# Patient Record
Sex: FEMALE | Race: BLACK OR AFRICAN AMERICAN | NOT HISPANIC OR LATINO | Employment: OTHER | URBAN - METROPOLITAN AREA
[De-identification: names, ages, dates, MRNs, and addresses within clinical notes are randomized per-mention and may not be internally consistent; named-entity substitution may affect disease eponyms.]

---

## 2017-01-04 ENCOUNTER — OFFICE VISIT (OUTPATIENT)
Dept: NEUROLOGY | Facility: CLINIC | Age: 77
End: 2017-01-04
Payer: MEDICARE

## 2017-01-04 VITALS
WEIGHT: 187 LBS | SYSTOLIC BLOOD PRESSURE: 150 MMHG | DIASTOLIC BLOOD PRESSURE: 69 MMHG | BODY MASS INDEX: 31.16 KG/M2 | RESPIRATION RATE: 20 BRPM | HEART RATE: 65 BPM | HEIGHT: 65 IN

## 2017-01-04 DIAGNOSIS — R41.3 MEMORY LOSS: Primary | ICD-10-CM

## 2017-01-04 PROCEDURE — 3078F DIAST BP <80 MM HG: CPT | Mod: S$GLB,,,

## 2017-01-04 PROCEDURE — 99499 UNLISTED E&M SERVICE: CPT | Mod: S$GLB,,, | Performed by: NURSE PRACTITIONER

## 2017-01-04 PROCEDURE — 1125F AMNT PAIN NOTED PAIN PRSNT: CPT | Mod: S$GLB,,,

## 2017-01-04 PROCEDURE — 3077F SYST BP >= 140 MM HG: CPT | Mod: S$GLB,,,

## 2017-01-04 PROCEDURE — 1159F MED LIST DOCD IN RCRD: CPT | Mod: S$GLB,,,

## 2017-01-04 PROCEDURE — 1160F RVW MEDS BY RX/DR IN RCRD: CPT | Mod: S$GLB,,,

## 2017-01-04 PROCEDURE — 99215 OFFICE O/P EST HI 40 MIN: CPT | Mod: S$GLB,,,

## 2017-01-04 PROCEDURE — 99999 PR PBB SHADOW E&M-EST. PATIENT-LVL IV: CPT | Mod: PBBFAC,,,

## 2017-01-04 PROCEDURE — 1157F ADVNC CARE PLAN IN RCRD: CPT | Mod: S$GLB,,,

## 2017-01-04 NOTE — Clinical Note
Any idea when this lady's NP testing will be done? Just want to have an idea for scheduling follow up with me.  Thanks!

## 2017-01-04 NOTE — PROGRESS NOTES
"Name: Jerica Rios  MRN: [id number]   CSN: [id number]      Date: 01/04/2017    Referring physician:  No referring provider defined for this encounter.    Chief Complaint / Interval History: Memory Loss      History of Present Illness (HPI):    She feels things are "fair. Her son, who accompanies her today still notices some issues with short term memory. He describes an event after [place] where she could not find her pill box. He found it in the pressure cooker (was not on-had been cleaned and was ready to be put away).  They talked about having a family gathering with the family coming over for the holdiays and she would ask why they were coming over. He would remind her. He is havng to repeat info several times. They have watched Lisa Santos cooking shows for years. The other day, she heard her name and asked who she is. She is independent with ADLs.       Date: 11/03/2016     Referring physician:  EVE Fermin  [address] [place] [address]     Chief Complaint / Interval History: Consult        History of Present Illness (HPI):     75 yo RH female who presents for evaluation of memory and is accompanied by her son, Rosalio. He says he initiated this visit. He has lived with her the past 7 years. He moved in when her  / his father became ill. She offers that she is aware of memory issues. He offers that she has always had a great deal of anxeity and takes clonazepam on occasion. He says sometimes, her emoitons get so unraveled. Over the past 5-6 years, he has noted some short term memory issues. She will forget things. He says it used to be she would forget something that happened two weeks ago but could recall with prompting..      She is able to feed, bathe, and dress self. In the past couple of weeks, she has had an aide while her son is at work.      Within past few weeks, he feels there has been an accelerator to her memory issues. Now she cannot recall a morning event that same " "evening. A few weeks ago, she described an earache and felt bad enough to go to ED. An infection was discovered and she was given IV antibiotics. At that point, she was also discovered to have hyponatremia and was admitted overnight for observation. The next day, she was sent home with home health. She followed up with ENT and infection was gone. Hearing test indicated slt hearing loss in B ears.     He offers that she is has always been paranoid to talk on the phone, even to family members. She always felt by doing this, some harm may come her way. Recently, medical Professionals have been calling to schedule times to come to home for therapies or assistance. She will agree. When they arrive, she felix s not know why they are there and is scared to open the door.      She is forgetting names of famiily members. She does get lost inside her home. She has not driven in years. She cooks sometimes and says she has no trouble. When asked if she ever burns food, son says a couple of days ago, she got sidetracked and burned vegetables in oven. She realized this when she smelled burning. Son has managed finances since his father .      He says home nurse has done labs a couple of times over the past couple of weeks. Everything has been fine except "stable anemia."    Diagnoses:          Memory Loss- MOCA 13+1=  CT head from - - ? Enlarged ventricles  Describes some bladder leakage. Had falls earlier this year, not recent. Gait does not look consistent with NPH.     Medical Decision Making:     Complete reversible memory labs.  Repeat CT head.   Refer to NP evaluation. This may be cancelled if CT head looks c/w NPH.   Follow up 2-3 weeks with me or Archana Calhoun NP after CT head- prefer [employer] if opening available.   I have asked her to not cook until we have more answers. She and son agreeable.        ROS:  Urinary issues ()?Yes- some bladder leakage if waits too long- noted past 3-4 mos- not " worsening  Orthostasis (Cardiovascular)?No  Falls (Musculoskeletal)?No  Cognitive impairment (Neurologic)?Yes  Psychoses (Psychiatric)?No      Past Medical History: The patient  has a past medical history of Allergy; Arthritis; Carotid artery disease; Coronary artery disease; Diabetes mellitus; Diabetes mellitus, type 2; Fall at home; GERD (gastroesophageal reflux disease); Headache(784.0); Hyperlipidemia; Hypertension; Mental disorder; Obesity; Right rib fracture; Snoring; and Toe fracture, right.    Social History: The patient  reports that she has never smoked. She has never used smokeless tobacco. She reports that she does not drink alcohol or use illicit drugs.    Family History: Their family history includes Cancer in her mother; Diabetes in her mother; Heart disease in her brother, father, mother, and sister; Stroke in her brother. There is no history of Dementia.    Allergies: Amoxicillin-pot clavulanate; Meperidine; Propoxyphene n-acetaminophen; and Sulfa (sulfonamide antibiotics)     Meds:   Current Outpatient Prescriptions on File Prior to Visit   Medication Sig Dispense Refill    baclofen (LIORESAL) 10 MG tablet START WITH 1/2 TABLET EVERY EVENING X 1 WEEK THEN 1 INCREASE TO 1 TABLET EVERY EVENING 90 tablet 0    blood sugar diagnostic (TRUETEST TEST STRIPS) Strp 1 strip by Misc.(Non-Drug; Combo Route) route once daily. 100 strip 3    clonazePAM (KLONOPIN) 0.5 MG tablet TAKE 1 TABLET BY MOUTH 1 TO 2 TIMES A DAY AS NEEDED 30 tablet 0    clopidogrel (PLAVIX) 75 mg tablet TAKE 1 TABLET BY MOUTH EVERY DAY -MUST FOLLOW UP WITH CARDIOLOGIST 30 tablet 11    esomeprazole (NEXIUM) 20 MG capsule Take 20 mg by mouth once daily. Patient takes OTC nexium      guaifenesin (MUCINEX) 600 mg 12 hr tablet Take 1 tablet (600 mg total) by mouth 2 (two) times daily. As needed for congestion. May start with 1/2 tablet also. Drink plenty of liquids with this medication      hydrocodone-acetaminophen 5-325mg (NORCO) 5-325  mg per tablet Take 1 tablet by mouth every 8 (eight) hours as needed for Pain.      ipratropium (ATROVENT) 0.03 % nasal spray       irbesartan (AVAPRO) 300 MG tablet TAKE 1 TABLET BY MOUTH EVERY EVENING. 90 tablet 2    KLOR-CON SPRINKLE 10 mEq CpSR TAKE ONE CAPSULE BY MOUTH EVERY DAY 30 capsule 4    lancets & blood glucose strips Cmpk 1 each by Misc.(Non-Drug; Combo Route) route once daily. 30 each 0    lancets 33 gauge Misc 1 lancet by Misc.(Non-Drug; Combo Route) route once daily. 100 each 3    levothyroxine (SYNTHROID) 50 MCG tablet TAKE 1 TABLET BY MOUTH EVERY MORNING ON AN EMPTY STOMACH 90 tablet 3    magnesium oxide (MAG-OXIDE) 400 mg tablet Take 1 tablet (400 mg total) by mouth once daily. Every day 90 tablet 3    metformin (GLUCOPHAGE) 500 MG tablet TAKE 1 TABLET (500 MG TOTAL) BY MOUTH 2 (TWO) TIMES DAILY WITH MEALS. 180 tablet 3    metoprolol tartrate (LOPRESSOR) 25 MG tablet Take 1 tablet (25 mg total) by mouth 2 (two) times daily. 180 tablet 3    mometasone (NASONEX) 50 mcg/actuation nasal spray 2 sprays by Nasal route once daily. 17 g 3    MULTIVITAMIN ORAL Take 1 Package by mouth once daily. Pt takes Diabetic Vitamin Pack      nitroGLYCERIN (NITROSTAT) 0.4 MG SL tablet Place 1 tablet (0.4 mg total) under the tongue every 5 (five) minutes as needed for Chest pain. As directed  tablet 3    potassium chloride (MICRO-K) 10 MEQ CpSR TAKE ONE CAPSULE BY MOUTH EVERY DAY 30 capsule 4    sertraline (ZOLOFT) 50 MG tablet TAKE 1 TABLET BY MOUTH EVERY DAY 30 tablet 0    vitamin D 1000 units Tab Take 185 mg by mouth once daily.      amlodipine (NORVASC) 10 MG tablet Take 1 tablet (10 mg total) by mouth once daily. 90 tablet 3    atorvastatin (LIPITOR) 40 MG tablet Take 1 tablet (40 mg total) by mouth once daily. 90 tablet 3    [DISCONTINUED] baclofen (LIORESAL) 10 MG tablet START WITH 1/2 TABLET EVERY EVENING X 1 WEEK THEN INCREASE TO 1 TABLET EVERY EVENING 30 tablet 0    [DISCONTINUED]  "ciprofloxacin-dexamethasone 0.3-0.1% (CIPRODEX) 0.3-0.1 % DrpS 4 drops 2 (two) times daily. Left ear      [DISCONTINUED] ranitidine (ZANTAC) 150 MG tablet Take 1 tablet (150 mg total) by mouth 2 (two) times daily. 60 tablet 11     No current facility-administered medications on file prior to visit.        Exam:  Visit Vitals    BP (!) 150/69    Pulse 65    Resp 20    Ht 5' 5" (1.651 m)    Wt 84.8 kg (187 lb)  Comment: reports    BMI 31.12 kg/m2       Constitutional  Well-developed, well-nourished, appears stated age   Cardiovascular  Radial pulses 2+ and symmetric, no LE edema bilaterally   Neurological    * Mental status  MOCA = deferred today     - Orientation  Oriented to person, place, time (BDay was yesterday) and situation     - Memory   Not tested today     - Attention/concentration  Attentive, vigilant during exam     - Language  Fluent- appropriate in conversation   * Cranial nerves       - CN II  PERRL, visual fields full to confrontation     - CN III, IV, VI  Extraocular movements full, normal pursuits and saccades     - CN V  Sensation V1 - V3 intact     - CN VII  Face strong and symmetric bilaterally     - CN VIII  Hearing intact bilaterally     - CN IX, X  Palate raises midline and symmetric     - CN XI  SCM and trapezius 5/5 bilaterally     - CN XII  Tongue midline   * Gait  Pushes self to stand. Ambulates with assist of rollator.      Laboratory/Radiological:  - Results:  Lab Visit on 11/03/2016   Component Date Value Ref Range Status    Sodium 11/03/2016 135* 136 - 145 mmol/L Final    Potassium 11/03/2016 4.4  3.5 - 5.1 mmol/L Final    Chloride 11/03/2016 99  95 - 110 mmol/L Final    CO2 11/03/2016 24  23 - 29 mmol/L Final    Glucose 11/03/2016 92  70 - 110 mg/dL Final    BUN, Bld 11/03/2016 10  8 - 23 mg/dL Final    Creatinine 11/03/2016 1.0  0.5 - 1.4 mg/dL Final    Calcium 11/03/2016 10.1  8.7 - 10.5 mg/dL Final    Total Protein 11/03/2016 7.4  6.0 - 8.4 g/dL Final    Albumin " 11/03/2016 4.4  3.5 - 5.2 g/dL Final    Total Bilirubin 11/03/2016 0.6  0.1 - 1.0 mg/dL Final    Alkaline Phosphatase 11/03/2016 120  55 - 135 U/L Final    AST 11/03/2016 15  10 - 40 U/L Final    ALT 11/03/2016 15  10 - 44 U/L Final    Anion Gap 11/03/2016 12  8 - 16 mmol/L Final    eGFR if African American 11/03/2016 >60.0  >60 mL/min/1.73 m^2 Final    eGFR if non African American 11/03/2016 54.9* >60 mL/min/1.73 m^2 Final    RPR 11/03/2016 Non-reactive  Non-reactive Final       - Independent review of images: CT head               Impression    Mild brain atrophy with deep white matter ischemic changes otherwise negative head CT.      Electronically signed by: Sandeep Freeman MD  Date: 11/09/16           Diagnoses:            Memory Loss- suspect dementia    Medical Decision Making:    Reviewed CT head and images with them. Mild enlargement of ventricles. Doubtful NPH. Does have some bladder leakage if waits too long. No recent falls. Gait not c/w NPH. Discussed this differential with them, as well as the testing (high volume LP) and treatment of shunting. At this point, they are not interested in pursuing testing as no interested in treatment if it ended up positive.   Will proceed with NP testing.   Follow up 2-3 weeks after for results (will try to get her scheduled for follow up on [place]. They understand the NP eval is on [facility].   Son has many questions about dementia and AD as well as medication options, goals of treatment, and supplements (i.e. Gingko).       I spent 45 minutes face-to-face with the patient and son with >90% of the time spent with counseling and education regarding:  - results of data, diagnosis, and recommendations stated above  - the prognosis of memory loss, dementia  - rationale of plan  - importance of diet and exercise    Lesly Humphreys, JONO, NP-C  Division of Movement and Memory Disorders  [facility]  [phone]

## 2017-01-16 ENCOUNTER — TELEPHONE (OUTPATIENT)
Dept: NEUROLOGY | Facility: CLINIC | Age: 77
End: 2017-01-16

## 2017-01-16 NOTE — TELEPHONE ENCOUNTER
----- Message from Chrissy Felix MA sent at 1/12/2017  8:40 AM CST -----  Contact: Matthew denney son [phone]   2wk follow up after NP eval with Dr. Torres on the [facility] with magdaleno if possible  ----- Message -----     From: Mariama Kirkpatrick MA     Sent: 1/5/2017   1:23 PM       To: Chrissy Felix MA    Spoke to patient's son Matthew, scheduled on 4/12th testing. They need a Wednesday and on the [facility] if possible for the 2 weeks follow up appt with Dr. Humphreys.

## 2017-01-16 NOTE — TELEPHONE ENCOUNTER
Returned call to pt's son and informed scheduled for May is not open, but will booked pt for appointment in May and he would like it around 10am. Will send message in "[employer] [employer] per request.

## 2017-01-16 NOTE — TELEPHONE ENCOUNTER
----- Message from Areli Masters sent at 1/16/2017 10:08 AM CST -----  Contact: Rosalio (son) [phone]  Rosalio is calling to speak with Chrissy to schedule appt to see jc malik call

## 2017-01-20 ENCOUNTER — TELEPHONE (OUTPATIENT)
Dept: NEUROLOGY | Facility: CLINIC | Age: 77
End: 2017-01-20

## 2017-01-20 NOTE — TELEPHONE ENCOUNTER
----- Message from Mariama Kirkpatrick MA sent at 1/5/2017  1:23 PM CST -----  Contact: Matthew pts son [phone]  Spoke to patient's son Matthew, scheduled on 4/12th testing. They need a Wednesday and on the [facility] if possible for the 2 weeks follow up appt with Dr. Humphreys.

## 2017-01-20 NOTE — TELEPHONE ENCOUNTER
Called patient and informed him about [facility] is still closed and that I will stay in contact with him regarding his appointment

## 2017-02-17 RX ORDER — BACLOFEN 10 MG/1
TABLET ORAL
Qty: 90 TABLET | Refills: 0 | Status: SHIPPED | OUTPATIENT
Start: 2017-02-17 | End: 2017-05-20 | Stop reason: SDUPTHER

## 2017-02-28 RX ORDER — CLOPIDOGREL BISULFATE 75 MG/1
TABLET ORAL
Qty: 30 TABLET | Refills: 11 | Status: SHIPPED | OUTPATIENT
Start: 2017-02-28 | End: 2018-01-27 | Stop reason: SDUPTHER

## 2017-03-08 ENCOUNTER — OFFICE VISIT (OUTPATIENT)
Dept: PAIN MEDICINE | Facility: CLINIC | Age: 77
End: 2017-03-08
Payer: MEDICARE

## 2017-03-08 VITALS
SYSTOLIC BLOOD PRESSURE: 120 MMHG | HEIGHT: 65 IN | DIASTOLIC BLOOD PRESSURE: 75 MMHG | RESPIRATION RATE: 18 BRPM | HEART RATE: 65 BPM | BODY MASS INDEX: 31.7 KG/M2 | WEIGHT: 190.25 LBS

## 2017-03-08 DIAGNOSIS — M51.36 DDD (DEGENERATIVE DISC DISEASE), LUMBAR: Primary | ICD-10-CM

## 2017-03-08 DIAGNOSIS — G89.29 CHRONIC RIGHT SHOULDER PAIN: ICD-10-CM

## 2017-03-08 DIAGNOSIS — M25.511 CHRONIC RIGHT SHOULDER PAIN: ICD-10-CM

## 2017-03-08 DIAGNOSIS — M47.817 LUMBOSACRAL SPONDYLOSIS WITHOUT MYELOPATHY: ICD-10-CM

## 2017-03-08 PROCEDURE — 99999 PR PBB SHADOW E&M-EST. PATIENT-LVL III: CPT | Mod: PBBFAC,,, | Performed by: ANESTHESIOLOGY

## 2017-03-08 PROCEDURE — 3074F SYST BP LT 130 MM HG: CPT | Mod: S$GLB,,, | Performed by: ANESTHESIOLOGY

## 2017-03-08 PROCEDURE — 1159F MED LIST DOCD IN RCRD: CPT | Mod: S$GLB,,, | Performed by: ANESTHESIOLOGY

## 2017-03-08 PROCEDURE — 20610 DRAIN/INJ JOINT/BURSA W/O US: CPT | Mod: S$GLB,,, | Performed by: ANESTHESIOLOGY

## 2017-03-08 PROCEDURE — 1125F AMNT PAIN NOTED PAIN PRSNT: CPT | Mod: S$GLB,,, | Performed by: ANESTHESIOLOGY

## 2017-03-08 PROCEDURE — 99214 OFFICE O/P EST MOD 30 MIN: CPT | Mod: 25,S$GLB,, | Performed by: ANESTHESIOLOGY

## 2017-03-08 PROCEDURE — 1157F ADVNC CARE PLAN IN RCRD: CPT | Mod: S$GLB,,, | Performed by: ANESTHESIOLOGY

## 2017-03-08 PROCEDURE — 3078F DIAST BP <80 MM HG: CPT | Mod: S$GLB,,, | Performed by: ANESTHESIOLOGY

## 2017-03-08 PROCEDURE — 1160F RVW MEDS BY RX/DR IN RCRD: CPT | Mod: S$GLB,,, | Performed by: ANESTHESIOLOGY

## 2017-03-08 RX ORDER — METHYLPREDNISOLONE ACETATE 40 MG/ML
40 INJECTION, SUSPENSION INTRA-ARTICULAR; INTRALESIONAL; INTRAMUSCULAR; SOFT TISSUE
Status: DISCONTINUED | OUTPATIENT
Start: 2017-03-08 | End: 2017-03-08 | Stop reason: HOSPADM

## 2017-03-08 RX ORDER — HYDROCODONE BITARTRATE AND ACETAMINOPHEN 5; 325 MG/1; MG/1
1 TABLET ORAL EVERY 8 HOURS PRN
Qty: 90 TABLET | Refills: 0 | Status: SHIPPED | OUTPATIENT
Start: 2017-03-08 | End: 2017-07-12 | Stop reason: SDUPTHER

## 2017-03-08 RX ADMIN — METHYLPREDNISOLONE ACETATE 40 MG: 40 INJECTION, SUSPENSION INTRA-ARTICULAR; INTRALESIONAL; INTRAMUSCULAR; SOFT TISSUE at 10:03

## 2017-03-08 NOTE — PROGRESS NOTES
Referring Physician: No ref. provider found    PCP: Krissy Ramírez MD      CC:right shoulder pain    Interval history: Patient is known patient to our clinic.  She is known patient with lumbar DDD as well as cervical DDD.  She presents today with worsening right shoulder pain.  She had a fall about 8 months ago and found to have a right shoulder rotator cuff tear.  She has tried physical therapy with minimal benefit.  She does have constant aching pain in her right shoulder proximal right arm.  She denies any weakness.  No bowel bladder changes.   She takes norco 1/2 to 1 tab about once a day with moderate benefit.  She also take baclofen daily as needed with mild benefits.  No side effects reported.  She rates her pain 8/10 today.   Prior HPI:    Patient is 75-year-old female referred to us by her PCP for lower back pain and neck pain.  Lower back pain is more bothersome.  Pain has been present for over 10 years.  No traumatic incident.  She has constant aching, throbbing and sharp pain located in her posterior lower back.  Pain radiates to her buttocks and down her right leg at times.  Pain worsens with standing, bending, walking, lifting and getting out of chair.  She states having lumbar epidural steroid injections in the past with moderate benefit but none recently.  She denies any weakness.  No bowel bladder changes.  Neck pain is a constant aching posterior neck pain and pain radiates to her shoulders.  She denies any radiating arm pain.  No incoordination.  Neck pain is currently tolerable.  She rates her pain 5/10 today, 7/10 usually, 10/10 at worst, 4/10    Pain interventions history: s/p bilateral L4-5T TFESI on 12/14/15 and reports about 40% relief of her lower back pain    ROS:  CONSTITUTIONAL: No fevers, chills, night sweats, wt. loss, appetite changes  SKIN: no rashes or itching  ENT: No headaches, head trauma, vision changes, or eye pain  LYMPH NODES: None noticed   CV: No chest pain, palpitations.  "  RESP: No shortness of breath, dyspnea on exertion, cough, wheezing, or hemoptysis  GI: No nausea, emesis, diarrhea, constipation, melena, hematochezia, pain.    : No dysuria, hematuria, urgency, or frequency   HEME: No easy bruising, bleeding problems  PSYCHIATRIC: No psychosis, hallucinations.  + Anxiety and depression  NEURO: No seizures, memory loss, dizziness or difficulty sleeping  MSK: + History of present illness      Past Medical History:   Diagnosis Date    Allergy     Arthritis     Carotid artery disease     Coronary artery disease     Diabetes mellitus     Diabetes mellitus, type 2     Fall at home     GERD (gastroesophageal reflux disease)     Headache(784.0)     Hyperlipidemia     Hypertension     Mental disorder     Obesity     Right rib fracture     Snoring     Toe fracture, right      Past Surgical History:   Procedure Laterality Date    CHOLECYSTECTOMY      CORONARY STENT PLACEMENT      x 5    CYST REMOVAL      sebaceous cyst from back    EYE SURGERY      OVARIAN CYST REMOVAL      SINUS SURGERY      TONSILLECTOMY       Family History   Problem Relation Age of Onset    Heart disease Mother     Diabetes Mother     Cancer Mother      breast cancer    Heart disease Father     Heart disease Sister     Stroke Brother     Heart disease Brother     Dementia Neg Hx      Social History     Social History    Marital status:      Spouse name: N/A    Number of children: N/A    Years of education: N/A     Social History Main Topics    Smoking status: Never Smoker    Smokeless tobacco: Never Used    Alcohol use No    Drug use: No    Sexual activity: Not Asked     Other Topics Concern    None     Social History Narrative         Medications/Allergies: See med card    Vitals:    03/08/17 0953   BP: 120/75   Pulse: 65   Resp: 18   Weight: 86.3 kg (190 lb 4.1 oz)   Height: 5' 5" (1.651 m)   PainSc:   8   PainLoc: Back         Physical exam:    GENERAL: A and O x3, the " patient appears well groomed and is in no acute distress.  Skin: No rashes or obvious lesions  HEENT: normocephalic, atraumatic  CARDIOVASCULAR:  Palpable peripheral pulses  LUNGS: easy work of breathing  ABDOMEN: soft, nontender   UPPER EXTREMITIES: Normal alignment, normal range of motion, no atrophy, no skin changes,  hair growth and nail growth normal and equal bilaterally. No swelling, no tenderness.    LOWER EXTREMITIES:  Normal alignment, normal range of motion, no atrophy, no skin changes,  hair growth and nail growth normal and equal bilaterally. No swelling, no tenderness.  CERVICAL SPINE:  Cervical spine: ROM is full in flexion, extension and lateral rotation with moderate increased pain.  Spurling's maneuver causes neck pain to bilateral side.  Myofascial exam: No Tenderness to palpation across cervical paraspinous region bilaterally.    LUMBAR SPINE  Lumbar spine: ROM is limited with flexion extension and oblique extension with increased pain.    Hill's test causes no increased pain on either side.    Supine straight leg raise is negative bilaterally.    Internal and external rotation of the hip causes no increased pain on either side.  Myofascial exam: No tenderness to palpation across lumbar paraspinous muscles.      MENTAL STATUS: normal orientation, speech, language, and fund of knowledge for social situation.  Emotional state appropriate.    CRANIAL NERVES:  II:  PERRL bilaterally,   III,IV,VI: EOMI.    V:  Facial sensation equal bilaterally  VII:  Facial motor function normal.  VIII:  Hearing equal to finger rub bilaterally  IX/X: Gag normal, palate symmetric  XI:  Shoulder shrug equal, head turn equal  XII:  Tongue midline without fasciculations      MOTOR: Tone and bulk: normal bilateral upper and lower Strength: normal   Delt Bi Tri WE WF     R 5 5 5 5 5 5   L 5 5 5 5 5 5     IP ADD ABD Quad TA Gas HAM  R 5 5 5 5 5 5 5  L 5 5 5 5 5 5 5    SENSATION: Light touch and pinprick intact  bilaterally  REFLEXES: normal, symmetric, nonbrisk.  Toes down, no clonus. No hoffmans.  GAIT: normal rise, base, steps, and arm swing.        Imaging:  Xray L-spine 3/2015  AP, lateral, bilateral oblique and spot images of the lumbar spine are provided. The there is grade 1 spondylolisthesis at L4-5. At L4-5 there is displaced narrowing and vacuum phenomenon consistent with chronic degenerative disk disease. The   vertebral bodies are intact without evidence of fracture or compression. Spondylolysis is not seen. The rest of the disk bases are well maintained    Xray C-spine 10/2015  An anterior listhesis is noted of 4 millimeters of the C4 on C5 vertebral body. Intervertebral disk height loss is noted at the C5-C6 and C6-C7 levels.     The C7-T1 level is not well-visualized.     Osseous nerve foraminal narrowing is noted at the C3-C4 C4-C5 C5-C6 levels on the left and right.       MRI right UE 7/2016  1.  Degenerative cyst formation within the greater tuberosity corresponding to the radiographic finding.  2.  Rotator cuff tendinosis and small insertional tear of the anterior supraspinatus tendon.    Assessment:  Patient presents with  1. DDD (degenerative disc disease), lumbar    2. Chronic right shoulder pain    3. Lumbosacral spondylosis without myelopathy          Plan:  - I have stressed the importance of physical activity and exercise to improve overall health  - Perform a right shoulder intra-articular injection today.   - She can take Norco 5 mg every 8 hours as needed for pain. Hold for any unwanted side effects.  Refill provided  - Follow upin 3 months

## 2017-03-08 NOTE — PROCEDURES
Large Joint Aspiration/Injection  Date/Time: 3/8/2017 10:58 AM  Performed by: KRISTIAN MENDOZA  Authorized by: KRISTIAN MENDOZA     Consent Done?:  Yes (Written)  Indications:  Pain  Procedure site marked: Yes    Timeout: Prior to procedure the correct patient, procedure, and site was verified      Location:  Shoulder  Site:  R glenohumeral  Prep: Patient was prepped and draped in usual sterile fashion    Needle size:  27 G  Approach:  Posterior  Medications:  40 mg methylPREDNISolone acetate 40 mg/mL  Patient tolerance:  Patient tolerated the procedure well with no immediate complications

## 2017-03-08 NOTE — MR AVS SNAPSHOT
[facility]  [address] [place] [address] Dr [address]  [place] [address]  Phone: [phone]                  Jerica Rios   3/8/2017 9:40 AM   Office Visit    Description:  Female : 1940   Provider:  Dayne Cole MD   Department:  [facility]           Diagnoses this Visit        Comments    DDD (degenerative disc disease), lumbar    -  Primary     Lumbosacral spondylosis without myelopathy                To Do List           Future Appointments        Provider Department Dept Phone    5/3/2017 10:15 AM [facility] - Neurology [phone]    2017 10:00 AM MD [facility] [phone]      Goals (5 Years of Data)     None       These Medications        Disp Refills Start End    hydrocodone-acetaminophen 5-325mg (NORCO) 5-325 mg per tablet 90 tablet 0 3/8/2017     Take 1 tablet by mouth every 8 (eight) hours as needed for Pain. - Oral    Pharmacy: [facility]/pharmacy #[id number] - [place] - [address].  #: [phone]         [facility]     [facility] On Call Nurse Care Line -  Assistance  Registered nurses in the [facility] On Call Center provide clinical advisement, health education, appointment booking, and other advisory services.  Call for this free service at [phone].             Medications           Message regarding Medications     Verify the changes and/or additions to your medication regime listed below are the same as discussed with your clinician today.  If any of these changes or additions are incorrect, please notify your healthcare provider.             Verify that the below list of medications is an accurate representation of the medications you are currently taking.  If none reported, the list may be blank. If incorrect, please contact your healthcare provider. Carry this list with you in case of emergency.           Current Medications     amlodipine (NORVASC) 10 MG tablet Take 1 tablet (10 mg  total) by mouth once daily.    atorvastatin (LIPITOR) 40 MG tablet Take 1 tablet (40 mg total) by mouth once daily.    baclofen (LIORESAL) 10 MG tablet START WITH 1/2 TABLET EVERY EVENING X 1 WEEK THEN 1 INCREASE TO 1 TABLET EVERY EVENING    blood sugar diagnostic (TRUETEST TEST STRIPS) Strp 1 strip by Misc.(Non-Drug; Combo Route) route once daily.    clonazePAM (KLONOPIN) 0.5 MG tablet TAKE 1 TABLET BY MOUTH 1 TO 2 TIMES A DAY AS NEEDED    clopidogrel (PLAVIX) 75 mg tablet TAKE 1 TABLET BY MOUTH EVERY DAY -MUST FOLLOW UP WITH CARDIOLOGIST    esomeprazole (NEXIUM) 20 MG capsule Take 20 mg by mouth once daily. Patient takes OTC nexium    guaifenesin (MUCINEX) 600 mg 12 hr tablet Take 1 tablet (600 mg total) by mouth 2 (two) times daily. As needed for congestion. May start with 1/2 tablet also. Drink plenty of liquids with this medication    hydrocodone-acetaminophen 5-325mg (NORCO) 5-325 mg per tablet Take 1 tablet by mouth every 8 (eight) hours as needed for Pain.    ipratropium (ATROVENT) 0.03 % nasal spray     irbesartan (AVAPRO) 300 MG tablet TAKE 1 TABLET BY MOUTH EVERY EVENING.    KLOR-CON SPRINKLE 10 mEq CpSR TAKE ONE CAPSULE BY MOUTH EVERY DAY    lancets & blood glucose strips Cmpk 1 each by Misc.(Non-Drug; Combo Route) route once daily.    lancets 33 gauge Misc 1 lancet by Misc.(Non-Drug; Combo Route) route once daily.    levothyroxine (SYNTHROID) 50 MCG tablet TAKE 1 TABLET BY MOUTH EVERY MORNING ON AN EMPTY STOMACH    magnesium oxide (MAG-OXIDE) 400 mg tablet Take 1 tablet (400 mg total) by mouth once daily. Every day    metformin (GLUCOPHAGE) 500 MG tablet TAKE 1 TABLET (500 MG TOTAL) BY MOUTH 2 (TWO) TIMES DAILY WITH MEALS.    metoprolol tartrate (LOPRESSOR) 25 MG tablet Take 1 tablet (25 mg total) by mouth 2 (two) times daily.    mometasone (NASONEX) 50 mcg/actuation nasal spray 2 sprays by Nasal route once daily.    MULTIVITAMIN ORAL Take 1 Package by mouth once daily. Pt takes Diabetic Vitamin Pack     "nitroGLYCERIN (NITROSTAT) 0.4 MG SL tablet Place 1 tablet (0.4 mg total) under the tongue every 5 (five) minutes as needed for Chest pain. As directed PRN    potassium chloride (MICRO-K) 10 MEQ CpSR TAKE ONE CAPSULE BY MOUTH EVERY DAY    sertraline (ZOLOFT) 50 MG tablet TAKE 1 TABLET BY MOUTH EVERY DAY    vitamin D 1000 units Tab Take 185 mg by mouth once daily.           Clinical Reference Information           Your Vitals Were     BP Pulse Resp Height Weight BMI    120/75 65 18 5' 5" (1.651 m) 86.3 kg (190 lb 4.1 oz) 31.66 kg/m2      Blood Pressure          Most Recent Value    BP  120/75      Allergies as of 3/8/2017     Amoxicillin-pot Clavulanate    Meperidine    Propoxyphene N-acetaminophen    Sulfa (Sulfonamide Antibiotics)      Immunizations Administered on Date of Encounter - 3/8/2017     None      Language Assistance Services     ATTENTION: Language assistance services are available, free of charge. Please call [phone].      ATENCIÓN: Si habla bill, tiene a ferguson disposición servicios gratuitos de asistencia lingüística. Llame al [phone].     JANKI Ý: N?u b?n nói Ti?ng Vi?t, có các d?ch v? h? tr? ngôn ng? mi?n phí dành cho b?n. G?i s? [phone].         [facility] complies with applicable Federal civil rights laws and does not discriminate on the basis of race, color, national origin, age, disability, or sex.        "

## 2017-03-09 DIAGNOSIS — E11.9 TYPE 2 DIABETES MELLITUS WITHOUT COMPLICATION: ICD-10-CM

## 2017-03-20 RX ORDER — IRBESARTAN 300 MG/1
TABLET ORAL
Qty: 90 TABLET | Refills: 2 | Status: SHIPPED | OUTPATIENT
Start: 2017-03-20 | End: 2017-12-15 | Stop reason: SDUPTHER

## 2017-03-26 RX ORDER — SERTRALINE HYDROCHLORIDE 50 MG/1
TABLET, FILM COATED ORAL
Qty: 30 TABLET | Refills: 6 | Status: SHIPPED | OUTPATIENT
Start: 2017-03-26 | End: 2017-10-09 | Stop reason: SDUPTHER

## 2017-03-27 RX ORDER — SERTRALINE HYDROCHLORIDE 50 MG/1
TABLET, FILM COATED ORAL
Qty: 30 TABLET | Refills: 6 | OUTPATIENT
Start: 2017-03-27

## 2017-03-29 ENCOUNTER — OFFICE VISIT (OUTPATIENT)
Dept: PAIN MEDICINE | Facility: CLINIC | Age: 77
End: 2017-03-29
Payer: MEDICARE

## 2017-03-29 ENCOUNTER — HOSPITAL ENCOUNTER (OUTPATIENT)
Dept: RADIOLOGY | Facility: CLINIC | Age: 77
Discharge: HOME | End: 2017-03-29
Attending: FAMILY MEDICINE
Payer: MEDICARE

## 2017-03-29 ENCOUNTER — DOCUMENTATION ONLY (OUTPATIENT)
Dept: FAMILY MEDICINE | Facility: CLINIC | Age: 77
End: 2017-03-29

## 2017-03-29 ENCOUNTER — OFFICE VISIT (OUTPATIENT)
Dept: FAMILY MEDICINE | Facility: CLINIC | Age: 77
End: 2017-03-29
Payer: MEDICARE

## 2017-03-29 VITALS
TEMPERATURE: 98 F | DIASTOLIC BLOOD PRESSURE: 64 MMHG | SYSTOLIC BLOOD PRESSURE: 119 MMHG | OXYGEN SATURATION: 98 % | HEIGHT: 65 IN | WEIGHT: 191.56 LBS | BODY MASS INDEX: 31.92 KG/M2 | HEART RATE: 64 BPM

## 2017-03-29 VITALS
WEIGHT: 188.06 LBS | BODY MASS INDEX: 31.33 KG/M2 | SYSTOLIC BLOOD PRESSURE: 126 MMHG | HEART RATE: 61 BPM | DIASTOLIC BLOOD PRESSURE: 72 MMHG | HEIGHT: 65 IN

## 2017-03-29 DIAGNOSIS — M17.11 PRIMARY OSTEOARTHRITIS OF RIGHT KNEE: ICD-10-CM

## 2017-03-29 DIAGNOSIS — E11.8 CONTROLLED TYPE 2 DIABETES MELLITUS WITH COMPLICATION, WITHOUT LONG-TERM CURRENT USE OF INSULIN: Primary | ICD-10-CM

## 2017-03-29 DIAGNOSIS — E78.5 HYPERLIPIDEMIA, UNSPECIFIED HYPERLIPIDEMIA TYPE: ICD-10-CM

## 2017-03-29 DIAGNOSIS — E03.9 HYPOTHYROIDISM, UNSPECIFIED TYPE: ICD-10-CM

## 2017-03-29 DIAGNOSIS — R53.83 FATIGUE, UNSPECIFIED TYPE: ICD-10-CM

## 2017-03-29 DIAGNOSIS — M47.819 FACET ARTHROPATHY: ICD-10-CM

## 2017-03-29 DIAGNOSIS — E66.9 OBESITY, CLASS I, BMI 30-34.9: ICD-10-CM

## 2017-03-29 DIAGNOSIS — M17.11 PRIMARY OSTEOARTHRITIS OF RIGHT KNEE: Primary | ICD-10-CM

## 2017-03-29 DIAGNOSIS — M51.36 DDD (DEGENERATIVE DISC DISEASE), LUMBAR: ICD-10-CM

## 2017-03-29 PROCEDURE — 3074F SYST BP LT 130 MM HG: CPT | Mod: S$GLB,,, | Performed by: PHYSICIAN ASSISTANT

## 2017-03-29 PROCEDURE — 3074F SYST BP LT 130 MM HG: CPT | Mod: S$GLB,,, | Performed by: ANESTHESIOLOGY

## 2017-03-29 PROCEDURE — 1159F MED LIST DOCD IN RCRD: CPT | Mod: S$GLB,,, | Performed by: PHYSICIAN ASSISTANT

## 2017-03-29 PROCEDURE — 73560 X-RAY EXAM OF KNEE 1 OR 2: CPT | Mod: 26,RT,S$GLB, | Performed by: RADIOLOGY

## 2017-03-29 PROCEDURE — 1157F ADVNC CARE PLAN IN RCRD: CPT | Mod: S$GLB,,, | Performed by: ANESTHESIOLOGY

## 2017-03-29 PROCEDURE — 1125F AMNT PAIN NOTED PAIN PRSNT: CPT | Mod: S$GLB,,, | Performed by: ANESTHESIOLOGY

## 2017-03-29 PROCEDURE — 1157F ADVNC CARE PLAN IN RCRD: CPT | Mod: S$GLB,,, | Performed by: PHYSICIAN ASSISTANT

## 2017-03-29 PROCEDURE — 99999 PR PBB SHADOW E&M-EST. PATIENT-LVL III: CPT | Mod: PBBFAC,,, | Performed by: ANESTHESIOLOGY

## 2017-03-29 PROCEDURE — 1160F RVW MEDS BY RX/DR IN RCRD: CPT | Mod: S$GLB,,, | Performed by: ANESTHESIOLOGY

## 2017-03-29 PROCEDURE — 3078F DIAST BP <80 MM HG: CPT | Mod: S$GLB,,, | Performed by: ANESTHESIOLOGY

## 2017-03-29 PROCEDURE — 1159F MED LIST DOCD IN RCRD: CPT | Mod: S$GLB,,, | Performed by: ANESTHESIOLOGY

## 2017-03-29 PROCEDURE — 99499 UNLISTED E&M SERVICE: CPT | Mod: S$GLB,,, | Performed by: PHYSICIAN ASSISTANT

## 2017-03-29 PROCEDURE — 1126F AMNT PAIN NOTED NONE PRSNT: CPT | Mod: S$GLB,,, | Performed by: PHYSICIAN ASSISTANT

## 2017-03-29 PROCEDURE — 1160F RVW MEDS BY RX/DR IN RCRD: CPT | Mod: S$GLB,,, | Performed by: PHYSICIAN ASSISTANT

## 2017-03-29 PROCEDURE — 20611 DRAIN/INJ JOINT/BURSA W/US: CPT | Mod: S$GLB,,, | Performed by: ANESTHESIOLOGY

## 2017-03-29 PROCEDURE — 3078F DIAST BP <80 MM HG: CPT | Mod: S$GLB,,, | Performed by: PHYSICIAN ASSISTANT

## 2017-03-29 PROCEDURE — 99214 OFFICE O/P EST MOD 30 MIN: CPT | Mod: S$GLB,,, | Performed by: PHYSICIAN ASSISTANT

## 2017-03-29 PROCEDURE — 99213 OFFICE O/P EST LOW 20 MIN: CPT | Mod: 25,S$GLB,, | Performed by: ANESTHESIOLOGY

## 2017-03-29 PROCEDURE — 99999 PR PBB SHADOW E&M-EST. PATIENT-LVL V: CPT | Mod: PBBFAC,,, | Performed by: PHYSICIAN ASSISTANT

## 2017-03-29 RX ORDER — HYDROCHLOROTHIAZIDE 25 MG/1
25 TABLET ORAL DAILY
Refills: 3 | COMMUNITY
Start: 2017-01-09 | End: 2017-11-08

## 2017-03-29 RX ADMIN — METHYLPREDNISOLONE ACETATE 40 MG: 40 INJECTION, SUSPENSION INTRA-ARTICULAR; INTRALESIONAL; INTRAMUSCULAR; SOFT TISSUE at 03:03

## 2017-03-29 NOTE — PROGRESS NOTES
Pre-Visit Chart Review  For Appointment Scheduled on 3/28/17    Health Maintenance Due   Topic Date Due    Influenza Vaccine  08/01/2016    Foot Exam  03/02/2017    Lipid Panel  03/09/2017    Eye Exam  03/30/2017    Hemoglobin A1c  04/04/2017

## 2017-03-29 NOTE — PROGRESS NOTES
"Subjective:       Patient ID: Jerica Rios is a 77 y.o. female.    Chief Complaint: Annual Exam    HPI   Patient is a 77 year old AA female presenting to the clinic for annual wellness exam. She is due for labs & will get these today. She did admit to eating a banana this morning, but I do not feel this will affect anything as she is getting an A1C. She reports recent issues with right knee pain causing her to fall due to it "giving out." She has had cortisone injection int he past which seemed to really help. She would like to see Dr. Cole for this. We will get him for follow-up. She sees Dr. Tan for eye exams. We will get this record today.    Review of Systems   Constitutional: Negative for activity change, appetite change, chills, diaphoresis, fatigue and fever.   HENT: Negative for congestion, postnasal drip and rhinorrhea.    Respiratory: Negative.  Negative for cough, shortness of breath and wheezing.    Cardiovascular: Negative.  Negative for chest pain.   Gastrointestinal: Negative for abdominal pain, blood in stool, constipation, diarrhea, nausea and vomiting.   Genitourinary: Negative for dysuria, frequency, hematuria and urgency.   Musculoskeletal: Positive for arthralgias (right knee pain).   Skin: Negative.  Negative for color change and rash.   Neurological: Negative for dizziness, syncope, light-headedness and headaches.   Psychiatric/Behavioral: Negative for agitation, behavioral problems and confusion.       Objective:      Physical Exam   Constitutional: She is oriented to person, place, and time. Vital signs are normal. She appears well-developed and well-nourished. No distress.   HENT:   Head: Normocephalic and atraumatic.   Right Ear: Hearing, tympanic membrane, external ear and ear canal normal.   Left Ear: Hearing, tympanic membrane, external ear and ear canal normal.   Nose: Nose normal.   Mouth/Throat: Oropharynx is clear and moist. Abnormal dentition.   Cardiovascular: " Normal rate, regular rhythm, S1 normal, S2 normal and normal heart sounds.  Exam reveals no gallop.    No murmur heard.  Pulses:       Radial pulses are 2+ on the right side, and 2+ on the left side.   <2sec cap refill fingers bilat     Pulmonary/Chest: Effort normal and breath sounds normal. No respiratory distress. She has no wheezes. She has no rhonchi.   Abdominal: Soft. Normal appearance and bowel sounds are normal. There is no tenderness. There is no rigidity, no guarding, no tenderness at McBurney's point and negative Jimenez's sign.   Musculoskeletal:        Right knee: She exhibits swelling (mild medial swelling). She exhibits normal range of motion, no effusion and no bony tenderness. Tenderness found. Medial joint line tenderness noted. No lateral joint line and no patellar tendon tenderness noted.   Lymphadenopathy:        Head (right side): No submental, no submandibular, no tonsillar, no preauricular, no posterior auricular and no occipital adenopathy present.        Head (left side): No submental, no submandibular, no tonsillar, no preauricular, no posterior auricular and no occipital adenopathy present.   Neurological: She is alert and oriented to person, place, and time. No cranial nerve deficit.   Skin: Skin is warm and dry. She is not diaphoretic.   Appropriate skin turgor   Psychiatric: She has a normal mood and affect. Her speech is normal and behavior is normal. Judgment and thought content normal. Cognition and memory are normal.       Assessment:       1. Controlled type 2 diabetes mellitus with complication, without long-term current use of insulin    2. Hyperlipidemia, unspecified hyperlipidemia type    3. Hypothyroidism, unspecified type    4. Fatigue, unspecified type    5. Primary osteoarthritis of right knee    6. Obesity, Class I, BMI 30-34.9        Plan:       Jerica was seen today for annual exam.    Diagnoses and all orders for this visit:    Controlled type 2 diabetes mellitus with  complication, without long-term current use of insulin  -     Comprehensive metabolic panel; Future  -     Hemoglobin A1c; Future  -     Microalbumin/creatinine urine ratio    Hyperlipidemia, unspecified hyperlipidemia type  -     Lipid panel; Future    Hypothyroidism, unspecified type  -     TSH; Future    Fatigue, unspecified type  -     CBC auto differential; Future  -     Vitamin D; Future    Primary osteoarthritis of right knee  -     X-Ray Knee 1 or 2 View Right; Future    Obesity, Class I, BMI 30-34.9  Patient readiness: acceptance and barriers:none    During the course of the visit the patient was educated and counseled about the following:     Diabetes:  Discussed general issues about diabetes pathophysiology and management.  Hypertension:   Medication: no change.  Obesity:   Regular aerobic exercise program discussed.    Goals: Diabetes: Maintain Hemoglobin A1C below 7, Hypertension: Reduce Blood Pressure and Obesity: Reduce calorie intake and BMI    Did patient meet goals/outcomes: No    The following self management tools provided: declined    Patient Instructions (the written plan) was given to the patient/family.     Time spent with patient: 30 minutes

## 2017-03-29 NOTE — MR AVS SNAPSHOT
[facility]  [address]  [place] [address]  Phone: [phone]  Fax: [phone]                  Jerica Rios   3/29/2017 10:40 AM   Office Visit    Description:  Female : 1940   Provider:  EVE Fermin   Department:  [facility]           Reason for Visit     Annual Exam           Diagnoses this Visit        Comments    Controlled type 2 diabetes mellitus with complication, without long-term current use of insulin    -  Primary     Hyperlipidemia, unspecified hyperlipidemia type         Hypothyroidism, unspecified type         Fatigue, unspecified type         Primary osteoarthritis of right knee         Obesity, Class I, BMI 30-34.9                To Do List           Future Appointments        Provider Department Dept Phone    3/29/2017 11:55 AM LAB, [facility] - Lab [phone]    3/29/2017 1:15 PM [facility] XR1 [facility]- X-Ray [phone]    3/29/2017 3:00 PM MD [facility] [phone]    5/3/2017 10:15 AM Lesly Humphreys NP [facility] [phone]    2017 10:00 AM MD [facility] [phone]      Goals (5 Years of Data)     None      Follow-Up and Disposition     Return for needs apt with Dr. Cole; knee pain.    Follow-up and Disposition History      [facility] On Call     [facility] On Call Nurse Care Line -  Assistance  Registered nurses in the [facility] On Call Center provide clinical advisement, health education, appointment booking, and other advisory services.  Call for this free service at [phone].             Medications           Message regarding Medications     Verify the changes and/or additions to your medication regime listed below are the same as discussed with your clinician today.  If any of these changes or additions are incorrect, please notify your healthcare provider.        STOP taking these medications     guaifenesin (MUCINEX) 600 mg  12 hr tablet Take 1 tablet (600 mg total) by mouth 2 (two) times daily. As needed for congestion. May start with 1/2 tablet also. Drink plenty of liquids with this medication           Verify that the below list of medications is an accurate representation of the medications you are currently taking.  If none reported, the list may be blank. If incorrect, please contact your healthcare provider. Carry this list with you in case of emergency.           Current Medications     amlodipine (NORVASC) 10 MG tablet Take 1 tablet (10 mg total) by mouth once daily.    atorvastatin (LIPITOR) 40 MG tablet Take 1 tablet (40 mg total) by mouth once daily.    baclofen (LIORESAL) 10 MG tablet START WITH 1/2 TABLET EVERY EVENING X 1 WEEK THEN 1 INCREASE TO 1 TABLET EVERY EVENING    blood sugar diagnostic (TRUETEST TEST STRIPS) Strp 1 strip by Misc.(Non-Drug; Combo Route) route once daily.    clonazePAM (KLONOPIN) 0.5 MG tablet TAKE 1 TABLET BY MOUTH 1 TO 2 TIMES A DAY AS NEEDED    clopidogrel (PLAVIX) 75 mg tablet TAKE 1 TABLET BY MOUTH EVERY DAY -MUST FOLLOW UP WITH CARDIOLOGIST    esomeprazole (NEXIUM) 20 MG capsule Take 20 mg by mouth once daily. Patient takes OTC nexium    hydrochlorothiazide (HYDRODIURIL) 25 MG tablet Take 25 mg by mouth once daily.    hydrocodone-acetaminophen 5-325mg (NORCO) 5-325 mg per tablet Take 1 tablet by mouth every 8 (eight) hours as needed for Pain.    ipratropium (ATROVENT) 0.03 % nasal spray     irbesartan (AVAPRO) 300 MG tablet TAKE 1 TABLET BY MOUTH EVERY EVENING.    KLOR-CON SPRINKLE 10 mEq CpSR TAKE ONE CAPSULE BY MOUTH EVERY DAY    lancets & blood glucose strips Cmpk 1 each by Misc.(Non-Drug; Combo Route) route once daily.    lancets 33 gauge Misc 1 lancet by Misc.(Non-Drug; Combo Route) route once daily.    levothyroxine (SYNTHROID) 50 MCG tablet TAKE 1 TABLET BY MOUTH EVERY MORNING ON AN EMPTY STOMACH    magnesium oxide (MAG-OXIDE) 400 mg tablet Take 1 tablet (400 mg total) by mouth once  "daily. Every day    metformin (GLUCOPHAGE) 500 MG tablet TAKE 1 TABLET (500 MG TOTAL) BY MOUTH 2 (TWO) TIMES DAILY WITH MEALS.    metoprolol tartrate (LOPRESSOR) 25 MG tablet Take 1 tablet (25 mg total) by mouth 2 (two) times daily.    mometasone (NASONEX) 50 mcg/actuation nasal spray 2 sprays by Nasal route once daily.    MULTIVITAMIN ORAL Take 1 Package by mouth once daily. Pt takes Diabetic Vitamin Pack    nitroGLYCERIN (NITROSTAT) 0.4 MG SL tablet Place 1 tablet (0.4 mg total) under the tongue every 5 (five) minutes as needed for Chest pain. As directed PRN    potassium chloride (MICRO-K) 10 MEQ CpSR TAKE ONE CAPSULE BY MOUTH EVERY DAY    sertraline (ZOLOFT) 50 MG tablet TAKE 1 TABLET BY MOUTH EVERY DAY    sertraline (ZOLOFT) 50 MG tablet TAKE 1 TABLET BY MOUTH EVERY DAY    vitamin D 1000 units Tab Take 185 mg by mouth once daily.           Clinical Reference Information           Your Vitals Were     BP Pulse Temp Height Weight SpO2    119/64 (BP Location: Right arm, Patient Position: Sitting, BP Method: Automatic) 64 98.4 °F (36.9 °C) (Oral) 5' 5" (1.651 m) 86.9 kg (191 lb 9.3 oz) 98%    BMI                31.88 kg/m2          Blood Pressure          Most Recent Value    BP  119/64      Allergies as of 3/29/2017     Amoxicillin-pot Clavulanate    Meperidine    Propoxyphene N-acetaminophen    Sulfa (Sulfonamide Antibiotics)      Immunizations Administered on Date of Encounter - 3/29/2017     None      Orders Placed During Today's Visit      Normal Orders This Visit    Microalbumin/creatinine urine ratio     Future Labs/Procedures Expected by Expires    CBC auto differential  3/29/2017 5/28/2018    Comprehensive metabolic panel  3/29/2017 5/28/2018    Hemoglobin A1c  3/29/2017 5/28/2018    Lipid panel  3/29/2017 5/28/2018    TSH  3/29/2017 5/28/2018    Vitamin D  3/29/2017 5/28/2018    X-Ray Knee 1 or 2 View Right  3/29/2017 3/29/2018      Instructions      4 Steps for Eating Healthier  Changing the way you eat " can improve your health. It can lower your cholesterol and blood pressure, and help you stay at a healthy weight. Your diet doesnt have to be bland and boring to be healthy. Just watch your calories and follow these steps:    1. Eat fewer unhealthy fats  · Choose more fish and lean meats instead of fatty cuts of meat.  · Skip butter and lard, and use less margarine.  · Pass on foods that have palm, coconut, or hydrogenated oils.  · Eat fewer high-fat dairy foods like cheese, ice cream, and whole milk.  · Get a heart-healthy cookbook and try some low-fat recipes.  2. Go light on salt  · Keep the saltshaker off the table.  · Limit high-salt ingredients, such as soy sauce, bouillon, and garlic salt.  · Instead of adding salt when cooking, season your food with herbs and flavorings. Try lemon, garlic, and onion.  · Limit convenience foods, such as boxed or canned foods and restaurant food.  · Read food labels and choose lower-sodium options.  3. Limit sugar  · Pause before you add sugars to pancakes, cereal, coffee, or tea. This includes white and brown table sugar, syrup, honey, and molasses. Cut your usual amount by half.  · Use non-sugar sweeteners. Stevia, aspartame, and sucralose can satisfy a sweet tooth without adding calories.  · Swap out sugar-filled soda and other drinks. Buy sugar-free or low-calorie beverages. Remember water is always the best choice.  · Read labels and choose foods with less added sugar. Keep in mind that dairy foods and foods with fruit will have some natural sugar.  · Cut the sugar in recipes by 1/3 to 1/2. Boost the flavor with extracts like almond, vanilla, or orange. Or add spices such as cinnamon or nutmeg.  4. Eat more fiber  · Eat fresh fruits and vegetables every day.  · Boost your diet with whole grains. Go for oats, whole-grain rice, and bran.  · Add beans and lentils to your meals.  · Drink more water to match your fiber increase. This is to help prevent constipation.  Date  Last Reviewed: 5/11/2015  © 2831-2211 [employer]. [address] [place] [address], [place] [address]. All rights reserved. This information is not intended as a substitute for professional medical care. Always follow your healthcare professional's instructions.             Language Assistance Services     ATTENTION: Language assistance services are available, free of charge. Please call [phone].      ATENCIÓN: Si habla español, tiene a ferguson disposición servicios gratuitos de asistencia lingüística. Llame al [phone].     [facility] Ý: N?u b?n nói Ti?ng Vi?t, có các d?ch v? h? tr? ngôn ng? mi?n phí dành cho b?n. G?i s? [phone].         [facility] complies with applicable Federal civil rights laws and does not discriminate on the basis of race, color, national origin, age, disability, or sex.

## 2017-03-29 NOTE — PROCEDURES
Large Joint Aspiration/Injection  Date/Time: 3/29/2017 3:57 PM  Performed by: KRISTIAN MENDOZA  Authorized by: KRISTIAN MENDOZA     Consent Done?:  Yes (Written)  Indications:  Pain  Procedure site marked: Yes    Timeout: Prior to procedure the correct patient, procedure, and site was verified      Location:  Knee  Site:  R knee  Prep: Patient was prepped and draped in usual sterile fashion    Ultrasonic Guidance for needle placement: Yes  Images are saved and documented.  Needle size:  27 G  Medications:  40 mg methylPREDNISolone acetate 40 mg/mL

## 2017-03-29 NOTE — PATIENT INSTRUCTIONS
4 Steps for Eating Healthier  Changing the way you eat can improve your health. It can lower your cholesterol and blood pressure, and help you stay at a healthy weight. Your diet doesnt have to be bland and boring to be healthy. Just watch your calories and follow these steps:    1. Eat fewer unhealthy fats  · Choose more fish and lean meats instead of fatty cuts of meat.  · Skip butter and lard, and use less margarine.  · Pass on foods that have palm, coconut, or hydrogenated oils.  · Eat fewer high-fat dairy foods like cheese, ice cream, and whole milk.  · Get a heart-healthy cookbook and try some low-fat recipes.  2. Go light on salt  · Keep the saltshaker off the table.  · Limit high-salt ingredients, such as soy sauce, bouillon, and garlic salt.  · Instead of adding salt when cooking, season your food with herbs and flavorings. Try lemon, garlic, and onion.  · Limit convenience foods, such as boxed or canned foods and restaurant food.  · Read food labels and choose lower-sodium options.  3. Limit sugar  · Pause before you add sugars to pancakes, cereal, coffee, or tea. This includes white and brown table sugar, syrup, honey, and molasses. Cut your usual amount by half.  · Use non-sugar sweeteners. Stevia, aspartame, and sucralose can satisfy a sweet tooth without adding calories.  · Swap out sugar-filled soda and other drinks. Buy sugar-free or low-calorie beverages. Remember water is always the best choice.  · Read labels and choose foods with less added sugar. Keep in mind that dairy foods and foods with fruit will have some natural sugar.  · Cut the sugar in recipes by 1/3 to 1/2. Boost the flavor with extracts like almond, vanilla, or orange. Or add spices such as cinnamon or nutmeg.  4. Eat more fiber  · Eat fresh fruits and vegetables every day.  · Boost your diet with whole grains. Go for oats, whole-grain rice, and bran.  · Add beans and lentils to your meals.  · Drink more water to match your fiber  increase. This is to help prevent constipation.  Date Last Reviewed: 5/11/2015  © 1740-9831 The [employer]. [address] [place] [address], [place] [address]. All rights reserved. This information is not intended as a substitute for professional medical care. Always follow your healthcare professional's instructions.

## 2017-03-30 RX ORDER — METHYLPREDNISOLONE ACETATE 40 MG/ML
40 INJECTION, SUSPENSION INTRA-ARTICULAR; INTRALESIONAL; INTRAMUSCULAR; SOFT TISSUE
Status: DISCONTINUED | OUTPATIENT
Start: 2017-03-29 | End: 2017-03-30 | Stop reason: HOSPADM

## 2017-03-30 NOTE — PROGRESS NOTES
Referring Physician: No ref. provider found    PCP: Krissy Ramírez MD      CC:right knee pain    Interval history: Patient is known patient to our clinic.  She is known patient with lumbar DDD as well as cervical DDD.  She presents today with worsening right knee pain.  She has history of knee osteoarthritis.  Xrays today showed moderate degree of OA.  She states having constant aching knee pain that has worsened over the past month.  She has tried physical therapy with minimal benefit.   She denies any weakness.  No bowel bladder changes.   She takes norco 1/2 to 1 tab about once a day with moderate benefit.  She also take baclofen daily as needed with mild benefits.  No side effects reported.  She rates her pain 8/10 today.   Prior HPI:    Patient is 75-year-old female referred to us by her PCP for lower back pain and neck pain.  Lower back pain is more bothersome.  Pain has been present for over 10 years.  No traumatic incident.  She has constant aching, throbbing and sharp pain located in her posterior lower back.  Pain radiates to her buttocks and down her right leg at times.  Pain worsens with standing, bending, walking, lifting and getting out of chair.  She states having lumbar epidural steroid injections in the past with moderate benefit but none recently.  She denies any weakness.  No bowel bladder changes.  Neck pain is a constant aching posterior neck pain and pain radiates to her shoulders.  She denies any radiating arm pain.  No incoordination.  Neck pain is currently tolerable.  She rates her pain 5/10 today, 7/10 usually, 10/10 at worst, 4/10    Pain interventions history: s/p bilateral L4-5T TFESI on 12/14/15 and reports about 40% relief of her lower back pain    ROS:  CONSTITUTIONAL: No fevers, chills, night sweats, wt. loss, appetite changes  SKIN: no rashes or itching  ENT: No headaches, head trauma, vision changes, or eye pain  LYMPH NODES: None noticed   CV: No chest pain, palpitations.  "  RESP: No shortness of breath, dyspnea on exertion, cough, wheezing, or hemoptysis  GI: No nausea, emesis, diarrhea, constipation, melena, hematochezia, pain.    : No dysuria, hematuria, urgency, or frequency   HEME: No easy bruising, bleeding problems  PSYCHIATRIC: No psychosis, hallucinations.  + Anxiety and depression  NEURO: No seizures, memory loss, dizziness or difficulty sleeping  MSK: + History of present illness      Past Medical History:   Diagnosis Date    Allergy     Arthritis     Carotid artery disease     Coronary artery disease     Diabetes mellitus     Diabetes mellitus, type 2     Fall at home     GERD (gastroesophageal reflux disease)     Headache(784.0)     Hyperlipidemia     Hypertension     Mental disorder     Obesity     Right rib fracture     Snoring     Toe fracture, right      Past Surgical History:   Procedure Laterality Date    CHOLECYSTECTOMY      CORONARY STENT PLACEMENT      x 5    CYST REMOVAL      sebaceous cyst from back    EYE SURGERY      OVARIAN CYST REMOVAL      SINUS SURGERY      TONSILLECTOMY       Family History   Problem Relation Age of Onset    Heart disease Mother     Diabetes Mother     Cancer Mother      breast cancer    Heart disease Father     Heart disease Sister     Stroke Brother     Heart disease Brother     Dementia Neg Hx      Social History     Social History    Marital status:      Spouse name: N/A    Number of children: N/A    Years of education: N/A     Social History Main Topics    Smoking status: Never Smoker    Smokeless tobacco: Never Used    Alcohol use No    Drug use: No    Sexual activity: Not Asked     Other Topics Concern    None     Social History Narrative         Medications/Allergies: See med card    Vitals:    03/29/17 1507   BP: 126/72   Pulse: 61   Weight: 85.3 kg (188 lb 0.8 oz)   Height: 5' 5" (1.651 m)   PainSc: 10-Worst pain ever         Physical exam:    GENERAL: A and O x3, the patient " appears well groomed and is in no acute distress.  Skin: No rashes or obvious lesions  HEENT: normocephalic, atraumatic  CARDIOVASCULAR:  Palpable peripheral pulses  LUNGS: easy work of breathing  ABDOMEN: soft, nontender   UPPER EXTREMITIES: Normal alignment, normal range of motion, no atrophy, no skin changes,  hair growth and nail growth normal and equal bilaterally. No swelling, no tenderness.    LOWER EXTREMITIES:  Normal alignment, normal range of motion, no atrophy, no skin changes,  hair growth and nail growth normal and equal bilaterally. No swelling, no tenderness.  CERVICAL SPINE:  Cervical spine: ROM is full in flexion, extension and lateral rotation with moderate increased pain.  Spurling's maneuver causes neck pain to bilateral side.  Myofascial exam: No Tenderness to palpation across cervical paraspinous region bilaterally.    LUMBAR SPINE  Lumbar spine: ROM is limited with flexion extension and oblique extension with increased pain.    Hill's test causes no increased pain on either side.    Supine straight leg raise is negative bilaterally.    Internal and external rotation of the hip causes no increased pain on either side.  Myofascial exam: No tenderness to palpation across lumbar paraspinous muscles.      MENTAL STATUS: normal orientation, speech, language, and fund of knowledge for social situation.  Emotional state appropriate.    CRANIAL NERVES:  II:  PERRL bilaterally,   III,IV,VI: EOMI.    V:  Facial sensation equal bilaterally  VII:  Facial motor function normal.  VIII:  Hearing equal to finger rub bilaterally  IX/X: Gag normal, palate symmetric  XI:  Shoulder shrug equal, head turn equal  XII:  Tongue midline without fasciculations      MOTOR: Tone and bulk: normal bilateral upper and lower Strength: normal   Delt Bi Tri WE WF     R 5 5 5 5 5 5   L 5 5 5 5 5 5     IP ADD ABD Quad TA Gas HAM  R 5 5 5 5 5 5 5  L 5 5 5 5 5 5 5    SENSATION: Light touch and pinprick intact  bilaterally  REFLEXES: normal, symmetric, nonbrisk.  Toes down, no clonus. No hoffmans.  GAIT: normal rise, base, steps, and arm swing.        Imaging:  Xray L-spine 3/2015  AP, lateral, bilateral oblique and spot images of the lumbar spine are provided. The there is grade 1 spondylolisthesis at L4-5. At L4-5 there is displaced narrowing and vacuum phenomenon consistent with chronic degenerative disk disease. The   vertebral bodies are intact without evidence of fracture or compression. Spondylolysis is not seen. The rest of the disk bases are well maintained    Xray C-spine 10/2015  An anterior listhesis is noted of 4 millimeters of the C4 on C5 vertebral body. Intervertebral disk height loss is noted at the C5-C6 and C6-C7 levels.     The C7-T1 level is not well-visualized.     Osseous nerve foraminal narrowing is noted at the C3-C4 C4-C5 C5-C6 levels on the left and right.       MRI right UE 7/2016  1.  Degenerative cyst formation within the greater tuberosity corresponding to the radiographic finding.  2.  Rotator cuff tendinosis and small insertional tear of the anterior supraspinatus tendon.    Assessment:  Patient presents with  1. Primary osteoarthritis of right knee    2. DDD (degenerative disc disease), lumbar    3. Facet arthropathy          Plan:  - I have stressed the importance of physical activity and exercise to improve overall health  - Perform a right knee intra-articular injection today.   - She can take Norco 5 mg every 8 hours as needed for pain. Hold for any unwanted side effects.  Refill provided last visit.  - Follow upin 2 months

## 2017-04-12 ENCOUNTER — OFFICE VISIT (OUTPATIENT)
Dept: PSYCHIATRY | Facility: CLINIC | Age: 77
End: 2017-04-12
Payer: COMMERCIAL

## 2017-04-12 DIAGNOSIS — R41.3 MEMORY LOSS: ICD-10-CM

## 2017-04-12 DIAGNOSIS — F03.91 UNSPECIFIED DEMENTIA WITH BEHAVIORAL DISTURBANCE: Primary | ICD-10-CM

## 2017-04-12 DIAGNOSIS — F32.A DEPRESSION, UNSPECIFIED DEPRESSION TYPE: ICD-10-CM

## 2017-04-12 PROCEDURE — 96118 PR NEUROPSYCH TESTING BY PSYCH/PHYS: CPT | Mod: 59,S$GLB,, | Performed by: PSYCHOLOGIST

## 2017-04-12 PROCEDURE — 96119 PR NEUROPSYCH TESTING BY TECHNICIAN: CPT | Mod: 59,S$GLB,, | Performed by: PSYCHOLOGIST

## 2017-04-17 ENCOUNTER — TELEPHONE (OUTPATIENT)
Dept: PSYCHIATRY | Facility: CLINIC | Age: 77
End: 2017-04-17

## 2017-04-17 NOTE — PROGRESS NOTES
Psychiatry Initial Visit (PhD/LCSW)    Date:  4/12/2017                CPT Code: 80054    Referred by: Lesly Humphreys DNP    Chief complaint/reason for encounter:  Neuropsychological Evaluation    Clinical status of patient:  Outpatient    Met with:  Patient and son    History of present illness: Mrs. Jerica Rios is a 77 year old   female referred for Neuropsychological Evaluation on an outpatient basis due to subjective memory decline for the past 1 ½ years.  She reported she will go into a room, then not know why she went there. She looked to her son to provide additional history. He reported that her short-term memory seems to have declined incrementally over the past 1 ½ years. He reports she is often confused. One night she went to his room and woke him up at 2AM because she thought he had not come home that night; in reality he had come home as usual, had eaten dinner with her, and had watched TV with her, but she did not recall this. On another occasion she could not find her medications but later found them in the pressure cooker. She forgets to take the cover off her pet canarys cage and forgets to otherwise take care of the canary. She gets anxious when going to the doctor. She is hesitant to conduct business on the phone. She often repeats what is happening on a TV show despite her son sitting there and watching it with her. She also has auditory and visual hallucinations at night. She will see things moving or walking on the ceiling and will hear someone knocking when none of these things are happening. Upon direct questioning, they also reported that she misplaces personal items in the home; forgets conversations and events; repeats herself; forgets appointments; gets confused about what day it is; has to use a calendar to remind herself of things; forgets what she reads; has left food cooking on the stove; needs to be reminded to take her medications; needs help managing her  finances; loses her train of thought in conversation; and reports word-finding difficulty (not observed).  She does not drive. She is able to manage the household.  Her son reported it seems to be getting worse over time. No precipitant could be identified. She did fall off an elliptical machine and hit her head a while back, but her memory was already failing at that time. Family history is significant for a brother with memory problems/dementia in his 80s. Mrs. Rios has a long history of depression and anxiety, often untreated. She has never been hospitalized. She saw a psychiatrist once for a very brief time. Her PCP currently prescribes Zoloft and Klonopin prn, which she takes as prescribed. She reported current mild symptoms of depression and anxiety. She denied suicidal ideation. She was pleasant and cooperative in interview.     Pain scale: noncontributory    Symptoms:  Mood: depressed mood  Anxiety:  excessive anxiety/worry  Substance abuse: denied  Cognitive functioning:  memory decline    Psychiatric history: none    Medical history: memory loss, allergy, carotid artery disease, coronary artery disease, diabetes, GERD, hypertension, hyperlipidemia, and obesity. CT of the head completed on 2016 yielded these results: Mild brain atrophy with deep white matter ischemic changes otherwise negative head CT.  Dr. Humphreys additionally noted the possibility of enlarged ventricles when reviewing this study. MoCA completed in Neurology on 2016 yielded a score of 14/30, clearly in the impaired range.     Family history of psychiatric illness: brother with dementia, another brother who is a loner and asocial    Social history (marriage, employment, etc.):  Quit school in 9th grade. 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 Has not worked in many years.  twice.  from first , second   7 years ago. Two children, one from each  marriage. Son lives with her. No Mandaeism preference. Used to like to sew, garden, make flower arrangements but does not do these things now. Does simon and keeps up the house.    Substance use:   Alcohol: no   Drugs: no   Tobacco: no   Caffeine: 1 cup coffee per day, occasional cola    Strengths and Liabilities:   Strength:  Pt has good support from son.   Liability:  Pt has subjective memory loss.    Mental Status Exam:  General appearance:  appears stated age, neatly dressed, well groomed, seated in wheelchair, obese AAF  Speech:  normal tone, slow rate, no dysarthria  Level of cooperation:  cooperative  Thought processes:  logical, goal-directed  Mood:  euthymic  Thought content:  Positive for auditory and visual hallucinations; no illusions, no delusions, no active or passive homicidal thoughts, no active or passive suicidal ideation, no obsessions, no compulsions, no violence  Affect:  appropriate  Orientation:  oriented to person and place, but not to time - incorrect month, incorrect time of day  Memory:  Recent memory:  1 of 3 objects after brief delay.    Remote memory - intact  Attention span and concentration:  spelled HOUSE forward and backwards  Fund of general knowledge: 2 of 4 recent presidents  Abstract reasoning:    Similarities: abstract.    Proverbs: abstract.  Judgment and insight: limited  Language:  intact    Diagnostic impressions:  Dementia with behavioral disturbance F03.91  Memory loss R41.3  Depression F32.9    Plan:  Pt will complete Neuropsychological testing.  Report of Neuropsychological Evaluation will follow. It can be accessed through the Chart Review activity in Epic under the Notes tab.  It will be titled Psych Testing.  PCP will continue to manage any psychiatric symptoms.    Return to clinic:  as scheduled    Length of time:  45 minutes

## 2017-04-17 NOTE — PSYCH TESTING
[facility] [address] [place]  [address]  [phone]    REPORT OF NEUROPSYCHOLOGICAL EVALUATION    NAME: Jerica Rios  OC #: [id number]  : 1940    REFERRED BY: Lesly Humphreys N.P.    EVALUATED BY:  Zahira Torres, Ph.D., Clinical Psychologist  Fernando Dumas M.S., Psychometrician    DATE OF EVALUATION: 2017     EVALUATION PROCEDURES AND TIME:  Conducted by Psychologist:  Interpretation and report of test data  Review and integration of diagnostic interview, medical record, and test data  Conducted by Technician:  Repeatable Battery for the Assessment of Neuropsychological Status (RBANS)  Temporal Orientation Test  Naming Test from the Neuropsychological Assessment Battery (NAB)  Controlled Oral Word Association Test  Facial Recognition Test  Phan Visual Motor Gestalt Test  Wechsler Memory Scale IV Logical Memory & Visual Reproduction Battery (WMS-IV LMVR)  Wisconsin Card Sorting Test - 64 (WCST-64)  Trail Making Test, Parts A & B  Leonardo Depression Inventory - II (BDI-II)  Leonardo Anxiety Inventory (CATHERINE)  Time: CPT Code 20070 - 2 hours; CPT Code 73802 - 2 hours    EVALUATION FINDINGS:  The diagnostic interview revealed Mrs. Jerica Rios is a 77 year old right-handed -American female referred for Neuropsychological Evaluation on an outpatient basis due to subjective memory decline for the past 1 ½ years.  She reported she will go into a room, then not know why she went there. She looked to her son to provide additional history. He reported that her short-term memory seems to have declined incrementally over the past 1 ½ years. He reports she is often confused. One night she went to his room and woke him up at 2AM because she thought he had not come home that night; in reality he had come home as usual, had eaten dinner with her, and had watched TV with her, but she did not recall this. On another occasion she could not find her medications but later found them  in the pressure cooker. She forgets to take the cover off her pet canarys cage and forgets to otherwise take care of the canary. She is hesitant to conduct business on the phone. She often repeats what is happening on a TV show despite her son sitting there and watching it with her. She gets anxious when going to the doctor. She also has auditory and visual hallucinations at night. She will see things moving or walking on the ceiling and will hear someone knocking when none of these things are happening. Upon direct questioning, they also reported that she misplaces personal items in the home; forgets conversations and events; repeats herself; forgets appointments; gets confused about what day it is; has to use a calendar to remind herself of things; forgets what she reads; has left food cooking on the stove; needs to be reminded to take her medications; needs help managing her finances; loses her train of thought in conversation; and reports word-finding difficulty (not observed).  She does not drive. She is able to manage the household.  Her son reported it seems to be getting worse over time. No precipitant could be identified. She did fall off an elliptical machine and hit her head a while back, but her memory was already failing at that time. Family history is significant for a brother with memory problems/dementia in his 80s. Mrs. Rios has a long history of depression and anxiety, often untreated. She has never been hospitalized. She saw a psychiatrist once for a very brief time. Her PCP currently prescribes Zoloft and Klonopin prn, which she takes as prescribed. She reported current mild symptoms of depression and anxiety. She denied suicidal ideation. She was pleasant and cooperative in interview. The Mental Status Exam suggested reduced temporal orientation, recent memory, fund of general knowledge, and judgment/insight, with auditory and visual hallucinations at night.    The medical record also  revealed prior medical history of memory loss, allergy, carotid artery disease, coronary artery disease, diabetes, GERD, hypertension, hyperlipidemia, and obesity. CT of the head completed on 11/09/2016 yielded these results: Mild brain atrophy with deep white matter ischemic changes otherwise negative head CT.  Dr. Humphreys additionally noted the possibility of enlarged ventricles when reviewing this study. MoCA completed in Neurology on 11/03/2016 yielded a score of 14/30, clearly in the impaired range.      TEST DATA:  Neuropsychological tests administered by the technician revealed that the patient reported she quit school in 9th grade. 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 She has not worked in many years. She was alert and cooperative during the evaluation.  Effort on all tests was satisfactory to produce valid results.    Mrs. Rios obtained a total score of 55 on the RBANS, which is at the 0.1 percentile, suggesting severe impairment in general cognitive functioning.  Five areas were tested.  The Immediate Memory Index revealed severe impairment in the ability to remember verbal information immediately after it is presented, with a score of 61 at the 0.5 percentile.  The Visuospatial/Constructional Index revealed severe impairment in the ability to perceive spatial relations and to construct a spatially accurate copy of a drawing, with a score of 58 at the 0.3 percentile. Visuospatial perception was severely impaired. Constructional ability was moderately impaired. The Language Index revealed average ability to respond verbally to either naming or retrieving learned material, with a score of 90 at the 25th percentile. Naming was average. Verbal fluency was mildly impaired. Attention, or the capacity to remember and manipulate both visually and orally presented information in short-term storage, was severely impaired, with a score of 53 at the 0.1  percentile. Delayed memory, or anterograde memory capacity, was severely impaired, with a score of 60 at the 0.4 percentile. Subtest performances revealed mildly impaired figure recall, moderately impaired story recall, and severely impaired list recall and list recognition.  For reference, the expected average score on each index is 100, which is at the 50th percentile.    The Temporal Orientation Test indicated she was oriented to year, day of the month, and day of the week but not to month (1 month off) and time of day (1 hour off).  Her score on this measure suggested moderate impairment in temporal orientation.    Naming, as assessed by the NAB Test, was average.  Verbal fluency, as assessed by the Controlled Oral Word Association Test, was in the average range.    Performance on the Facial Recognition Test suggested no prosopagnosia was present, as her score was in the average range.  Reproductions of Phan Visual Motor Gestalt Test designs revealed mild constructional dyspraxia.    The WMS-IV LMVR was administered to further assess memory.  Her Immediate Memory Index of 66 was in the moderately impaired range and her Delayed Memory Index of 64 was in the severely impaired range. Her Auditory Memory Index of 71 was in the moderately impaired range and her Visual Memory Index of 63 was in the severely impaired range. The expected average score for each index is 100. Scaled scores of the memory indexes revealed moderately impaired immediate and delayed auditory and visual memory.    The WCST-64 was administered to assess abstract reasoning and conceptualization.  Her categories completed score (0) was in the mildly impaired range. Her total errors score (48) was in the mildly to moderately impaired range and her perseverative errors score (46) was in the moderately impaired range.  Her performance suggested mildly to moderately impaired abstract reasoning skills.    Her score on the Trail Making Test, Part A,  (138 seconds for completion) indicated that psychomotor speed was in the severely impaired range.  Part B was discontinued as she could not follow test instructions on the sample with assistance.     The BDI-II was administered to assess for depression.  Her score of 25 suggested moderate depression was present.  The CATHERINE was administered to assess for anxiety.  Her score of 21 suggested moderate anxiety was present.    SUMMARY AND RECOMMENDATIONS:  Mrs. Rios was referred for Neuropsychological Evaluation on an outpatient basis due to subjective memory decline for the past 1 ½ years.  Her general cognitive abilities as assessed by the RBANS were in the severely impaired range, with average language and severely impaired immediate verbal memory, visuospatial/constructional abilities, attention, and delayed memory.  Further assessment of specific cognitive abilities revealed no deficits in naming, verbal fluency, and facial recognition but temporal orientation, constructional ability, abstract reasoning, and psychomotor speed were impaired.  Additional memory assessment revealed moderately impaired immediate and delayed auditory and visual memory.  Personality test data suggested the presence of moderate depression and anxiety.  Neuropsychological test results suggest mild dementia with impairment in immediate and delayed auditory/verbal and visual memory, attention, temporal orientation, visuospatial/constructional abilities, abstract reasoning, and psychomotor speed; and variability in verbal fluency (average and mildly impaired performances); with depression and anxiety. Her PCP will continue to manage any psychiatric symptoms. Consideration could be given to initiation of medication to enhance/preserve memory.        Interpretation and report and coding were completed on 4/17/2017.

## 2017-04-18 RX ORDER — CLONAZEPAM 0.5 MG/1
TABLET ORAL
Qty: 30 TABLET | Refills: 0 | OUTPATIENT
Start: 2017-04-18

## 2017-05-03 ENCOUNTER — OFFICE VISIT (OUTPATIENT)
Dept: NEUROLOGY | Facility: CLINIC | Age: 77
End: 2017-05-03
Payer: MEDICARE

## 2017-05-03 VITALS
TEMPERATURE: 97 F | BODY MASS INDEX: 29.99 KG/M2 | HEIGHT: 65 IN | WEIGHT: 180 LBS | RESPIRATION RATE: 18 BRPM | SYSTOLIC BLOOD PRESSURE: 138 MMHG | HEART RATE: 62 BPM | DIASTOLIC BLOOD PRESSURE: 74 MMHG

## 2017-05-03 DIAGNOSIS — F41.8 DEPRESSION WITH ANXIETY: ICD-10-CM

## 2017-05-03 DIAGNOSIS — F03.A0 MILD DEMENTIA: Primary | ICD-10-CM

## 2017-05-03 PROCEDURE — 3078F DIAST BP <80 MM HG: CPT | Mod: S$GLB,,, | Performed by: NURSE PRACTITIONER

## 2017-05-03 PROCEDURE — 99215 OFFICE O/P EST HI 40 MIN: CPT | Mod: S$GLB,,, | Performed by: NURSE PRACTITIONER

## 2017-05-03 PROCEDURE — 99999 PR PBB SHADOW E&M-EST. PATIENT-LVL III: CPT | Mod: PBBFAC,,, | Performed by: NURSE PRACTITIONER

## 2017-05-03 PROCEDURE — 3075F SYST BP GE 130 - 139MM HG: CPT | Mod: S$GLB,,, | Performed by: NURSE PRACTITIONER

## 2017-05-03 PROCEDURE — 1159F MED LIST DOCD IN RCRD: CPT | Mod: S$GLB,,, | Performed by: NURSE PRACTITIONER

## 2017-05-03 PROCEDURE — 99499 UNLISTED E&M SERVICE: CPT | Mod: S$GLB,,, | Performed by: NURSE PRACTITIONER

## 2017-05-03 PROCEDURE — 1125F AMNT PAIN NOTED PAIN PRSNT: CPT | Mod: S$GLB,,, | Performed by: NURSE PRACTITIONER

## 2017-05-03 PROCEDURE — 1160F RVW MEDS BY RX/DR IN RCRD: CPT | Mod: S$GLB,,, | Performed by: NURSE PRACTITIONER

## 2017-05-03 RX ORDER — DONEPEZIL HYDROCHLORIDE 10 MG/1
10 TABLET, FILM COATED ORAL DAILY
Qty: 30 TABLET | Refills: 11 | Status: SHIPPED | OUTPATIENT
Start: 2017-05-31 | End: 2017-05-12 | Stop reason: DRUGHIGH

## 2017-05-03 RX ORDER — DONEPEZIL HYDROCHLORIDE 5 MG/1
TABLET, FILM COATED ORAL
Qty: 30 TABLET | Refills: 0 | Status: SHIPPED | OUTPATIENT
Start: 2017-05-03 | End: 2017-05-12 | Stop reason: SDUPTHER

## 2017-05-03 NOTE — MR AVS SNAPSHOT
[facility] Neurology  [address] [facility] [address] [place] [address]  Phone: [phone]  Fax: [phone]                  Jerica Rios   5/3/2017 10:15 AM   Office Visit    Description:  Female : 1940   Provider:  Lesly Humphreys NP   Department:  [facility] Neurology                To Do List           Future Appointments        Provider Department Dept Phone    2017 10:00 AM Dayne Cole MD [place] - Pain Management [phone]      Goals (5 Years of Data)     None       These Medications        Disp Refills Start End    donepezil (ARICEPT) 5 MG tablet 30 tablet 0 5/3/2017     Take 1 tablet daily as directed. After 4 weeks, increase to 10 mg daily.    Pharmacy: [facility]/pharmacy #[id number] - [place], [place] - [address]. Ph #: [phone]       donepezil (ARICEPT) 10 MG tablet 30 tablet 11 2017     Take 1 tablet (10 mg total) by mouth once daily. - Oral    Pharmacy: [facility]/pharmacy #[id number] - [place], [place] - [address]. Ph #: [phone]         [facility]     [facility] Nurse Care [facility] -  Assistance  Unless otherwise directed by your provider, please contact [facility], our nurse care line that is available for  assistance.     Registered nurses in the [facility] provide: appointment scheduling, clinical advisement, health education, and other advisory services.  Call: [phone] (toll free)               Medications           Message regarding Medications     Verify the changes and/or additions to your medication regime listed below are the same as discussed with your clinician today.  If any of these changes or additions are incorrect, please notify your healthcare provider.        START taking these NEW medications        Refills    donepezil (ARICEPT) 5 MG tablet 0    Sig: Take 1 tablet daily as directed. After 4 weeks, increase to 10 mg daily.    Class: Normal    donepezil (ARICEPT) 10 MG tablet 11    Starting on: 2017    Sig: Take 1  tablet (10 mg total) by mouth once daily.    Class: Normal    Route: Oral           Verify that the below list of medications is an accurate representation of the medications you are currently taking.  If none reported, the list may be blank. If incorrect, please contact your healthcare provider. Carry this list with you in case of emergency.           Current Medications     amlodipine (NORVASC) 10 MG tablet Take 1 tablet (10 mg total) by mouth once daily.    atorvastatin (LIPITOR) 40 MG tablet Take 1 tablet (40 mg total) by mouth once daily.    baclofen (LIORESAL) 10 MG tablet START WITH 1/2 TABLET EVERY EVENING X 1 WEEK THEN 1 INCREASE TO 1 TABLET EVERY EVENING    blood sugar diagnostic (TRUETEST TEST STRIPS) Strp 1 strip by Misc.(Non-Drug; Combo Route) route once daily.    clonazePAM (KLONOPIN) 0.5 MG tablet TAKE 1 TABLET BY MOUTH 1 TO 2 TIMES A DAY AS NEEDED    clopidogrel (PLAVIX) 75 mg tablet TAKE 1 TABLET BY MOUTH EVERY DAY -MUST FOLLOW UP WITH CARDIOLOGIST    esomeprazole (NEXIUM) 20 MG capsule Take 20 mg by mouth once daily. Patient takes OTC nexium    hydrochlorothiazide (HYDRODIURIL) 25 MG tablet Take 25 mg by mouth once daily.    hydrocodone-acetaminophen 5-325mg (NORCO) 5-325 mg per tablet Take 1 tablet by mouth every 8 (eight) hours as needed for Pain.    ipratropium (ATROVENT) 0.03 % nasal spray     irbesartan (AVAPRO) 300 MG tablet TAKE 1 TABLET BY MOUTH EVERY EVENING.    KLOR-CON SPRINKLE 10 mEq CpSR TAKE ONE CAPSULE BY MOUTH EVERY DAY    lancets & blood glucose strips Cmpk 1 each by Misc.(Non-Drug; Combo Route) route once daily.    lancets 33 gauge Misc 1 lancet by Misc.(Non-Drug; Combo Route) route once daily.    levothyroxine (SYNTHROID) 50 MCG tablet TAKE 1 TABLET BY MOUTH EVERY MORNING ON AN EMPTY STOMACH    magnesium oxide (MAG-OXIDE) 400 mg tablet Take 1 tablet (400 mg total) by mouth once daily. Every day    metformin (GLUCOPHAGE) 500 MG tablet TAKE 1 TABLET (500 MG TOTAL) BY MOUTH 2 (TWO)  "TIMES DAILY WITH MEALS.    metoprolol tartrate (LOPRESSOR) 25 MG tablet Take 1 tablet (25 mg total) by mouth 2 (two) times daily.    mometasone (NASONEX) 50 mcg/actuation nasal spray 2 sprays by Nasal route once daily.    MULTIVITAMIN ORAL Take 1 Package by mouth once daily. Pt takes Diabetic Vitamin Pack    nitroGLYCERIN (NITROSTAT) 0.4 MG SL tablet Place 1 tablet (0.4 mg total) under the tongue every 5 (five) minutes as needed for Chest pain. As directed PRN    potassium chloride (MICRO-K) 10 MEQ CpSR TAKE ONE CAPSULE BY MOUTH EVERY DAY    sertraline (ZOLOFT) 50 MG tablet TAKE 1 TABLET BY MOUTH EVERY DAY    sertraline (ZOLOFT) 50 MG tablet TAKE 1 TABLET BY MOUTH EVERY DAY    vitamin D 1000 units Tab Take 185 mg by mouth once daily.    donepezil (ARICEPT) 10 MG tablet Starting on May 31, 2017. Take 1 tablet (10 mg total) by mouth once daily.    donepezil (ARICEPT) 5 MG tablet Take 1 tablet daily as directed. After 4 weeks, increase to 10 mg daily.           Clinical Reference Information           Your Vitals Were     BP Pulse Temp Resp Height Weight    138/74 (BP Location: Left arm, Patient Position: Sitting, BP Method: Automatic) 62 97.4 °F (36.3 °C) (Oral) 18 5' 5" (1.651 m) 81.6 kg (180 lb)    BMI                29.95 kg/m2          Blood Pressure          Most Recent Value    BP  138/74      Allergies as of 5/3/2017     Amoxicillin-pot Clavulanate    Meperidine    Propoxyphene N-acetaminophen    Sulfa (Sulfonamide Antibiotics)      Immunizations Administered on Date of Encounter - 5/3/2017     None      Instructions    Begin donepezil (Aricept) 5 mg daily.   After 4 weeks, increase to 10 mg daily.    Stop Benadryl for sleep.     In 1-2 weeks, begin melatonin 3 mg. You can buy this at the pharmacy without a prescription.   Take 1 tablet one hour before bedtime.   You can increase this to 6 mg in one week if needed.     Let me know if you have any problems.     Follow up 4 months.          Language Assistance " Services     ATTENTION: Language assistance services are available, free of charge. Please call [phone].      ATENCIÓN: Si habla bill, tiene a ferguson disposición servicios gratuitos de asistencia lingüística. Llame al [phone].     CHÚ Ý: N?u b?n nói Ti?ng Vi?t, có các d?ch v? h? tr? ngôn ng? mi?n phí dành cho b?n. G?i s? [phone].         [facility] complies with applicable Federal civil rights laws and does not discriminate on the basis of race, color, national origin, age, disability, or sex.

## 2017-05-03 NOTE — PATIENT INSTRUCTIONS
Begin donepezil (Aricept) 5 mg daily.   After 4 weeks, increase to 10 mg daily.    Stop Benadryl for sleep.     In 1-2 weeks, begin melatonin 3 mg. You can buy this at the pharmacy without a prescription.   Take 1 tablet one hour before bedtime.   You can increase this to 6 mg in one week if needed.     Let me know if you have any problems.     Follow up 4 months.

## 2017-05-03 NOTE — PROGRESS NOTES
"Name: Jerica Rios  MRN: [id number]   CSN: [id number]      Date: 05/03/2017    Referring physician:  No referring provider defined for this encounter.    Chief Complaint / Interval History:   Results   History of Present Illness (HPI):    78 yo female returns with her son for results of NP testing. She describes her memory as fair. She does not appreciate any changes since last visit. Son agrees. He notes that she does repeat herself at times. She remains independent with ADLs and does not need reminders to bathe. She cooks on occasion when she feels like it but no issues with forgetting to turn off burners.    Sleep issues. Takes a couple of hours to fall asleep, even with Benadryl. States she has taken this for years.     Does have longstanding depression and anxiety- PCP follows for this.     Since last visit, her older brother (in his 80s) was diagnosed with dementia.     Date: 01/04/2017  History of Present Illness (HPI):  She feels things are "fair. Her son, who accompanies her today still notices some issues with short term memory. He describes an event after Kim where she could not find her pill box. He found it in the pressure cooker (was not on-had been cleaned and was ready to be put away).  They talked about having a family gathering with the family coming over for the holdiays and she would ask why they were coming over. He would remind her. He is havng to repeat info several times. They have watched [employer] cooking shows for years. The other day, she heard her name and asked who she is. She is independent with ADLs.      Date: 11/03/2016  History of Present Illness (HPI):  77 yo RH female who presents for evaluation of memory and is accompanied by her son, Rosalio. He says he initiated this visit. He has lived with her the past 7 years. He moved in when her  / his father became ill. She offers that she is aware of memory issues. He offers that she has always had a great deal of " "anxeity and takes clonazepam on occasion. He says sometimes, her emoitons get so unraveled. Over the past 5-6 years, he has noted some short term memory issues. She will forget things. He says it used to be she would forget something that happened two weeks ago but could recall with prompting..   She is able to feed, bathe, and dress self. In the past couple of weeks, she has had an aide while her son is at work.   Within past few weeks, he feels there has been an accelerator to her memory issues. Now she cannot recall a morning event that same evening. A few weeks ago, she described an earache and felt bad enough to go to ED. An infection was discovered and she was given IV antibiotics. At that point, she was also discovered to have hyponatremia and was admitted overnight for observation. The next day, she was sent home with home health. She followed up with ENT and infection was gone. Hearing test indicated slt hearing loss in B ears.  He offers that she is has always been paranoid to talk on the phone, even to family members. She always felt by doing this, some harm may come her way. Recently, medical Professionals have been calling to schedule times to come to home for therapies or assistance. She will agree. When they arrive, she felix s not know why they are there and is scared to open the door.   She is forgetting names of famiily members. She does get lost inside her home. She has not driven in years. She cooks sometimes and says she has no trouble. When asked if she ever burns food, son says a couple of days ago, she got sidetracked and burned vegetables in oven. She realized this when she smelled burning. Son has managed finances since his father .   He says home nurse has done labs a couple of times over the past couple of weeks. Everything has been fine except "stable anemia."   Diagnoses:   Memory Loss- MOCA 13+1=  CT head from  - ? Enlarged ventricles  Describes some bladder leakage. Had " falls earlier this year, not recent. Gait does not look consistent with NPH.      Nonmotor/Premotor ROS:  RBD/sleep issues (Constitutional)?Yes  Depression/anxiety (Psychiatric)?Yes  Cognitive impairment (Neurologic)?Yes  Psychoses (Psychiatric)?No      Past Medical History: The patient  has a past medical history of Allergy; Arthritis; Carotid artery disease; Coronary artery disease; Diabetes mellitus; Diabetes mellitus, type 2; Fall at home; GERD (gastroesophageal reflux disease); Headache; Hyperlipidemia; Hypertension; Mental disorder; Obesity; Right rib fracture; Snoring; and Toe fracture, right.    Social History: The patient  reports that she has never smoked. She has never used smokeless tobacco. She reports that she does not drink alcohol or use illicit drugs.    Family History: Their family history includes Cancer in her mother; Diabetes in her mother; Heart disease in her brother, father, mother, and sister; Stroke in her brother. There is no history of Dementia.    Allergies: Amoxicillin-pot clavulanate; Meperidine; Propoxyphene n-acetaminophen; and Sulfa (sulfonamide antibiotics)     Meds:   Current Outpatient Prescriptions on File Prior to Visit   Medication Sig Dispense Refill    amlodipine (NORVASC) 10 MG tablet Take 1 tablet (10 mg total) by mouth once daily. 90 tablet 3    atorvastatin (LIPITOR) 40 MG tablet Take 1 tablet (40 mg total) by mouth once daily. 90 tablet 3    baclofen (LIORESAL) 10 MG tablet START WITH 1/2 TABLET EVERY EVENING X 1 WEEK THEN 1 INCREASE TO 1 TABLET EVERY EVENING 90 tablet 0    blood sugar diagnostic (TRUETEST TEST STRIPS) Strp 1 strip by Misc.(Non-Drug; Combo Route) route once daily. 100 strip 3    clonazePAM (KLONOPIN) 0.5 MG tablet TAKE 1 TABLET BY MOUTH 1 TO 2 TIMES A DAY AS NEEDED 30 tablet 0    clopidogrel (PLAVIX) 75 mg tablet TAKE 1 TABLET BY MOUTH EVERY DAY -MUST FOLLOW UP WITH CARDIOLOGIST 30 tablet 11    esomeprazole (NEXIUM) 20 MG capsule Take 20 mg by  mouth once daily. Patient takes OTC nexium      hydrochlorothiazide (HYDRODIURIL) 25 MG tablet Take 25 mg by mouth once daily.  3    hydrocodone-acetaminophen 5-325mg (NORCO) 5-325 mg per tablet Take 1 tablet by mouth every 8 (eight) hours as needed for Pain. 90 tablet 0    ipratropium (ATROVENT) 0.03 % nasal spray       irbesartan (AVAPRO) 300 MG tablet TAKE 1 TABLET BY MOUTH EVERY EVENING. 90 tablet 2    KLOR-CON SPRINKLE 10 mEq CpSR TAKE ONE CAPSULE BY MOUTH EVERY DAY 30 capsule 4    lancets & blood glucose strips Cmpk 1 each by Misc.(Non-Drug; Combo Route) route once daily. 30 each 0    lancets 33 gauge Misc 1 lancet by Misc.(Non-Drug; Combo Route) route once daily. 100 each 3    levothyroxine (SYNTHROID) 50 MCG tablet TAKE 1 TABLET BY MOUTH EVERY MORNING ON AN EMPTY STOMACH 90 tablet 3    magnesium oxide (MAG-OXIDE) 400 mg tablet Take 1 tablet (400 mg total) by mouth once daily. Every day 90 tablet 3    metformin (GLUCOPHAGE) 500 MG tablet TAKE 1 TABLET (500 MG TOTAL) BY MOUTH 2 (TWO) TIMES DAILY WITH MEALS. 180 tablet 3    metoprolol tartrate (LOPRESSOR) 25 MG tablet Take 1 tablet (25 mg total) by mouth 2 (two) times daily. 180 tablet 3    mometasone (NASONEX) 50 mcg/actuation nasal spray 2 sprays by Nasal route once daily. 17 g 3    MULTIVITAMIN ORAL Take 1 Package by mouth once daily. Pt takes Diabetic Vitamin Pack      nitroGLYCERIN (NITROSTAT) 0.4 MG SL tablet Place 1 tablet (0.4 mg total) under the tongue every 5 (five) minutes as needed for Chest pain. As directed  tablet 3    potassium chloride (MICRO-K) 10 MEQ CpSR TAKE ONE CAPSULE BY MOUTH EVERY DAY 30 capsule 4    sertraline (ZOLOFT) 50 MG tablet TAKE 1 TABLET BY MOUTH EVERY DAY 30 tablet 0    sertraline (ZOLOFT) 50 MG tablet TAKE 1 TABLET BY MOUTH EVERY DAY 30 tablet 6    vitamin D 1000 units Tab Take 185 mg by mouth once daily.      [DISCONTINUED] ranitidine (ZANTAC) 150 MG tablet Take 1 tablet (150 mg total) by mouth 2  "(two) times daily. 60 tablet 11     No current facility-administered medications on file prior to visit.        Exam:  /74 (BP Location: Left arm, Patient Position: Sitting, BP Method: Automatic)  Pulse 62  Temp 97.4 °F (36.3 °C) (Oral)   Resp 18  Ht 5' 5" (1.651 m)  Wt 81.6 kg (180 lb)  BMI 29.95 kg/m2    Constitutional  Well-developed, well-nourished, appears stated age   Neurological    * Mental status  MOCA =deferred today      - Orientation  Oriented to person, place, and situation     - Memory   Not tested today. Does provide some history but will look to son after each answer to assure she answered correctly     - Attention/concentration  Attentive during exam     - Language +2-step commands     - Fund of knowledge  Aware of current events   * Cranial nerves       - CN III, IV, VI  Extraocular movements full, normal pursuits and saccades     - CN VII  Face strong and symmetric bilaterally     - CN VIII  Hearing intact to conversation   * Gait  Pushes self to stand. Ambulates with assist of rollator.      Laboratory/Radiological:  - Results:  Lab Visit on 03/29/2017   Component Date Value Ref Range Status    WBC 03/29/2017 9.95  3.90 - 12.70 K/uL Final    RBC 03/29/2017 4.66  4.00 - 5.40 M/uL Final    Hemoglobin 03/29/2017 12.1  12.0 - 16.0 g/dL Final    Hematocrit 03/29/2017 38.0  37.0 - 48.5 % Final    MCV 03/29/2017 82  82 - 98 fL Final    MCH 03/29/2017 26.0* 27.0 - 31.0 pg Final    MCHC 03/29/2017 31.8* 32.0 - 36.0 % Final    RDW 03/29/2017 15.6* 11.5 - 14.5 % Final    Platelets 03/29/2017 399* 150 - 350 K/uL Final    MPV 03/29/2017 10.2  9.2 - 12.9 fL Final    Gran # 03/29/2017 6.5  1.8 - 7.7 K/uL Final    Lymph # 03/29/2017 2.3  1.0 - 4.8 K/uL Final    Mono # 03/29/2017 1.0  0.3 - 1.0 K/uL Final    Eos # 03/29/2017 0.1  0.0 - 0.5 K/uL Final    Baso # 03/29/2017 0.03  0.00 - 0.20 K/uL Final    Gran% 03/29/2017 65.2  38.0 - 73.0 % Final    Lymph% 03/29/2017 23.5  18.0 - 48.0 % " Final    Mono% 03/29/2017 9.6  4.0 - 15.0 % Final    Eosinophil% 03/29/2017 1.2  0.0 - 8.0 % Final    Basophil% 03/29/2017 0.3  0.0 - 1.9 % Final    Differential Method 03/29/2017 Automated   Final    Cholesterol 03/29/2017 137  120 - 199 mg/dL Final    Triglycerides 03/29/2017 50  30 - 150 mg/dL Final    HDL 03/29/2017 61  40 - 75 mg/dL Final    LDL Cholesterol 03/29/2017 66.0  63.0 - 159.0 mg/dL Final    HDL/Chol Ratio 03/29/2017 44.5  20.0 - 50.0 % Final    Total Cholesterol/HDL Ratio 03/29/2017 2.2  2.0 - 5.0 Final    Non-HDL Cholesterol 03/29/2017 76  mg/dL Final    Sodium 03/29/2017 137  136 - 145 mmol/L Final    Potassium 03/29/2017 4.8  3.5 - 5.1 mmol/L Final    Chloride 03/29/2017 100  95 - 110 mmol/L Final    CO2 03/29/2017 28  23 - 29 mmol/L Final    Glucose 03/29/2017 106  70 - 110 mg/dL Final    BUN, Bld 03/29/2017 16  8 - 23 mg/dL Final    Creatinine 03/29/2017 1.0  0.5 - 1.4 mg/dL Final    Calcium 03/29/2017 10.3  8.7 - 10.5 mg/dL Final    Total Protein 03/29/2017 7.6  6.0 - 8.4 g/dL Final    Albumin 03/29/2017 4.2  3.5 - 5.2 g/dL Final    Total Bilirubin 03/29/2017 0.4  0.1 - 1.0 mg/dL Final    Alkaline Phosphatase 03/29/2017 126  55 - 135 U/L Final    AST 03/29/2017 18  10 - 40 U/L Final    ALT 03/29/2017 19  10 - 44 U/L Final    Anion Gap 03/29/2017 9  8 - 16 mmol/L Final    eGFR if African American 03/29/2017 >60.0  >60 mL/min/1.73 m^2 Final    eGFR if non African American 03/29/2017 54.5* >60 mL/min/1.73 m^2 Final    TSH 03/29/2017 1.848  0.400 - 4.000 uIU/mL Final    Vit D, 25-Hydroxy 03/29/2017 41  30 - 96 ng/mL Final    Hemoglobin A1C 03/29/2017 6.0  4.5 - 6.2 % Final    Estimated Avg Glucose 03/29/2017 126  68 - 131 mg/dL Final         NEUROPSYCHOLOGICAL SUMMARY AND RECOMMENDATIONS 4-2017:  Neuropsychological test results suggest mild dementia with impairment in immediate and delayed auditory/verbal and visual memory, attention, temporal orientation,  visuospatial/constructional abilities, abstract reasoning, and psychomotor speed; and variability in verbal fluency (average and mildly impaired performances); with depression and anxiety. Her PCP will continue to manage any psychiatric symptoms. Consideration could be given to initiation of medication to enhance/preserve memory.    Diagnoses:          Mild Dementia    --> MOCA 13+1=14/30    Depression and Anxiety, longstanding   -managed by PCP    Medical Decision Making:    Discussed at length with them about her results, dementia processes and progression, and treatment options (Aricept, Exelon, Razadyne, and Namenda)/ possible side effects.     Begin donepezil (Aricept) 5 mg daily.   After 4 weeks, increase to 10 mg daily.    Stop Benadryl for sleep.     In 1-2 weeks, begin melatonin 3 mg. Take 1 tablet one hour before bedtime.   May increase 3 mg weekly if needed (up to 10 mg).     Call for problems.     Follow up 4 months. Will attempt MOCA at that time.         I spent 40 minutes face-to-face with the patient and son with >85% of the time spent with counseling and education regarding:  - results of data, diagnosis, and recommendations stated above  - the prognosis of dementia  - risks and benefits of donepezil  - importance of diet and exercise    Lesly Humphreys, JONO, NP-C  Division of Movement and Memory Disorders  [facility]  [phone]

## 2017-05-12 ENCOUNTER — HOSPITAL ENCOUNTER (EMERGENCY)
Facility: HOSPITAL | Age: 77
Discharge: HOME | End: 2017-05-12
Attending: EMERGENCY MEDICINE
Payer: MEDICARE

## 2017-05-12 VITALS
RESPIRATION RATE: 16 BRPM | DIASTOLIC BLOOD PRESSURE: 67 MMHG | SYSTOLIC BLOOD PRESSURE: 148 MMHG | BODY MASS INDEX: 30.66 KG/M2 | TEMPERATURE: 98 F | OXYGEN SATURATION: 97 % | WEIGHT: 184 LBS | HEIGHT: 65 IN | HEART RATE: 68 BPM

## 2017-05-12 DIAGNOSIS — R53.83 FATIGUE, UNSPECIFIED TYPE: Primary | ICD-10-CM

## 2017-05-12 LAB
ALBUMIN SERPL BCP-MCNC: 4.4 G/DL
ALP SERPL-CCNC: 117 U/L
ALT SERPL W/O P-5'-P-CCNC: 18 U/L
ANION GAP SERPL CALC-SCNC: 14 MMOL/L
AST SERPL-CCNC: 16 U/L
BASOPHILS # BLD AUTO: 0.1 K/UL
BASOPHILS NFR BLD: 0.8 %
BILIRUB SERPL-MCNC: 0.4 MG/DL
BILIRUB UR QL STRIP: NEGATIVE
BUN SERPL-MCNC: 13 MG/DL
CALCIUM SERPL-MCNC: 10 MG/DL
CHLORIDE SERPL-SCNC: 100 MMOL/L
CLARITY UR: CLEAR
CO2 SERPL-SCNC: 25 MMOL/L
COLOR UR: YELLOW
CREAT SERPL-MCNC: 0.9 MG/DL
DIFFERENTIAL METHOD: ABNORMAL
EOSINOPHIL # BLD AUTO: 0.1 K/UL
EOSINOPHIL NFR BLD: 0.7 %
ERYTHROCYTE [DISTWIDTH] IN BLOOD BY AUTOMATED COUNT: 15.6 %
EST. GFR  (AFRICAN AMERICAN): >60 ML/MIN/1.73 M^2
EST. GFR  (NON AFRICAN AMERICAN): >60 ML/MIN/1.73 M^2
GLUCOSE SERPL-MCNC: 120 MG/DL
GLUCOSE UR QL STRIP: NEGATIVE
HCT VFR BLD AUTO: 38 %
HGB BLD-MCNC: 12 G/DL
HGB UR QL STRIP: NEGATIVE
KETONES UR QL STRIP: NEGATIVE
LEUKOCYTE ESTERASE UR QL STRIP: NEGATIVE
LYMPHOCYTES # BLD AUTO: 1.5 K/UL
LYMPHOCYTES NFR BLD: 11.5 %
MCH RBC QN AUTO: 25.7 PG
MCHC RBC AUTO-ENTMCNC: 31.7 %
MCV RBC AUTO: 81 FL
MONOCYTES # BLD AUTO: 0.5 K/UL
MONOCYTES NFR BLD: 4.1 %
NEUTROPHILS # BLD AUTO: 10.6 K/UL
NEUTROPHILS NFR BLD: 82.9 %
NITRITE UR QL STRIP: NEGATIVE
PH UR STRIP: 8 [PH] (ref 5–8)
PLATELET # BLD AUTO: 375 K/UL
PMV BLD AUTO: 8.4 FL
POTASSIUM SERPL-SCNC: 3.9 MMOL/L
PROT SERPL-MCNC: 7.5 G/DL
PROT UR QL STRIP: ABNORMAL
RBC # BLD AUTO: 4.68 M/UL
SODIUM SERPL-SCNC: 139 MMOL/L
SP GR UR STRIP: 1.01 (ref 1–1.03)
TROPONIN I SERPL DL<=0.01 NG/ML-MCNC: 0.01 NG/ML
TSH SERPL DL<=0.005 MIU/L-ACNC: 1.34 UIU/ML
URN SPEC COLLECT METH UR: ABNORMAL
UROBILINOGEN UR STRIP-ACNC: NEGATIVE EU/DL
WBC # BLD AUTO: 12.8 K/UL

## 2017-05-12 PROCEDURE — 81003 URINALYSIS AUTO W/O SCOPE: CPT

## 2017-05-12 PROCEDURE — 99283 EMERGENCY DEPT VISIT LOW MDM: CPT

## 2017-05-12 PROCEDURE — 36415 COLL VENOUS BLD VENIPUNCTURE: CPT

## 2017-05-12 PROCEDURE — 85025 COMPLETE CBC W/AUTO DIFF WBC: CPT

## 2017-05-12 PROCEDURE — 80053 COMPREHEN METABOLIC PANEL: CPT

## 2017-05-12 PROCEDURE — 84484 ASSAY OF TROPONIN QUANT: CPT

## 2017-05-12 PROCEDURE — 84443 ASSAY THYROID STIM HORMONE: CPT

## 2017-05-12 RX ORDER — DONEPEZIL HYDROCHLORIDE 10 MG/1
10 TABLET, FILM COATED ORAL NIGHTLY
COMMUNITY
Start: 2017-05-31 | End: 2017-08-16

## 2017-05-12 RX ORDER — DONEPEZIL HYDROCHLORIDE 5 MG/1
5 TABLET, FILM COATED ORAL NIGHTLY
COMMUNITY
Start: 2017-05-01 | End: 2017-05-30

## 2017-05-12 RX ORDER — TALC
3 POWDER (GRAM) TOPICAL NIGHTLY
COMMUNITY

## 2017-05-12 NOTE — ED PROVIDER NOTES
Encounter Date: 5/12/2017    SCRIBE #1 NOTE: Lucas VYAS am scribing for, and in the presence of, Dr. Vora.       History     Chief Complaint   Patient presents with    Fatigue     s/s x 1 week    Abdominal Pain     epigastric - denies trauma or decreased appetite/intake     Review of patient's allergies indicates:   Allergen Reactions    Amoxicillin-pot clavulanate      Other reaction(s): CONSTIPATION    Meperidine      Other reaction(s): irritability  Other reaction(s): aggressiveness    Propoxyphene n-acetaminophen      Other reaction(s): irritable  Other reaction(s): aggressive    Sulfa (sulfonamide antibiotics)      Other reaction(s): Rash     HPI Comments: 05/12/2017  3:59 PM     Chief Complaint: Fatigue    The patient is a 77 y.o. female who is presenting with fatigue that began 1 week ago. Pt also reports some very mild upper abdominal pain.  No chest pain.  Pt's son reports pt was recently prescribed Aricept for early stage dementia and Melatonin 3 MG for insomnia and symptoms began right after beginning these medications.  No other complaints. No aggravating or alleviating factors.       No urinary sx, fever, SOB, chills, N/V/D or numbness. Pt takes Metformin for DM. Pt has PMHx of allergy; arthritis; CAD; DM 2; fall at home; GERD; HA; HLD; HTN; mental disorder; obesity; R rib fracture; snoring; and toe fracture and PSHx of eye surgery; tonsillectomy; cyst removal; cholecystectomy; ovarian cyst removal; sinus surgery; and coronary stent placement.      The history is provided by the patient and a relative.     Past Medical History:   Diagnosis Date    Allergy     Arthritis     Carotid artery disease     Coronary artery disease     Diabetes mellitus     Diabetes mellitus, type 2     Fall at home     GERD (gastroesophageal reflux disease)     Headache     Hyperlipidemia     Hypertension     Mental disorder     Obesity     Right rib fracture     Snoring     Toe fracture, right       Past Surgical History:   Procedure Laterality Date    CHOLECYSTECTOMY      CORONARY STENT PLACEMENT      x 5    CYST REMOVAL      sebaceous cyst from back    EYE SURGERY      OVARIAN CYST REMOVAL      SINUS SURGERY      TONSILLECTOMY       Family History   Problem Relation Age of Onset    Heart disease Mother     Diabetes Mother     Cancer Mother      breast cancer    Heart disease Father     Heart disease Sister     Stroke Brother     Dementia Brother     Heart disease Brother      Social History   Substance Use Topics    Smoking status: Never Smoker    Smokeless tobacco: Never Used    Alcohol use No     Review of Systems   Constitutional: Positive for fatigue. Negative for chills and fever.   HENT: Negative for sore throat.    Respiratory: Negative for shortness of breath.    Cardiovascular: Negative for chest pain.   Gastrointestinal: Positive for abdominal pain (epigastric). Negative for nausea.   Genitourinary: Negative for decreased urine volume, difficulty urinating, dysuria and hematuria.   Musculoskeletal: Negative for back pain.   Skin: Negative for rash.   Neurological: Negative for weakness and numbness.   Hematological: Does not bruise/bleed easily.   All other systems reviewed and are negative.      Physical Exam   Initial Vitals   BP Pulse Resp Temp SpO2   05/12/17 1539 05/12/17 1539 05/12/17 1539 05/12/17 1539 05/12/17 1539   155/74 71 16 98.4 °F (36.9 °C) 99 %     Physical Exam    Nursing note and vitals reviewed.  Constitutional: She appears well-developed and well-nourished. She is not diaphoretic. No distress.   HENT:   Head: Normocephalic and atraumatic.   Eyes: EOM are normal.   Neck: Normal range of motion. Neck supple.   Cardiovascular: Normal rate and regular rhythm. Exam reveals no gallop and no friction rub.    No murmur heard.  Pulmonary/Chest: No respiratory distress. She has no wheezes. She has no rhonchi. She has no rales.   Abdominal: Soft. Normal appearance and  bowel sounds are normal. She exhibits no distension. There is no tenderness. There is no rebound and no guarding.   Patient has no abdominal tenderness at all palpation   Musculoskeletal: Normal range of motion.   Neurological: She is alert and oriented to person, place, and time. She has normal strength. No sensory deficit.   Skin: Skin is warm and dry.   Psychiatric: She has a normal mood and affect. Her behavior is normal. Judgment and thought content normal.         ED Course   Procedures  Labs Reviewed - No data to display          Medical Decision Making:   History:   I obtained history from: someone other than patient.       <> Summary of History: Son  Old Medical Records: I decided to obtain old medical records.  Old Records Summarized: records from clinic visits.  Clinical Tests:   Lab Tests: Ordered and Reviewed            Scribe Attestation:   Scribe #1: I performed the above scribed service and the documentation accurately describes the services I performed. I attest to the accuracy of the note.    Attending Attestation:           Physician Attestation for Scribe:  Physician Attestation Statement for Scribe #1: I, Dr. Vora, reviewed documentation, as scribed by Lucas Quinones  in my presence, and it is both accurate and complete.                 ED Course     Clinical Impression:   The encounter diagnosis was Fatigue, unspecified type.      77-year-old female history of diabetes hypertension CAD who presents to the ER for a week of fatigue which began after starting melatonin and Aricept.  She has some very mild abdominal pain according to her history which she has no tenderness at all on exam.  I do not believe she needs any imaging of her abdomen.  Labs are unremarkable.  Strength is normal throughout without any weakness to suggest a neuromuscular disease.  Well-appearing in the emergency department.  No further testing is warranted.  Patient is to follow up with primary care return to the ER  symptoms worsen or change.    Motor Strength:   Strength   Deltoids  Triceps  Biceps  Wrist Extension  Wrist Flexion  Hand     Upper:  R  5/5 5/5 5/5 5/5     L  5/5 5/5 5/5 5/5      Iliopsoas  Quadriceps  Knee   Flexion  Tibialis   anterior  Gastro- cnemius  EHL    Lower:  R    5/5 5/5 5/5      L    5/5 5/5 5/5              Han Vora MD  05/12/17 1934

## 2017-05-12 NOTE — ED AVS SNAPSHOT
[facility] [address] [place] [address]               Jerica Rios   2017  3:43 PM   ED    Description:  Female : 1940   Department:  [facility]           Your Care was Coordinated By:     Provider Role From To    Han Vora MD Attending Provider 17 1545 --      Reason for Visit     Fatigue     Abdominal Pain           Diagnoses this Visit        Comments    Fatigue, unspecified type    -  Primary       ED Disposition     None           To Do List           Follow-up Information     Follow up with Krissy Ramírez MD.    Specialty:  Family Medicine    Why:  As needed    Contact information:    [address]  [facility] [address]  [phone]          Follow up with [facility].    Specialty:  Emergency Medicine    Why:  As needed, If symptoms worsen    Contact information:    [address] [place] [id number] [phone]      [facility]     [facility] Care Line -  Assistance  Unless otherwise directed by your provider, please contact [facility], our nurse care line that is available for  assistance.     Registered nurses in the [facility] provide: appointment scheduling, clinical advisement, health education, and other advisory services.  Call: [phone] (toll free)               Medications           Message regarding Medications     Verify the changes and/or additions to your medication regime listed below are the same as discussed with your clinician today.  If any of these changes or additions are incorrect, please notify your healthcare provider.             Verify that the below list of medications is an accurate representation of the medications you are currently taking.  If none reported, the list may be blank. If incorrect, please contact your healthcare provider. Carry this list with you in case of emergency.            Current Medications     amlodipine (NORVASC) 10 MG tablet Take 1 tablet (10 mg total) by mouth once daily.    atorvastatin (LIPITOR) 40 MG tablet Take 1 tablet (40 mg total) by mouth once daily.    baclofen (LIORESAL) 10 MG tablet START WITH 1/2 TABLET EVERY EVENING X 1 WEEK THEN 1 INCREASE TO 1 TABLET EVERY EVENING    clonazePAM (KLONOPIN) 0.5 MG tablet TAKE 1 TABLET BY MOUTH 1 TO 2 TIMES A DAY AS NEEDED    clopidogrel (PLAVIX) 75 mg tablet TAKE 1 TABLET BY MOUTH EVERY DAY -MUST FOLLOW UP WITH CARDIOLOGIST    donepezil (ARICEPT) 5 MG tablet Take 5 mg by mouth every evening.    esomeprazole (NEXIUM) 20 MG capsule Take 20 mg by mouth once daily. Patient takes OTC nexium    hydrochlorothiazide (HYDRODIURIL) 25 MG tablet Take 25 mg by mouth once daily.    irbesartan (AVAPRO) 300 MG tablet TAKE 1 TABLET BY MOUTH EVERY EVENING.    levothyroxine (SYNTHROID) 50 MCG tablet TAKE 1 TABLET BY MOUTH EVERY MORNING ON AN EMPTY STOMACH    magnesium oxide (MAG-OXIDE) 400 mg tablet Take 1 tablet (400 mg total) by mouth once daily. Every day    melatonin 3 mg Tab Take 3 mg by mouth nightly.    metformin (GLUCOPHAGE) 500 MG tablet TAKE 1 TABLET (500 MG TOTAL) BY MOUTH 2 (TWO) TIMES DAILY WITH MEALS.    metoprolol tartrate (LOPRESSOR) 25 MG tablet Take 1 tablet (25 mg total) by mouth 2 (two) times daily.    mometasone (NASONEX) 50 mcg/actuation nasal spray 2 sprays by Nasal route once daily.    MULTIVITAMIN ORAL Take 1 Package by mouth once daily. Pt takes Diabetic Vitamin Pack    potassium chloride (MICRO-K) 10 MEQ CpSR TAKE ONE CAPSULE BY MOUTH EVERY DAY    sertraline (ZOLOFT) 50 MG tablet TAKE 1 TABLET BY MOUTH EVERY DAY    vitamin D 1000 units Tab Take 185 mg by mouth once daily.    blood sugar diagnostic (TRUETEST TEST STRIPS) Strp 1 strip by Misc.(Non-Drug; Combo Route) route once daily.    donepezil (ARICEPT) 10 MG tablet Starting on May 31, 2017. Take 10 mg by mouth every evening.    hydrocodone-acetaminophen 5-325mg (NORCO)  "5-325 mg per tablet Take 1 tablet by mouth every 8 (eight) hours as needed for Pain.    ipratropium (ATROVENT) 0.03 % nasal spray     lancets & blood glucose strips Cmpk 1 each by Misc.(Non-Drug; Combo Route) route once daily.    lancets 33 gauge Misc 1 lancet by Misc.(Non-Drug; Combo Route) route once daily.    nitroGLYCERIN (NITROSTAT) 0.4 MG SL tablet Place 1 tablet (0.4 mg total) under the tongue every 5 (five) minutes as needed for Chest pain. As directed PRN           Clinical Reference Information           Your Vitals Were     BP Pulse Temp Resp Height Weight    148/67 68 98.4 °F (36.9 °C) (Oral) 16 5' 5" (1.651 m) 83.5 kg (184 lb)    SpO2 BMI             97% 30.62 kg/m2         Allergies as of 5/12/2017        Reactions    Amoxicillin-pot Clavulanate     Other reaction(s): CONSTIPATION    Meperidine     Other reaction(s): irritability  Other reaction(s): aggressiveness    Propoxyphene N-acetaminophen     Other reaction(s): irritable  Other reaction(s): aggressive    Sulfa (Sulfonamide Antibiotics)     Other reaction(s): Rash      Immunizations Administered on Date of Encounter - 5/12/2017     None      ED Micro, Lab, POCT     Start Ordered       Status Ordering Provider    05/12/17 1604 05/12/17 1603  CBC auto differential  STAT      Final result     05/12/17 1604 05/12/17 1603  Comprehensive metabolic panel  STAT      Final result     05/12/17 1604 05/12/17 1603  Urinalysis - Clean Catch  STAT      Final result     05/12/17 1604 05/12/17 1603  TSH  Once      Final result     05/12/17 1604 05/12/17 1603  Troponin I  STAT      Final result       ED Imaging Orders     None        Discharge Instructions         Weakness (Uncertain Cause)  Based on your exam today, the exact cause of your weakness is not certain. However, your weakness does not seem to be a sign of a serious illness at this time. Keep an eye on your symptoms and get medical advice as instructed below.  Home care  · Rest at home today. Do not " over-exert yourself.  · Take any medicine as prescribed.  · For the next few days, drink extra fluids (unless your healthcare provider wants you to restrict fluids for other reasons). Do not skip meals.  Follow-up care  Follow up with your healthcare provider or as advised.  When to seek medical advice  Call your healthcare provider for any of the following  · Worsening of your symptoms  · Symptoms don't start getting better within 2 days  · Fever of 100.4º F (38º C) or higher, or as directed by your healthcare provider·    Call 911  Get emergency medical care for any of these:  · Chest, arm, neck, jaw or upper back pain  · Trouble breathing  · Numbness or weakness of the face, one arm or one leg  · Slurred speech, confusion, trouble speaking, walking or seeing  · Blood in vomit or stool (black or red color)  · Loss of consciousness  Date Last Reviewed: 6/10/2015  © 1973-9387 [employer]. [address] [place] [address]. All rights reserved. This information is not intended as a substitute for professional medical care. Always follow your healthcare professional's instructions.          Your Scheduled Appointments     Jun 14, 2017 10:45 AM CDT   Established Patient Visit with MD [place] [facility] ([facility] [place] [facility] - Building [address]  [place] [id number] [phone]               [facility] complies with applicable Federal civil rights laws and does not discriminate on the basis of race, color, national origin, age, disability, or sex.        Language Assistance Services     ATTENTION: Language assistance services are available, free of charge. Please call [phone].      ATENCIÓN: Si habla español, tiene a ferguson disposición servicios gratuitos de asistencia lingüística. Llame al [phone].     CHÚ Ý: N?u b?n nói Ti?ng Vi?t, có các d?ch v? h? tr? ngôn ng? mi?n phí dành cho b?n. G?i s? [phone].

## 2017-05-12 NOTE — DISCHARGE INSTRUCTIONS
Weakness (Uncertain Cause)  Based on your exam today, the exact cause of your weakness is not certain. However, your weakness does not seem to be a sign of a serious illness at this time. Keep an eye on your symptoms and get medical advice as instructed below.  Home care  · Rest at home today. Do not over-exert yourself.  · Take any medicine as prescribed.  · For the next few days, drink extra fluids (unless your healthcare provider wants you to restrict fluids for other reasons). Do not skip meals.  Follow-up care  Follow up with your healthcare provider or as advised.  When to seek medical advice  Call your healthcare provider for any of the following  · Worsening of your symptoms  · Symptoms don't start getting better within 2 days  · Fever of 100.4º F (38º C) or higher, or as directed by your healthcare provider·    Call 911  Get emergency medical care for any of these:  · Chest, arm, neck, jaw or upper back pain  · Trouble breathing  · Numbness or weakness of the face, one arm or one leg  · Slurred speech, confusion, trouble speaking, walking or seeing  · Blood in vomit or stool (black or red color)  · Loss of consciousness  Date Last Reviewed: 6/10/2015  © 3058-5509 [employer]. [address] [place] [address], [place] [address]. All rights reserved. This information is not intended as a substitute for professional medical care. Always follow your healthcare professional's instructions.

## 2017-05-22 RX ORDER — BACLOFEN 10 MG/1
TABLET ORAL
Qty: 90 TABLET | Refills: 0 | Status: SHIPPED | OUTPATIENT
Start: 2017-05-22 | End: 2017-08-23 | Stop reason: SDUPTHER

## 2017-05-23 RX ORDER — CLONAZEPAM 0.5 MG/1
TABLET ORAL
Qty: 30 TABLET | Refills: 0 | Status: SHIPPED | OUTPATIENT
Start: 2017-05-23 | End: 2017-08-30 | Stop reason: SDUPTHER

## 2017-06-21 ENCOUNTER — HOSPITAL ENCOUNTER (EMERGENCY)
Facility: HOSPITAL | Age: 77
Discharge: HOME | End: 2017-06-22
Attending: EMERGENCY MEDICINE
Payer: MEDICARE

## 2017-06-21 VITALS
TEMPERATURE: 97 F | DIASTOLIC BLOOD PRESSURE: 67 MMHG | HEART RATE: 60 BPM | RESPIRATION RATE: 18 BRPM | BODY MASS INDEX: 30.66 KG/M2 | HEIGHT: 65 IN | OXYGEN SATURATION: 100 % | WEIGHT: 184 LBS | SYSTOLIC BLOOD PRESSURE: 150 MMHG

## 2017-06-21 DIAGNOSIS — R55 NEAR SYNCOPE: Primary | ICD-10-CM

## 2017-06-21 DIAGNOSIS — R53.1 WEAKNESS: ICD-10-CM

## 2017-06-21 DIAGNOSIS — S09.90XA HEAD INJURY: ICD-10-CM

## 2017-06-21 LAB
ALBUMIN SERPL BCP-MCNC: 4.3 G/DL
ALP SERPL-CCNC: 148 U/L
ALT SERPL W/O P-5'-P-CCNC: 21 U/L
ANION GAP SERPL CALC-SCNC: 13 MMOL/L
AST SERPL-CCNC: 17 U/L
BASOPHILS # BLD AUTO: 0.1 K/UL
BASOPHILS NFR BLD: 1 %
BILIRUB SERPL-MCNC: 0.4 MG/DL
BILIRUB UR QL STRIP: NEGATIVE
BNP SERPL-MCNC: 39 PG/ML
BUN SERPL-MCNC: 14 MG/DL
CALCIUM SERPL-MCNC: 10.2 MG/DL
CHLORIDE SERPL-SCNC: 100 MMOL/L
CLARITY UR: CLEAR
CO2 SERPL-SCNC: 25 MMOL/L
COLOR UR: YELLOW
CREAT SERPL-MCNC: 1 MG/DL
DIFFERENTIAL METHOD: ABNORMAL
EOSINOPHIL # BLD AUTO: 0.2 K/UL
EOSINOPHIL NFR BLD: 1.3 %
ERYTHROCYTE [DISTWIDTH] IN BLOOD BY AUTOMATED COUNT: 15.4 %
EST. GFR  (AFRICAN AMERICAN): >60 ML/MIN/1.73 M^2
EST. GFR  (NON AFRICAN AMERICAN): 54 ML/MIN/1.73 M^2
GLUCOSE SERPL-MCNC: 135 MG/DL
GLUCOSE UR QL STRIP: NEGATIVE
HCT VFR BLD AUTO: 38.3 %
HGB BLD-MCNC: 12.5 G/DL
HGB UR QL STRIP: NEGATIVE
KETONES UR QL STRIP: NEGATIVE
LACTATE SERPL-SCNC: 2.2 MMOL/L
LEUKOCYTE ESTERASE UR QL STRIP: NEGATIVE
LYMPHOCYTES # BLD AUTO: 2 K/UL
LYMPHOCYTES NFR BLD: 17.2 %
MCH RBC QN AUTO: 26.2 PG
MCHC RBC AUTO-ENTMCNC: 32.7 %
MCV RBC AUTO: 80 FL
MONOCYTES # BLD AUTO: 0.6 K/UL
MONOCYTES NFR BLD: 4.8 %
NEUTROPHILS # BLD AUTO: 8.7 K/UL
NEUTROPHILS NFR BLD: 75.7 %
NITRITE UR QL STRIP: NEGATIVE
PH UR STRIP: 6 [PH] (ref 5–8)
PLATELET # BLD AUTO: 401 K/UL
PMV BLD AUTO: 8.3 FL
POTASSIUM SERPL-SCNC: 3.7 MMOL/L
PROT SERPL-MCNC: 7.5 G/DL
PROT UR QL STRIP: NEGATIVE
RBC # BLD AUTO: 4.77 M/UL
SODIUM SERPL-SCNC: 138 MMOL/L
SP GR UR STRIP: 1.01 (ref 1–1.03)
TROPONIN I SERPL DL<=0.01 NG/ML-MCNC: <0.006 NG/ML
TSH SERPL DL<=0.005 MIU/L-ACNC: 0.95 UIU/ML
URN SPEC COLLECT METH UR: NORMAL
UROBILINOGEN UR STRIP-ACNC: NEGATIVE EU/DL
WBC # BLD AUTO: 11.6 K/UL

## 2017-06-21 PROCEDURE — 96374 THER/PROPH/DIAG INJ IV PUSH: CPT

## 2017-06-21 PROCEDURE — 36415 COLL VENOUS BLD VENIPUNCTURE: CPT

## 2017-06-21 PROCEDURE — 81003 URINALYSIS AUTO W/O SCOPE: CPT

## 2017-06-21 PROCEDURE — 25000003 PHARM REV CODE 250: Performed by: EMERGENCY MEDICINE

## 2017-06-21 PROCEDURE — 99284 EMERGENCY DEPT VISIT MOD MDM: CPT | Mod: 25

## 2017-06-21 PROCEDURE — 80053 COMPREHEN METABOLIC PANEL: CPT

## 2017-06-21 PROCEDURE — 85025 COMPLETE CBC W/AUTO DIFF WBC: CPT

## 2017-06-21 PROCEDURE — 63600175 PHARM REV CODE 636 W HCPCS: Performed by: EMERGENCY MEDICINE

## 2017-06-21 PROCEDURE — 93005 ELECTROCARDIOGRAM TRACING: CPT

## 2017-06-21 PROCEDURE — 84443 ASSAY THYROID STIM HORMONE: CPT

## 2017-06-21 PROCEDURE — 83880 ASSAY OF NATRIURETIC PEPTIDE: CPT

## 2017-06-21 PROCEDURE — 83605 ASSAY OF LACTIC ACID: CPT

## 2017-06-21 PROCEDURE — 84484 ASSAY OF TROPONIN QUANT: CPT

## 2017-06-21 PROCEDURE — 96361 HYDRATE IV INFUSION ADD-ON: CPT

## 2017-06-21 RX ORDER — ONDANSETRON 2 MG/ML
4 INJECTION INTRAMUSCULAR; INTRAVENOUS
Status: COMPLETED | OUTPATIENT
Start: 2017-06-21 | End: 2017-06-21

## 2017-06-21 RX ORDER — ACETAMINOPHEN 500 MG
1000 TABLET ORAL
Status: COMPLETED | OUTPATIENT
Start: 2017-06-21 | End: 2017-06-21

## 2017-06-21 RX ADMIN — SODIUM CHLORIDE 500 ML: 0.9 INJECTION, SOLUTION INTRAVENOUS at 05:06

## 2017-06-21 RX ADMIN — ONDANSETRON 4 MG: 2 INJECTION INTRAMUSCULAR; INTRAVENOUS at 05:06

## 2017-06-21 RX ADMIN — ACETAMINOPHEN 1000 MG: 500 TABLET ORAL at 06:06

## 2017-06-21 NOTE — ED NOTES
Assumed care from CW, RN.  Patient awake, alert and oriented x 3, skin warm and dry, in NAD with family at bedside.

## 2017-06-21 NOTE — ED PROVIDER NOTES
"Encounter Date: 6/21/2017    SCRIBE #1 NOTE: I, Mary Lou Monzon , am scribing for, and in the presence of,  Dr. Lopez . I have scribed the entire note.       History     Chief Complaint   Patient presents with    Fatigue     Reports feeling weak and lethargic.  Onset after noon today.         06/21/2017  4:09 PM     Chief Complaint: Generalized weakness       The patient is a 77 y.o. Female with a PMHx of Carotid artery disease; Coronary artery disease; Diabetes mellitus; Diabetes mellitus, type 2; Headache; Hyperlipidemia; Hypertension; Mental disorder  who is presenting with the acute onset of generalized weakness that began x 4 hours ago. Pt reports that the weakness is "diffuse and her whole body". No exacerbating or mitigating factors. She endorses associated nausea. Pt denies any specific weakness or unilateral weakness. No recent UTI or cardiac sx. Pertinent past surgical hx includes coronary stent placement.          The history is provided by the patient, medical records and a relative.     Review of patient's allergies indicates:   Allergen Reactions    Amoxicillin-pot clavulanate      Other reaction(s): CONSTIPATION    Meperidine      Other reaction(s): irritability  Other reaction(s): aggressiveness    Propoxyphene n-acetaminophen      Other reaction(s): irritable  Other reaction(s): aggressive    Sulfa (sulfonamide antibiotics)      Other reaction(s): Rash     Past Medical History:   Diagnosis Date    Allergy     Arthritis     Carotid artery disease     Coronary artery disease     Diabetes mellitus     Diabetes mellitus, type 2     Fall at home     GERD (gastroesophageal reflux disease)     Headache     Hyperlipidemia     Hypertension     Mental disorder     Obesity     Right rib fracture     Snoring     Toe fracture, right      Past Surgical History:   Procedure Laterality Date    CHOLECYSTECTOMY      CORONARY STENT PLACEMENT      x 5    CYST REMOVAL      sebaceous cyst from back "    EYE SURGERY      OVARIAN CYST REMOVAL      SINUS SURGERY      TONSILLECTOMY       Family History   Problem Relation Age of Onset    Heart disease Mother     Diabetes Mother     Cancer Mother      breast cancer    Heart disease Father     Heart disease Sister     Stroke Brother     Dementia Brother     Heart disease Brother      Social History   Substance Use Topics    Smoking status: Never Smoker    Smokeless tobacco: Never Used    Alcohol use No     Review of Systems   Constitutional: Negative for fever.   HENT: Negative for sore throat.    Eyes: Negative for visual disturbance.   Respiratory: Negative for shortness of breath.    Cardiovascular: Negative for chest pain.   Gastrointestinal: Positive for nausea.   Genitourinary: Negative for dysuria.   Musculoskeletal: Negative for back pain.   Skin: Negative for rash.   Neurological: Positive for weakness.   Hematological: Does not bruise/bleed easily.   Psychiatric/Behavioral: Negative for confusion.       Physical Exam     Initial Vitals [06/21/17 1509]   BP Pulse Resp Temp SpO2   (!) 173/76 (!) 54 20 98.8 °F (37.1 °C) 98 %     Physical Exam    Nursing note and vitals reviewed.  Constitutional: She appears well-developed and well-nourished. No distress.   HENT:   Head: Normocephalic and atraumatic.   Right Ear: External ear normal.   Left Ear: External ear normal.   Eyes: Conjunctivae are normal. Pupils are equal, round, and reactive to light. Right eye exhibits no discharge. Left eye exhibits no discharge.   Neck: Normal range of motion. Neck supple.   Cardiovascular: Normal rate, regular rhythm and normal heart sounds. Exam reveals no gallop and no friction rub.    No murmur heard.  Pulmonary/Chest: Breath sounds normal. She has no wheezes. She has no rhonchi. She has no rales.   Abdominal: Soft. Bowel sounds are normal. There is no tenderness. There is no guarding.   Musculoskeletal: Normal range of motion. She exhibits no edema or tenderness.    Neurological: She is alert and oriented to person, place, and time. She has normal strength. No sensory deficit.   Skin: Skin is warm and dry. Capillary refill takes less than 2 seconds.         ED Course   Procedures  Labs Reviewed   CBC W/ AUTO DIFFERENTIAL - Abnormal; Notable for the following:        Result Value    MCV 80 (*)     MCH 26.2 (*)     RDW 15.4 (*)     Platelets 401 (*)     MPV 8.3 (*)     Gran # 8.7 (*)     Gran% 75.7 (*)     Lymph% 17.2 (*)     All other components within normal limits   COMPREHENSIVE METABOLIC PANEL - Abnormal; Notable for the following:     Glucose 135 (*)     Alkaline Phosphatase 148 (*)     eGFR if non  54 (*)     All other components within normal limits   B-TYPE NATRIURETIC PEPTIDE   LACTIC ACID, PLASMA   URINALYSIS   TSH   TROPONIN I             Medical Decision Making:   History:   Old Medical Records: I decided to obtain old medical records.  Initial Assessment:   Ddx includes UTI, PNA, ACS, and dehydration.     Pt with non specific generalized weakness, will perform infectious and cardiac workup and I have low suspicion and likely DC home.   Independently Interpreted Test(s):   I have ordered and independently interpreted X-rays - see summary below.       <> Summary of X-Ray Reading(s): CXR: nad    Head CT: No acute process  I have ordered and independently interpreted EKG Reading(s) - see summary below       <> Summary of EKG Reading(s): EKG: nsr at 62, nl intervals, no st elevation or depression, nl axis  Clinical Tests:   Lab Tests: Ordered and Reviewed  Radiological Study: Ordered  Medical Tests: Ordered  Other:   I discussed test(s) with the performing physician.       <> Summary of the Findings: Head CT: No acute process  Patient feels better after hydration.  Unclear etiology for her episode of near syncope/weakness.  We'll discharge home and have follow-up with her primary care physician.            Scribe Attestation:   Scribe #1: I performed  the above scribed service and the documentation accurately describes the services I performed. I attest to the accuracy of the note.    Attending Attestation:           Physician Attestation for Scribe:  Physician Attestation Statement for Scribe #1: I, Dr. Lopez , reviewed documentation, as scribed by Mary Lou Monzon in my presence, and it is both accurate and complete.                 ED Course     Clinical Impression:   The primary encounter diagnosis was Near syncope. Diagnoses of Weakness and Head injury were also pertinent to this visit.                           Kai Lopez III, MD  07/04/17 5754

## 2017-06-21 NOTE — ED NOTES
I assisted pt to restroom. She walked with a steady gait to restroom via walker. I attempted to go in restroom with pt and she stated she was fine  So I waited outside the door. I knocked 3 times while she was in there and asked pt if she was ok and she replied yes She was fine. I then heard a noise, and when I opened the door, she was on the floor. She stated she was sorry, she was fine she just got a little overheated and hot and weak. I helped her up and she walked back to room with walker. Stated she felt fine, and did not hit her head. She just fell on her left side. Nurse and MD made aware. Son also informed by patient that she was fine and she fell but she is fine.

## 2017-06-23 DIAGNOSIS — E03.9 HYPOTHYROIDISM: ICD-10-CM

## 2017-06-23 RX ORDER — LEVOTHYROXINE SODIUM 50 UG/1
TABLET ORAL
Qty: 90 TABLET | Refills: 3 | Status: SHIPPED | OUTPATIENT
Start: 2017-06-23 | End: 2018-06-24 | Stop reason: SDUPTHER

## 2017-07-10 RX ORDER — ATORVASTATIN CALCIUM 40 MG/1
40 TABLET, FILM COATED ORAL DAILY
Qty: 90 TABLET | Refills: 3 | Status: SHIPPED | OUTPATIENT
Start: 2017-07-10 | End: 2017-11-24 | Stop reason: SDUPTHER

## 2017-07-12 ENCOUNTER — PATIENT MESSAGE (OUTPATIENT)
Dept: PAIN MEDICINE | Facility: CLINIC | Age: 77
End: 2017-07-12

## 2017-07-12 DIAGNOSIS — M47.817 LUMBOSACRAL SPONDYLOSIS WITHOUT MYELOPATHY: ICD-10-CM

## 2017-07-12 DIAGNOSIS — M51.36 DDD (DEGENERATIVE DISC DISEASE), LUMBAR: ICD-10-CM

## 2017-07-12 RX ORDER — HYDROCODONE BITARTRATE AND ACETAMINOPHEN 5; 325 MG/1; MG/1
1 TABLET ORAL EVERY 8 HOURS PRN
Qty: 90 TABLET | Refills: 0 | Status: SHIPPED | OUTPATIENT
Start: 2017-07-12 | End: 2018-05-23

## 2017-08-01 ENCOUNTER — DOCUMENTATION ONLY (OUTPATIENT)
Dept: FAMILY MEDICINE | Facility: CLINIC | Age: 77
End: 2017-08-01

## 2017-08-01 NOTE — PROGRESS NOTES
Pre-Visit Chart Review  For Appointment Scheduled on 08/02/17    Health Maintenance Due   Topic Date Due    Foot Exam  03/02/2017    Influenza Vaccine  08/01/2017    Eye Exam  08/10/2017

## 2017-08-02 ENCOUNTER — HOSPITAL ENCOUNTER (OUTPATIENT)
Dept: RADIOLOGY | Facility: CLINIC | Age: 77
Discharge: HOME | End: 2017-08-02
Attending: FAMILY MEDICINE
Payer: MEDICARE

## 2017-08-02 ENCOUNTER — TELEPHONE (OUTPATIENT)
Dept: NEUROLOGY | Facility: CLINIC | Age: 77
End: 2017-08-02

## 2017-08-02 ENCOUNTER — TELEPHONE (OUTPATIENT)
Dept: FAMILY MEDICINE | Facility: CLINIC | Age: 77
End: 2017-08-02

## 2017-08-02 ENCOUNTER — OFFICE VISIT (OUTPATIENT)
Dept: FAMILY MEDICINE | Facility: CLINIC | Age: 77
End: 2017-08-02
Payer: MEDICARE

## 2017-08-02 VITALS
HEIGHT: 65 IN | SYSTOLIC BLOOD PRESSURE: 108 MMHG | BODY MASS INDEX: 31.07 KG/M2 | TEMPERATURE: 98 F | WEIGHT: 186.5 LBS | DIASTOLIC BLOOD PRESSURE: 57 MMHG | HEART RATE: 57 BPM

## 2017-08-02 DIAGNOSIS — E66.9 OBESITY (BMI 30.0-34.9): ICD-10-CM

## 2017-08-02 DIAGNOSIS — J01.00 ACUTE MAXILLARY SINUSITIS, RECURRENCE NOT SPECIFIED: ICD-10-CM

## 2017-08-02 DIAGNOSIS — R41.0 CONFUSION: ICD-10-CM

## 2017-08-02 DIAGNOSIS — R53.83 FATIGUE, UNSPECIFIED TYPE: Primary | ICD-10-CM

## 2017-08-02 DIAGNOSIS — J01.00 ACUTE MAXILLARY SINUSITIS, RECURRENCE NOT SPECIFIED: Primary | ICD-10-CM

## 2017-08-02 DIAGNOSIS — E11.59 HYPERTENSION ASSOCIATED WITH DIABETES: ICD-10-CM

## 2017-08-02 DIAGNOSIS — I15.2 HYPERTENSION ASSOCIATED WITH DIABETES: ICD-10-CM

## 2017-08-02 DIAGNOSIS — G47.33 OSA (OBSTRUCTIVE SLEEP APNEA): ICD-10-CM

## 2017-08-02 DIAGNOSIS — E11.8 TYPE 2 DIABETES MELLITUS WITH COMPLICATION, WITHOUT LONG-TERM CURRENT USE OF INSULIN: ICD-10-CM

## 2017-08-02 DIAGNOSIS — F03.90 DEMENTIA WITHOUT BEHAVIORAL DISTURBANCE, UNSPECIFIED DEMENTIA TYPE: ICD-10-CM

## 2017-08-02 LAB
BILIRUB SERPL-MCNC: NEGATIVE MG/DL
BILIRUB UR QL STRIP: NEGATIVE
BLOOD URINE, POC: NEGATIVE
CLARITY UR REFRACT.AUTO: ABNORMAL
COLOR UR AUTO: YELLOW
COLOR, POC UA: YELLOW
GLUCOSE UR QL STRIP: NEGATIVE
GLUCOSE UR QL STRIP: NORMAL
HGB UR QL STRIP: NEGATIVE
KETONES UR QL STRIP: NEGATIVE
KETONES UR QL STRIP: NEGATIVE
LEUKOCYTE ESTERASE UR QL STRIP: NEGATIVE
LEUKOCYTE ESTERASE URINE, POC: NEGATIVE
MICROSCOPIC COMMENT: NORMAL
NITRITE UR QL STRIP: NEGATIVE
NITRITE, POC UA: NEGATIVE
PH UR STRIP: 7 [PH] (ref 5–8)
PH, POC UA: 7
PROT UR QL STRIP: NEGATIVE
PROTEIN, POC: NORMAL
SP GR UR STRIP: 1.01 (ref 1–1.03)
SPECIFIC GRAVITY, POC UA: 1
SQUAMOUS #/AREA URNS AUTO: 2 /HPF
URN SPEC COLLECT METH UR: ABNORMAL
UROBILINOGEN UR STRIP-ACNC: NEGATIVE EU/DL
UROBILINOGEN, POC UA: NORMAL
WBC #/AREA URNS AUTO: 0 /HPF (ref 0–5)

## 2017-08-02 PROCEDURE — 81002 URINALYSIS NONAUTO W/O SCOPE: CPT | Mod: S$GLB,,, | Performed by: PHYSICIAN ASSISTANT

## 2017-08-02 PROCEDURE — 71020 XR CHEST PA AND LATERAL: CPT | Mod: 26,,, | Performed by: RADIOLOGY

## 2017-08-02 PROCEDURE — 99214 OFFICE O/P EST MOD 30 MIN: CPT | Mod: 25,S$GLB,, | Performed by: PHYSICIAN ASSISTANT

## 2017-08-02 PROCEDURE — 1159F MED LIST DOCD IN RCRD: CPT | Mod: S$GLB,,, | Performed by: PHYSICIAN ASSISTANT

## 2017-08-02 PROCEDURE — 70210 X-RAY EXAM OF SINUSES: CPT | Mod: 26,,, | Performed by: RADIOLOGY

## 2017-08-02 PROCEDURE — 99999 PR PBB SHADOW E&M-EST. PATIENT-LVL IV: CPT | Mod: PBBFAC,,, | Performed by: PHYSICIAN ASSISTANT

## 2017-08-02 PROCEDURE — 71020 XR CHEST PA AND LATERAL: CPT | Mod: TC,PO

## 2017-08-02 PROCEDURE — 1126F AMNT PAIN NOTED NONE PRSNT: CPT | Mod: S$GLB,,, | Performed by: PHYSICIAN ASSISTANT

## 2017-08-02 PROCEDURE — 99499 UNLISTED E&M SERVICE: CPT | Mod: S$GLB,,, | Performed by: PHYSICIAN ASSISTANT

## 2017-08-02 RX ORDER — CEFDINIR 300 MG/1
300 CAPSULE ORAL 2 TIMES DAILY
Qty: 20 CAPSULE | Refills: 0 | Status: SHIPPED | OUTPATIENT
Start: 2017-08-02 | End: 2017-08-12

## 2017-08-02 RX ORDER — DONEPEZIL HYDROCHLORIDE 5 MG/1
TABLET, FILM COATED ORAL
COMMUNITY
Start: 2017-05-03 | End: 2017-08-02

## 2017-08-02 NOTE — TELEPHONE ENCOUNTER
Can we see if Ms. Orellana can look into co-pay/C-PAP supplies for this patient.     Her son reports that co-pay for CPAP machine went from 18$/month up to 80+$/month and cannot afford to use this anymore, but is complaining about excessive fatigue.

## 2017-08-02 NOTE — TELEPHONE ENCOUNTER
----- Message from EVE Tobar sent at 8/2/2017 11:50 AM CDT -----  Patient complaining about increased fatigue, confusion. Suggests starting around time she began aricept 5mg & has now increased to 10mg daily. Has been to ER twice with similar symptoms without other cause found.    Is there something that you would recommend switching her to or should she be seen sooner than scheduled apt.    Thanks for your help  Marla Betancourt PA-C

## 2017-08-02 NOTE — PROGRESS NOTES
Subjective:       Patient ID: Jerica Rios is a 77 y.o. female.    Chief Complaint: Fatigue    HPI   Patient is a 77 year old female with DM II, HTN, HLD, Depression, DDD lumbar, DEVONTE, GERD, Dementia among others presenting to the clinic with several month concern of increased fatigue, lethargy, weakness, confusion. She reports to clinic with her son whom shares same concern & provides HPI. She has been to the ER twice in the past few months for similar concern. There was mention that possible symptoms started after beginning aricept 5mg daily for which she is now weaned up to 10mg daily. They have not reached out to neurology for this yet.     There is also complaint of sinus congestion, frontal headaches. CT head performed in ER 6/2017 showed mild sphenoid sinusitis. She denies any fever, but has had mild cough. No wheezing, no shortness of breath, no chest pain, dizziness.     Of  Note, patient does have DEVONTE for which she is not using her CPAP anymore because she could not afford the rental fee that [employer] was charging her for supplies.   Review of Systems   Constitutional: Positive for fatigue. Negative for activity change, chills, diaphoresis and unexpected weight change.   HENT: Positive for congestion, rhinorrhea and sinus pressure. Negative for hearing loss and trouble swallowing.    Eyes: Negative for discharge and visual disturbance.   Respiratory: Negative for chest tightness and wheezing.    Cardiovascular: Negative for chest pain and palpitations.   Gastrointestinal: Negative for blood in stool, constipation, diarrhea and vomiting.   Endocrine: Positive for polyuria. Negative for polydipsia.   Genitourinary: Negative for difficulty urinating, dysuria, hematuria and menstrual problem.   Musculoskeletal: Positive for arthralgias and neck pain. Negative for joint swelling.   Neurological: Positive for weakness and headaches.   Psychiatric/Behavioral: Positive for confusion and dysphoric mood.        Objective:      Physical Exam   Constitutional: Vital signs are normal. She appears well-developed and well-nourished. She appears lethargic. No distress.   HENT:   Head: Normocephalic and atraumatic.   Right Ear: Hearing, tympanic membrane, external ear and ear canal normal.   Left Ear: Hearing, tympanic membrane, external ear and ear canal normal.   Nose: Mucosal edema present. Right sinus exhibits maxillary sinus tenderness and frontal sinus tenderness. Left sinus exhibits maxillary sinus tenderness and frontal sinus tenderness.   Mouth/Throat: Uvula is midline and oropharynx is clear and moist.   Cardiovascular: Regular rhythm, S1 normal, S2 normal and normal heart sounds.  Bradycardia present.  Exam reveals no gallop.    No murmur heard.  Pulses:       Radial pulses are 2+ on the right side, and 2+ on the left side.   <2sec cap refill fingers bilat     Pulmonary/Chest: Effort normal and breath sounds normal. No respiratory distress. She has no decreased breath sounds. She has no wheezes. She has no rhonchi.   Abdominal: Soft. Normal appearance.   Neurological: She appears lethargic.   Skin: Skin is warm and dry. She is not diaphoretic.   Appropriate skin turgor   Psychiatric: She has a normal mood and affect. Her speech is normal. Judgment and thought content normal. She is withdrawn. She exhibits abnormal recent memory and abnormal remote memory.       Assessment:       1. Fatigue, unspecified type    2. Confusion    3. Type 2 diabetes mellitus with complication, without long-term current use of insulin    4. Hypertension associated with diabetes    5. Acute maxillary sinusitis, recurrence not specified    6. DEVONTE (obstructive sleep apnea)    7. Dementia without behavioral disturbance, unspecified dementia type    8. Obesity (BMI 30.0-34.9)        Plan:   Jerica was seen today for fatigue.    Diagnoses and all orders for this visit:    Fatigue, unspecified type  -     POCT URINE DIPSTICK WITHOUT  MICROSCOPE  -     Urinalysis  -     Urine culture  -     CBC auto differential; Future  -     Basic metabolic panel; Future  -     TSH; Future  -     Vitamin D; Future  -     Iron and TIBC; Future  -     Ferritin; Future    Confusion  -     POCT URINE DIPSTICK WITHOUT MICROSCOPE  -     Urinalysis  -     Urine culture  -     CBC auto differential; Future  -     Basic metabolic panel; Future  -     TSH; Future    Type 2 diabetes mellitus with complication, without long-term current use of insulin  Well controlled; continue current regimen    Hypertension associated with diabetes  Well controlled; continue current medications    Acute maxillary sinusitis, recurrence not specified  -     X-Ray Chest PA And Lateral; Future  -     X-Ray Sinuses Ltd Less Than 3 Views; Future    DEVONTE (obstructive sleep apnea)  Reports couldn't afford co-pay for CPAP machine  Will send message to people[employer] representative here      Dementia without behavioral disturbance, unspecified dementia type  Taking Aricept 10mg daily- will send message to neurology regarding increased confusion & fatigue.      Obesity (BMI 30.0-34.9)  Patient readiness: acceptance and barriers:none    During the course of the visit the patient was educated and counseled about the following:     Diabetes:  Discussed general issues about diabetes pathophysiology and management.  Hypertension:   Medication: no change.  Obesity:   Regular aerobic exercise program discussed.    Goals: Diabetes: Maintain Hemoglobin A1C below 7, Hypertension: Reduce Blood Pressure and Obesity: Reduce calorie intake and BMI    Did patient meet goals/outcomes: No    The following self management tools provided: declined    Patient Instructions (the written plan) was given to the patient/family.     Time spent with patient: 45 minutes

## 2017-08-02 NOTE — TELEPHONE ENCOUNTER
Thanks Marla for reaching out to me. I have contacted her son and asked that he stop donepezil for the time being to see if it is or is not the culprit. He will message me next week with update. I see you have also done a lab investigation.   Thanks again!  Lesly

## 2017-08-02 NOTE — TELEPHONE ENCOUNTER
Called patient to check on her. Left voicemail to return call. Please advise this message is from a EVE Weber from [facility].

## 2017-08-02 NOTE — TELEPHONE ENCOUNTER
----- Message from Fernandez Banks sent at 8/2/2017  4:27 PM CDT -----  Contact: Son - Matthew Rios  States that he is returning the call and to please call him back at [phone].  Thank you

## 2017-08-03 ENCOUNTER — TELEPHONE (OUTPATIENT)
Dept: FAMILY MEDICINE | Facility: CLINIC | Age: 77
End: 2017-08-03

## 2017-08-03 LAB
BACTERIA UR CULT: NORMAL
BACTERIA UR CULT: NORMAL

## 2017-08-03 NOTE — TELEPHONE ENCOUNTER
----- Message from Deonte Cervantes sent at 8/3/2017 11:16 AM CDT -----  Contact: pt's son Matthew  Pt's son is requesting a callback, have some questions about pt's medication   Call Back#[phone] or   Thanks

## 2017-08-03 NOTE — TELEPHONE ENCOUNTER
Patient seen anupam  Patient son called,states he read the side effects to ferrous sulfate online and he was concerned about supplement causing heart issues states patient has heart problems and does not want to cause more problems,notified patient son ferrous sulfate main side effects from ferrous sulfate are GI issue like constipation,dark stool. Would like to know if he can give her a low dosage of 65mg. If patient needs to be on 325mg then is requesting rx be sent to pharmacy states he can not find supplement otc.

## 2017-08-04 NOTE — TELEPHONE ENCOUNTER
I am not aware of any heart concerns so I don't know where he is getting that information.    Also they need to read the labels on any iron tablet bottles.  325 mg of ferrous sulfate contains 65 mg of actual elemental iron.  That is the correct dose. It does not need to be prescribed.

## 2017-08-07 RX ORDER — POTASSIUM CHLORIDE 750 MG/1
10 CAPSULE, EXTENDED RELEASE ORAL DAILY
Qty: 90 CAPSULE | Refills: 3 | Status: SHIPPED | OUTPATIENT
Start: 2017-08-07 | End: 2018-08-11 | Stop reason: SDUPTHER

## 2017-08-11 ENCOUNTER — PATIENT MESSAGE (OUTPATIENT)
Dept: NEUROLOGY | Facility: CLINIC | Age: 77
End: 2017-08-11

## 2017-08-15 ENCOUNTER — DOCUMENTATION ONLY (OUTPATIENT)
Dept: FAMILY MEDICINE | Facility: CLINIC | Age: 77
End: 2017-08-15

## 2017-08-15 NOTE — PROGRESS NOTES
Pre-Visit Chart Review  For Appointment Scheduled on 08/16/17    Health Maintenance Due   Topic Date Due    Foot Exam  03/02/2017    Influenza Vaccine  08/01/2017    Eye Exam  08/10/2017

## 2017-08-16 ENCOUNTER — TELEPHONE (OUTPATIENT)
Dept: FAMILY MEDICINE | Facility: CLINIC | Age: 77
End: 2017-08-16

## 2017-08-16 ENCOUNTER — OFFICE VISIT (OUTPATIENT)
Dept: FAMILY MEDICINE | Facility: CLINIC | Age: 77
End: 2017-08-16
Payer: MEDICARE

## 2017-08-16 VITALS
TEMPERATURE: 99 F | SYSTOLIC BLOOD PRESSURE: 111 MMHG | HEART RATE: 56 BPM | HEIGHT: 65 IN | BODY MASS INDEX: 31.11 KG/M2 | WEIGHT: 186.75 LBS | DIASTOLIC BLOOD PRESSURE: 66 MMHG

## 2017-08-16 DIAGNOSIS — E11.8 CONTROLLED TYPE 2 DIABETES MELLITUS WITH COMPLICATION, WITHOUT LONG-TERM CURRENT USE OF INSULIN: ICD-10-CM

## 2017-08-16 DIAGNOSIS — F03.A0 MILD DEMENTIA: ICD-10-CM

## 2017-08-16 DIAGNOSIS — J30.9 ALLERGIC RHINITIS, UNSPECIFIED CHRONICITY, UNSPECIFIED SEASONALITY, UNSPECIFIED TRIGGER: Primary | ICD-10-CM

## 2017-08-16 DIAGNOSIS — I15.2 HYPERTENSION ASSOCIATED WITH DIABETES: ICD-10-CM

## 2017-08-16 DIAGNOSIS — E66.9 OBESITY, CLASS I, BMI 30-34.9: ICD-10-CM

## 2017-08-16 DIAGNOSIS — E11.59 HYPERTENSION ASSOCIATED WITH DIABETES: ICD-10-CM

## 2017-08-16 PROCEDURE — 3074F SYST BP LT 130 MM HG: CPT | Mod: S$GLB,,, | Performed by: PHYSICIAN ASSISTANT

## 2017-08-16 PROCEDURE — 99499 UNLISTED E&M SERVICE: CPT | Mod: S$GLB,,, | Performed by: PHYSICIAN ASSISTANT

## 2017-08-16 PROCEDURE — 99999 PR PBB SHADOW E&M-EST. PATIENT-LVL III: CPT | Mod: PBBFAC,,, | Performed by: PHYSICIAN ASSISTANT

## 2017-08-16 PROCEDURE — 1126F AMNT PAIN NOTED NONE PRSNT: CPT | Mod: S$GLB,,, | Performed by: PHYSICIAN ASSISTANT

## 2017-08-16 PROCEDURE — 3078F DIAST BP <80 MM HG: CPT | Mod: S$GLB,,, | Performed by: PHYSICIAN ASSISTANT

## 2017-08-16 PROCEDURE — 1159F MED LIST DOCD IN RCRD: CPT | Mod: S$GLB,,, | Performed by: PHYSICIAN ASSISTANT

## 2017-08-16 PROCEDURE — 99214 OFFICE O/P EST MOD 30 MIN: CPT | Mod: S$GLB,,, | Performed by: PHYSICIAN ASSISTANT

## 2017-08-16 PROCEDURE — 3008F BODY MASS INDEX DOCD: CPT | Mod: S$GLB,,, | Performed by: PHYSICIAN ASSISTANT

## 2017-08-16 RX ORDER — AZELASTINE HYDROCHLORIDE, FLUTICASONE PROPIONATE 137; 50 UG/1; UG/1
1 SPRAY, METERED NASAL 2 TIMES DAILY
Qty: 23 G | Refills: 5 | Status: SHIPPED | OUTPATIENT
Start: 2017-08-16 | End: 2017-09-13

## 2017-08-16 NOTE — TELEPHONE ENCOUNTER
Patient seen in office today. Malaise improving since stopping Aricept. Son wondering if there is a different medication she could try for her memory loss.     Thanks for your help.

## 2017-08-16 NOTE — PROGRESS NOTES
Subjective:       Patient ID: Jerica Rios is a 77 y.o. female.    Chief Complaint: Follow-up    HPI   Patient is a 77 year old AA female with CAD, DEVONTE, DM II, HTN, DDD lumbar, Hypothyroidism, Dementia presenting to the clinic for 2 week f/u for lethargy, confusion. Patient has since undergone lab work-up which showed low iron level, but otherwise normal tsh, blood counts, kidney function, urine sample, cxr, sinus xray. Neurology had recommended stopping the aricept since it seemed like symptoms started around the time this was started. Patient's son reports she is doing better. However, he is concerned that there is still loss of short term memory and wondering if there is another medication besides aricept that patient could try. They have sent a message to Ms. Juliann NP but have not gotten a response yet. She did complete cefdinir rx for suspected sinusitis. She does still complain of some sinus congestion & runny nose. She is not taking anything else for this.   Review of Systems   Constitutional: Negative for activity change and unexpected weight change.   HENT: Positive for rhinorrhea. Negative for hearing loss and trouble swallowing.    Eyes: Negative for discharge and visual disturbance.   Respiratory: Negative for chest tightness and wheezing.    Cardiovascular: Negative for chest pain and palpitations.   Gastrointestinal: Negative for blood in stool, constipation, diarrhea and vomiting.   Endocrine: Positive for polyuria. Negative for polydipsia.   Genitourinary: Negative for difficulty urinating, dysuria, hematuria and menstrual problem.   Musculoskeletal: Positive for arthralgias and neck pain. Negative for joint swelling.   Neurological: Positive for weakness. Negative for headaches.   Psychiatric/Behavioral: Positive for confusion and dysphoric mood.       Objective:      Physical Exam   Constitutional: Vital signs are normal. She appears well-developed and well-nourished. No distress.   HENT:    Head: Normocephalic and atraumatic.   Right Ear: Hearing, tympanic membrane, external ear and ear canal normal.   Left Ear: Hearing, tympanic membrane, external ear and ear canal normal.   Nose: Rhinorrhea present. Right sinus exhibits no maxillary sinus tenderness and no frontal sinus tenderness. Left sinus exhibits no maxillary sinus tenderness and no frontal sinus tenderness.   Mouth/Throat: Uvula is midline, oropharynx is clear and moist and mucous membranes are normal.   Cardiovascular: Normal rate, regular rhythm, S1 normal, S2 normal and normal heart sounds.  Exam reveals no gallop.    No murmur heard.  Pulses:       Radial pulses are 2+ on the right side, and 2+ on the left side.        Dorsalis pedis pulses are 2+ on the right side, and 2+ on the left side.        Posterior tibial pulses are 2+ on the right side, and 2+ on the left side.   <2sec cap refill fingers bilat     Pulmonary/Chest: Effort normal and breath sounds normal. No respiratory distress. She has no wheezes. She has no rhonchi.   Abdominal: Soft. Normal appearance and bowel sounds are normal. There is no tenderness.   Feet:   Right Foot:   Protective Sensation: 7 sites tested. 7 sites sensed.   Skin Integrity: Negative for ulcer, blister, skin breakdown, erythema, warmth, callus or dry skin.   Left Foot:   Protective Sensation: 7 sites tested. 7 sites sensed.   Skin Integrity: Negative for ulcer, blister, skin breakdown, erythema, warmth, callus or dry skin.   Skin: Skin is warm and dry. She is not diaphoretic.   No lower leg edema   Psychiatric: She has a normal mood and affect. Her speech is normal and behavior is normal. Judgment and thought content normal. Cognition and memory are normal.       Assessment:       1. Allergic rhinitis, unspecified chronicity, unspecified seasonality, unspecified trigger    2. Controlled type 2 diabetes mellitus with complication, without long-term current use of insulin    3. Obesity, Class I, BMI  30-34.9    4. Hypertension associated with diabetes    5. Mild dementia        Plan:   Jerica was seen today for follow-up.    Diagnoses and all orders for this visit:    Allergic rhinitis, unspecified chronicity, unspecified seasonality, unspecified trigger  -     azelastine-fluticasone 137-50 mcg/spray Spry nassal spray; 1 spray by Each Nare route 2 (two) times daily.    Controlled type 2 diabetes mellitus with complication, without long-term current use of insulin  Well controlled; continue current medications  Foot exam complete today; reminded patient & son about eye exam  Labs due next month:  -     Basic metabolic panel; Future  -     Hemoglobin A1c; Future    Obesity, Class I, BMI 30-34.9    Hypertension associated with diabetes  Well controlled   Continue current bp medications    Mild dementia  Sent message to Ms. Humphreys regarding possibly starting new medication for memory loss.    Patient readiness: acceptance and barriers:none    During the course of the visit the patient was educated and counseled about the following:     Diabetes:  Discussed general issues about diabetes pathophysiology and management.  Discussed foot care.  Hypertension:   Medication: no change.  Obesity:   Regular aerobic exercise program discussed.    Goals: Diabetes: Maintain Hemoglobin A1C below 7, Hypertension: Reduce Blood Pressure and Obesity: Reduce calorie intake and BMI    Did patient meet goals/outcomes: No    The following self management tools provided: declined    Patient Instructions (the written plan) was given to the patient/family.     Time spent with patient: 30 minutes

## 2017-08-23 RX ORDER — BACLOFEN 10 MG/1
TABLET ORAL
Qty: 90 TABLET | Refills: 0 | Status: SHIPPED | OUTPATIENT
Start: 2017-08-23 | End: 2017-11-29 | Stop reason: SDUPTHER

## 2017-08-30 RX ORDER — CLONAZEPAM 0.5 MG/1
TABLET ORAL
Qty: 30 TABLET | Refills: 0 | Status: SHIPPED | OUTPATIENT
Start: 2017-08-30 | End: 2017-11-16 | Stop reason: SDUPTHER

## 2017-08-31 RX ORDER — CLONAZEPAM 0.5 MG/1
TABLET ORAL
Qty: 30 TABLET | Refills: 0 | OUTPATIENT
Start: 2017-08-31

## 2017-09-13 ENCOUNTER — OFFICE VISIT (OUTPATIENT)
Dept: CARDIOLOGY | Facility: CLINIC | Age: 77
End: 2017-09-13
Payer: MEDICARE

## 2017-09-13 ENCOUNTER — OFFICE VISIT (OUTPATIENT)
Dept: NEUROLOGY | Facility: CLINIC | Age: 77
End: 2017-09-13
Payer: COMMERCIAL

## 2017-09-13 VITALS
HEART RATE: 76 BPM | SYSTOLIC BLOOD PRESSURE: 115 MMHG | HEIGHT: 65 IN | OXYGEN SATURATION: 96 % | WEIGHT: 188.69 LBS | BODY MASS INDEX: 31.44 KG/M2 | DIASTOLIC BLOOD PRESSURE: 56 MMHG

## 2017-09-13 VITALS
BODY MASS INDEX: 31.48 KG/M2 | WEIGHT: 188.94 LBS | HEIGHT: 65 IN | DIASTOLIC BLOOD PRESSURE: 69 MMHG | SYSTOLIC BLOOD PRESSURE: 124 MMHG | RESPIRATION RATE: 20 BRPM | HEART RATE: 60 BPM

## 2017-09-13 DIAGNOSIS — G47.33 OSA (OBSTRUCTIVE SLEEP APNEA): ICD-10-CM

## 2017-09-13 DIAGNOSIS — F41.8 DEPRESSION WITH ANXIETY: ICD-10-CM

## 2017-09-13 DIAGNOSIS — Z91.89 SEDENTARY LIFESTYLE: ICD-10-CM

## 2017-09-13 DIAGNOSIS — R53.82 CHRONIC FATIGUE: ICD-10-CM

## 2017-09-13 DIAGNOSIS — F03.A0 MILD DEMENTIA: Primary | ICD-10-CM

## 2017-09-13 DIAGNOSIS — R06.09 DOE (DYSPNEA ON EXERTION): ICD-10-CM

## 2017-09-13 DIAGNOSIS — Z95.5 S/P CORONARY ARTERY STENT PLACEMENT: Primary | ICD-10-CM

## 2017-09-13 DIAGNOSIS — I51.89 DIASTOLIC DYSFUNCTION: ICD-10-CM

## 2017-09-13 DIAGNOSIS — E78.5 HYPERLIPIDEMIA WITH TARGET LDL LESS THAN 70: Chronic | ICD-10-CM

## 2017-09-13 DIAGNOSIS — E61.1 IRON DEFICIENCY: ICD-10-CM

## 2017-09-13 DIAGNOSIS — G47.10 EXCESSIVE SLEEPINESS: ICD-10-CM

## 2017-09-13 DIAGNOSIS — E66.9 OBESITY, CLASS I, BMI 30-34.9: ICD-10-CM

## 2017-09-13 PROCEDURE — 1159F MED LIST DOCD IN RCRD: CPT | Mod: S$GLB,,, | Performed by: NURSE PRACTITIONER

## 2017-09-13 PROCEDURE — 99999 PR PBB SHADOW E&M-EST. PATIENT-LVL III: CPT | Mod: PBBFAC,,, | Performed by: NURSE PRACTITIONER

## 2017-09-13 PROCEDURE — 99215 OFFICE O/P EST HI 40 MIN: CPT | Mod: S$GLB,,, | Performed by: INTERNAL MEDICINE

## 2017-09-13 PROCEDURE — 3078F DIAST BP <80 MM HG: CPT | Mod: S$GLB,,, | Performed by: INTERNAL MEDICINE

## 2017-09-13 PROCEDURE — 99215 OFFICE O/P EST HI 40 MIN: CPT | Mod: S$GLB,,, | Performed by: NURSE PRACTITIONER

## 2017-09-13 PROCEDURE — 99499 UNLISTED E&M SERVICE: CPT | Mod: S$GLB,,, | Performed by: INTERNAL MEDICINE

## 2017-09-13 PROCEDURE — 1126F AMNT PAIN NOTED NONE PRSNT: CPT | Mod: S$GLB,,, | Performed by: NURSE PRACTITIONER

## 2017-09-13 PROCEDURE — 99999 PR PBB SHADOW E&M-EST. PATIENT-LVL III: CPT | Mod: PBBFAC,,, | Performed by: INTERNAL MEDICINE

## 2017-09-13 PROCEDURE — 3074F SYST BP LT 130 MM HG: CPT | Mod: S$GLB,,, | Performed by: INTERNAL MEDICINE

## 2017-09-13 PROCEDURE — 99499 UNLISTED E&M SERVICE: CPT | Mod: S$GLB,,, | Performed by: NURSE PRACTITIONER

## 2017-09-13 PROCEDURE — 3008F BODY MASS INDEX DOCD: CPT | Mod: S$GLB,,, | Performed by: NURSE PRACTITIONER

## 2017-09-13 PROCEDURE — 1159F MED LIST DOCD IN RCRD: CPT | Mod: S$GLB,,, | Performed by: INTERNAL MEDICINE

## 2017-09-13 PROCEDURE — 1126F AMNT PAIN NOTED NONE PRSNT: CPT | Mod: S$GLB,,, | Performed by: INTERNAL MEDICINE

## 2017-09-13 PROCEDURE — 3008F BODY MASS INDEX DOCD: CPT | Mod: S$GLB,,, | Performed by: INTERNAL MEDICINE

## 2017-09-13 PROCEDURE — 3074F SYST BP LT 130 MM HG: CPT | Mod: S$GLB,,, | Performed by: NURSE PRACTITIONER

## 2017-09-13 PROCEDURE — 3078F DIAST BP <80 MM HG: CPT | Mod: S$GLB,,, | Performed by: NURSE PRACTITIONER

## 2017-09-13 RX ORDER — FLUTICASONE PROPIONATE 50 MCG
2 SPRAY, SUSPENSION (ML) NASAL 2 TIMES DAILY
COMMUNITY
End: 2017-11-08

## 2017-09-13 RX ORDER — MEMANTINE HYDROCHLORIDE 5 MG/1
TABLET ORAL
Qty: 60 TABLET | Refills: 11 | Status: SHIPPED | OUTPATIENT
Start: 2017-09-13 | End: 2018-01-18 | Stop reason: SDUPTHER

## 2017-09-13 NOTE — PATIENT INSTRUCTIONS
Stay off donepezil.      Begin menantine (Namenda) 5 mg as follows....    Week 1- take one tablet in morning    Week 2 and after- take one tablet morning and evening    Call / email for problems or questions.    Follow up 4-6 months.

## 2017-09-13 NOTE — PROGRESS NOTES
"Name: Jerica Rios  MRN: [id number]   CSN: [id number]      Date: 9-13-17      Referring physician:  Lesly Humphreys, NP  [id number] [place] [address]    Subjective:      Chief Complaint: memory    History of Present Illness (HPI):    Jerica Rios is a 77 y.o. right-handed female who presents today for a follow-up evaluation of mild dementia  and longstanding depression/anxiety (managed by PCP), accompanied by son.     Last seen in office in May 3. At that time, she was started on donepezil 5 mg with titration to 10 mg daily. Chart review reveals ED visit 5-12 for fatigue and then another ED visit 6-21 for near syncope.    My office was notified in early August that she had been experiencing fatigue and confusion (to the point she did not know where her bedroom was) that started around the time she began Aricept. Called and requested that she stop Aricept. Son emailed update stating fatigue was indeed improving. Son states the fatigue to the point she was dragging is better. She still has some fatigue but not nearly as severe.    He says she has billy of "brilliance" and will bring up a conversation that they discussed two days ago. He says then, she will forget and it is like a roller coaster ride.     Frustrates at times when she cannot get her words out correctly. Never lashes out.   Due an eye appt. Notes vision will get a little fuzzy when she works puzzles.     Few falls since last visit.Attributes to arthritic knees and giving out on her. Does use walker. Sometimes son will have to remind her to use. She offers that if she makes a fast turn, she may fall.     Feeds independently. Does not look to cook anymore. If she desires to cook, she will only cook with son present.   Does not drive.   Dresses independently. Does not have specific preferences on items of clothing or colors.   Some trouble getting in/out of tub. Has transfer bench.  Son manages finances.       Review of Systems "   Constitutional: Positive for fatigue.   HENT: Positive for postnasal drip. Negative for trouble swallowing.    Eyes: Positive for visual disturbance.   Respiratory: Negative.  Negative for shortness of breath and stridor.    Cardiovascular: Negative for leg swelling.   Gastrointestinal: Negative.  Negative for constipation and nausea.   Genitourinary: Negative for frequency and urgency.        Some leakage at times   Musculoskeletal: Positive for arthralgias, back pain and gait problem.   Skin: Negative for rash.   Neurological: Positive for dizziness and light-headedness. Negative for tremors, syncope and speech difficulty.   Hematological: Does not bruise/bleed easily.   Psychiatric/Behavioral: Positive for sleep disturbance. Negative for agitation, confusion, dysphoric mood and hallucinations. The patient is not nervous/anxious.        Past Medical History: The patient  has a past medical history of Allergy; Arthritis; Carotid artery disease; Coronary artery disease; Diabetes mellitus; Diabetes mellitus, type 2; Fall at home; GERD (gastroesophageal reflux disease); Headache(784.0); Hyperlipidemia; Hypertension; Mental disorder; Obesity; Right rib fracture; Snoring; and Toe fracture, right.    Social History: The patient  reports that she has never smoked. She has never used smokeless tobacco. She reports that she does not drink alcohol or use drugs.    Family History: Their family history includes Cancer in her mother; Dementia in her brother; Diabetes in her mother; Heart disease in her brother, father, mother, and sister; Stroke in her brother.    Allergies: Amoxicillin-pot clavulanate; Meperidine; Propoxyphene n-acetaminophen; and Sulfa (sulfonamide antibiotics)     Meds:   Current Outpatient Prescriptions on File Prior to Visit   Medication Sig Dispense Refill    amlodipine (NORVASC) 10 MG tablet Take 1 tablet (10 mg total) by mouth once daily. 90 tablet 3    atorvastatin (LIPITOR) 40 MG tablet Take 1  tablet (40 mg total) by mouth once daily. 90 tablet 3    baclofen (LIORESAL) 10 MG tablet START WITH 1/2 TABLET EVERY EVENING X 1 WEEK THEN INCREASE TO 1 TABLET EVERY EVENING 90 tablet 0    clonazePAM (KLONOPIN) 0.5 MG tablet TAKE 1 TABLET BY MOUTH 1 TO 2 TIMES A DAY AS NEEDED 30 tablet 0    clopidogrel (PLAVIX) 75 mg tablet TAKE 1 TABLET BY MOUTH EVERY DAY -MUST FOLLOW UP WITH CARDIOLOGIST 30 tablet 11    esomeprazole (NEXIUM) 20 MG capsule Take 20 mg by mouth once daily. Patient takes OTC nexium      hydrochlorothiazide (HYDRODIURIL) 25 MG tablet Take 25 mg by mouth once daily.  3    hydrocodone-acetaminophen 5-325mg (NORCO) 5-325 mg per tablet Take 1 tablet by mouth every 8 (eight) hours as needed for Pain. 90 tablet 0    irbesartan (AVAPRO) 300 MG tablet TAKE 1 TABLET BY MOUTH EVERY EVENING. 90 tablet 2    lancets 33 gauge Misc 1 lancet by Misc.(Non-Drug; Combo Route) route once daily. 100 each 3    levothyroxine (SYNTHROID) 50 MCG tablet TAKE 1 TABLET BY MOUTH EVERY DAY IN THE MORNING ON EMPTY STOMACH 90 tablet 3    magnesium oxide (MAG-OXIDE) 400 mg tablet Take 1 tablet (400 mg total) by mouth once daily. Every day 90 tablet 3    melatonin 3 mg Tab Take 3 mg by mouth nightly.      metformin (GLUCOPHAGE) 500 MG tablet TAKE 1 TABLET (500 MG TOTAL) BY MOUTH 2 (TWO) TIMES DAILY WITH MEALS. 180 tablet 3    metoprolol tartrate (LOPRESSOR) 25 MG tablet Take 1 tablet (25 mg total) by mouth 2 (two) times daily. 180 tablet 3    MULTIVITAMIN ORAL Take 1 Package by mouth once daily. Pt takes Diabetic Vitamin Pack      nitroGLYCERIN (NITROSTAT) 0.4 MG SL tablet Place 1 tablet (0.4 mg total) under the tongue every 5 (five) minutes as needed for Chest pain. As directed  tablet 3    potassium chloride (MICRO-K) 10 MEQ CpSR Take 1 capsule (10 mEq total) by mouth once daily. 90 capsule 3    sertraline (ZOLOFT) 50 MG tablet TAKE 1 TABLET BY MOUTH EVERY DAY 30 tablet 6    vitamin D 1000 units Tab Take  "1,000 Units by mouth once daily.       [DISCONTINUED] azelastine-fluticasone 137-50 mcg/spray Spry nassal spray 1 spray by Each Nare route 2 (two) times daily. 23 g 5    [DISCONTINUED] ranitidine (ZANTAC) 150 MG tablet Take 1 tablet (150 mg total) by mouth 2 (two) times daily. 60 tablet 11     No current facility-administered medications on file prior to visit.        Objective:     Physical Exam:    Vitals:    09/13/17 0915   BP: 124/69   BP Location: Left arm   Patient Position: Sitting   BP Method: Medium (Automatic)   Pulse: 60   Resp: 20   Weight: 85.7 kg (188 lb 15 oz)   Height: 5' 5" (1.651 m)     Body mass index is 31.44 kg/m².    Constitutional  Well-developed, well-nourished, appears stated age   Cardiovascular  Radial pulses 2+ and symmetric, no LE edema bilaterally     ..  Neurological    * Mental status  MOCA =  13 +1=   See form    Oriented to: person, place, situation, day of week, month of year and year  Not oriented to: city ("[place]")     - Memory   - Orientation  Immediate 3/5 and 5/5. Delayed 2/ without cue. With category cue recalls 0. With multiple choice recalls 2.      - Attention/concentration  Loses 5/6 points     - Language  Correctly identifies 2 of 3 animals.   Loses 2 of 2 points for sentence repetition.   With verbal fluency, names 10 "b" words in one minute (11 or > is normal).      - Executive  Loses 1 point for trail.  Cylinder intact.  Contour of clock correct but loses 1 point each for numbers and hands.      - Abstract  Loses 1 of points                   Laboratory Results:  Lab Visit on 08/02/2017   Component Date Value Ref Range Status    WBC 08/02/2017 10.74  3.90 - 12.70 K/uL Final    RBC 08/02/2017 4.70  4.00 - 5.40 M/uL Final    Hemoglobin 08/02/2017 12.4  12.0 - 16.0 g/dL Final    Hematocrit 08/02/2017 37.9  37.0 - 48.5 % Final    MCV 08/02/2017 81* 82 - 98 fL Final    MCH 08/02/2017 26.4* 27.0 - 31.0 pg Final    MCHC 08/02/2017 32.7  32.0 - 36.0 g/dL Final    " RDW 08/02/2017 15.3* 11.5 - 14.5 % Final    Platelets 08/02/2017 384* 150 - 350 K/uL Final    MPV 08/02/2017 10.6  9.2 - 12.9 fL Final    Gran # 08/02/2017 7.0  1.8 - 7.7 K/uL Final    Lymph # 08/02/2017 2.3  1.0 - 4.8 K/uL Final    Mono # 08/02/2017 1.1* 0.3 - 1.0 K/uL Final    Eos # 08/02/2017 0.2  0.0 - 0.5 K/uL Final    Baso # 08/02/2017 0.04  0.00 - 0.20 K/uL Final    Gran% 08/02/2017 65.5  38.0 - 73.0 % Final    Lymph% 08/02/2017 21.8  18.0 - 48.0 % Final    Mono% 08/02/2017 10.2  4.0 - 15.0 % Final    Eosinophil% 08/02/2017 1.8  0.0 - 8.0 % Final    Basophil% 08/02/2017 0.4  0.0 - 1.9 % Final    Differential Method 08/02/2017 Automated   Final    Sodium 08/02/2017 140  136 - 145 mmol/L Final    Potassium 08/02/2017 3.5  3.5 - 5.1 mmol/L Final    Chloride 08/02/2017 101  95 - 110 mmol/L Final    CO2 08/02/2017 28  23 - 29 mmol/L Final    Glucose 08/02/2017 96  70 - 110 mg/dL Final    BUN, Bld 08/02/2017 16  8 - 23 mg/dL Final    Creatinine 08/02/2017 1.0  0.5 - 1.4 mg/dL Final    Calcium 08/02/2017 9.8  8.7 - 10.5 mg/dL Final    Anion Gap 08/02/2017 11  8 - 16 mmol/L Final    eGFR if African American 08/02/2017 >60.0  >60 mL/min/1.73 m^2 Final    eGFR if non African American 08/02/2017 54.5* >60 mL/min/1.73 m^2 Final    TSH 08/02/2017 1.496  0.400 - 4.000 uIU/mL Final    Vit D, 25-Hydroxy 08/02/2017 58  30 - 96 ng/mL Final    Iron 08/02/2017 45  30 - 160 ug/dL Final    Transferrin 08/02/2017 282  200 - 375 mg/dL Final    TIBC 08/02/2017 417  250 - 450 ug/dL Final    Saturated Iron 08/02/2017 11* 20 - 50 % Final    Ferritin 08/02/2017 18* 20.0 - 300.0 ng/mL Final   Office Visit on 08/02/2017   Component Date Value Ref Range Status    Color, UA 08/02/2017 yellow   Final    Spec Grav UA 08/02/2017 1.005   Final    pH, UA 08/02/2017 7   Final    WBC, UA 08/02/2017 negative   Final    Nitrite, UA 08/02/2017 negative   Final    Protein 08/02/2017 trace   Final    Glucose, UA  08/02/2017 normal   Final    Ketones, UA 08/02/2017 negative   Final    Urobilinogen, UA 08/02/2017 normal   Final    Bilirubin 08/02/2017 negative   Final    Blood, UA 08/02/2017 negative   Final    Specimen UA 08/02/2017 Urine, Clean Catch   Final    Color, UA 08/02/2017 Yellow  Yellow, Straw, Gayle Final    Appearance, UA 08/02/2017 Hazy* Clear Final    pH, UA 08/02/2017 7.0  5.0 - 8.0 Final    Specific Gravity, UA 08/02/2017 1.010  1.005 - 1.030 Final    Protein, UA 08/02/2017 Negative  Negative Final    Glucose, UA 08/02/2017 Negative  Negative Final    Ketones, UA 08/02/2017 Negative  Negative Final    Bilirubin (UA) 08/02/2017 Negative  Negative Final    Occult Blood UA 08/02/2017 Negative  Negative Final    Nitrite, UA 08/02/2017 Negative  Negative Final    Urobilinogen, UA 08/02/2017 Negative  <2.0 EU/dL Final    Leukocytes, UA 08/02/2017 Negative  Negative Final    Urine Culture, Routine 08/03/2017 Multiple organisms isolated. None in predominance.  Repeat if   Final    Urine Culture, Routine 08/03/2017 clinically necessary.   Final    WBC, UA 08/02/2017 0  0 - 5 /hpf Final    Squam Epithel, UA 08/02/2017 2  /hpf Final    Microscopic Comment 08/02/2017 SEE COMMENT   Final   Admission on 06/21/2017, Discharged on 06/22/2017   Component Date Value Ref Range Status    BNP 06/21/2017 39  0 - 99 pg/mL Final    WBC 06/21/2017 11.60  3.90 - 12.70 K/uL Final    RBC 06/21/2017 4.77  4.00 - 5.40 M/uL Final    Hemoglobin 06/21/2017 12.5  12.0 - 16.0 g/dL Final    Hematocrit 06/21/2017 38.3  37.0 - 48.5 % Final    MCV 06/21/2017 80* 82 - 98 fL Final    MCH 06/21/2017 26.2* 27.0 - 31.0 pg Final    MCHC 06/21/2017 32.7  32.0 - 36.0 % Final    RDW 06/21/2017 15.4* 11.5 - 14.5 % Final    Platelets 06/21/2017 401* 150 - 350 K/uL Final    MPV 06/21/2017 8.3* 9.2 - 12.9 fL Final    Gran # 06/21/2017 8.7* 1.8 - 7.7 K/uL Final    Lymph # 06/21/2017 2.0  1.0 - 4.8 K/uL Final    Mono #  06/21/2017 0.6  0.3 - 1.0 K/uL Final    Eos # 06/21/2017 0.2  0.0 - 0.5 K/uL Final    Baso # 06/21/2017 0.10  0.00 - 0.20 K/uL Final    Gran% 06/21/2017 75.7* 38.0 - 73.0 % Final    Lymph% 06/21/2017 17.2* 18.0 - 48.0 % Final    Mono% 06/21/2017 4.8  4.0 - 15.0 % Final    Eosinophil% 06/21/2017 1.3  0.0 - 8.0 % Final    Basophil% 06/21/2017 1.0  0.0 - 1.9 % Final    Differential Method 06/21/2017 Automated   Final    Sodium 06/21/2017 138  136 - 145 mmol/L Final    Potassium 06/21/2017 3.7  3.5 - 5.1 mmol/L Final    Chloride 06/21/2017 100  95 - 110 mmol/L Final    CO2 06/21/2017 25  23 - 29 mmol/L Final    Glucose 06/21/2017 135* 70 - 110 mg/dL Final    BUN, Bld 06/21/2017 14  8 - 23 mg/dL Final    Creatinine 06/21/2017 1.0  0.5 - 1.4 mg/dL Final    Calcium 06/21/2017 10.2  8.7 - 10.5 mg/dL Final    Total Protein 06/21/2017 7.5  6.0 - 8.4 g/dL Final    Albumin 06/21/2017 4.3  3.5 - 5.2 g/dL Final    Total Bilirubin 06/21/2017 0.4  0.1 - 1.0 mg/dL Final    Alkaline Phosphatase 06/21/2017 148* 55 - 135 U/L Final    AST 06/21/2017 17  10 - 40 U/L Final    ALT 06/21/2017 21  10 - 44 U/L Final    Anion Gap 06/21/2017 13  8 - 16 mmol/L Final    eGFR if African American 06/21/2017 >60  >60 mL/min/1.73 m^2 Final    eGFR if non African American 06/21/2017 54* >60 mL/min/1.73 m^2 Final    Lactate (Lactic Acid) 06/21/2017 2.2  0.5 - 2.2 mmol/L Final    Specimen UA 06/21/2017 Urine, Clean Catch   Final    Color, UA 06/21/2017 Yellow  Yellow, Straw, Gayle Final    Appearance, UA 06/21/2017 Clear  Clear Final    pH, UA 06/21/2017 6.0  5.0 - 8.0 Final    Specific Gravity, UA 06/21/2017 1.010  1.005 - 1.030 Final    Protein, UA 06/21/2017 Negative  Negative Final    Glucose, UA 06/21/2017 Negative  Negative Final    Ketones, UA 06/21/2017 Negative  Negative Final    Bilirubin (UA) 06/21/2017 Negative  Negative Final    Occult Blood UA 06/21/2017 Negative  Negative Final    Nitrite, UA  06/21/2017 Negative  Negative Final    Urobilinogen, UA 06/21/2017 Negative  <2.0 EU/dL Final    Leukocytes, UA 06/21/2017 Negative  Negative Final    TSH 06/21/2017 0.948  0.400 - 4.000 uIU/mL Final    Troponin I 06/21/2017 <0.006  0.000 - 0.026 ng/mL Final       Imaging:   Independent reviewed from CT head 2-2016            Assessment and Plan     Mild dementia     -Intolerant to donepezil    -MOCA today-> 13+1=14   -11-13-16-> MOCA 13+1=14/30   -CT head from 2-2016 - ? Enlarged ventricles   -Some bladder leakage. Gait does not look consistent with NPH.    Medical Decision Making:    Stay off donepezil.   Begin memantine 5 mg daily for one week, then increase to 5 mg BID if they feel comfortable.   Discussed rationale and goals of memory meds. Understand improvement is not guarantee.  Discussed general safety aspects when memory is an issue.  Call / email for problems or questions.   Follow up 4-6 months on [place].   Can always see sooner if needed on [place].       I spent 55 minutes face-to-face with the patient and son with >50% of the time spent with counseling and education regarding:  - results of data, diagnosis, and recommendations stated above  - the prognosis of dementia  - risks and benefits of donepezil and memantine  - importance of diet and exercise    Lesly Humphreys, JONO, NP-C  Division of Movement and Memory Disorders  [facility]  [phone]

## 2017-09-13 NOTE — PROGRESS NOTES
Subjective:    Patient ID:  Jerica Rios is a 77 y.o. female who presents for evaluation of No chief complaint on file.  For CAD post stent in 2010, HTN, annual review  PCP: Dr. Ramírez  Orthopedic: Dr. Guevara  Prior cardiologist: Dr. Bone, last seen 6/2014  Pain: Dr. Cole  GI: Dr. 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    Mixed WF with history of CAD post multiple stents, patient think 5 stents, last in 2010. Denies any CP nor SOB over this past year. Sedentary due to limitation for OA of the neck and lower back. ECG low voltage otherwise normal. Recent labs LDL 63, non-HDL 76, 3/2016, normal A1C, TSH, and CMP. Last urine in 9/2015 negative for microalbuminuria.    Chart reviewed, last dobutamine stress echo in 2/2013 -   EKG Conclusions:    1. The EKG portion of this study is negative for ischemia at a peak heart rate of 151 bpm (107% of predicted).   2. Blood pressure remained stable throughout the protocol  (Presenting BP: 117/64 Peak BP: 145/59).   3. No significant arrhythmias were present.   4. There were no symptoms of chest discomfort or significant dyspnea throughout the protocol.   5. No evidence of stress induced myocardial ischemia.     In 7/2016, had a fall in the kitchen, tripped over a gel mat, resulted in torn tendon over the right shoulder, received one injection of muscle relaxant with some benefit. Noted just mild WELLS but mostly sedentary.   Lexiscan Nuclear Quantitative Functional Analysis:   LVEF: >= 70 % (normal is 55 - 69)  LVED Volume: 39 ml (normal is 60 - 98)  LVES Volume: 8 ml (normal is 20 - 42)    Impression: NORMAL MYOCARDIAL PERFUSION  1. The perfusion scan is free of evidence for myocardial ischemia or injury.   2. Resting wall motion is physiologic.   3. Resting LV function is normal.  (normal is 55 - 69)  4. The ventricular volumes are normal at rest and stress.   5. The  extracardiac distribution of radioactivity is normal.    ECHO CONCLUSIONS     1 - Eccentric hypertrophy.     2 - Normal left ventricular systolic function (EF 60-65%).     3 - Left ventricular diastolic dysfunction. E/e'(lat) is 15    4 - Right ventricular enlargement with normal systolic function.     5 - The estimated PA systolic pressure is 34 mmHg.     6 - Small pericardial effusion.     7 - Suggestion for diastolic dysfunciton, new from Echo in 2/2013    In 9/2017, tired all the time, have diagnosed DEVONTE but off CPAP for the last 2 years due to copay, also on BB. No CP. Some activities, doing recumbent biking 1-2 times weekly, about 5 minutes. Labs reviewed, 3/2017, LDL 66.    Review of Systems   Constitution: Positive for weakness, malaise/fatigue, night sweats and weight loss (loss 2 lbs since last visit). Negative for diaphoresis, fever and weight gain.   HENT: Positive for hoarse voice, sore throat and tinnitus. Negative for nosebleeds.    Eyes: Positive for blurred vision and visual halos. Negative for visual disturbance.   Cardiovascular: Negative for chest pain, claudication, cyanosis, dyspnea on exertion, irregular heartbeat, leg swelling, near-syncope, orthopnea, palpitations and paroxysmal nocturnal dyspnea.   Respiratory: Positive for cough and snoring. Negative for shortness of breath and wheezing.    Endocrine: Positive for cold intolerance and polyuria. Negative for polydipsia.   Hematologic/Lymphatic: Does not bruise/bleed easily.   Skin: Positive for dry skin. Negative for color change, flushing, nail changes, poor wound healing and suspicious lesions.   Musculoskeletal: Positive for arthritis, back pain, falls, joint pain, joint swelling, muscle weakness, neck pain and stiffness. Negative for gout, muscle cramps and myalgias.   Gastrointestinal: Positive for bloating, abdominal pain, constipation, excessive appetite, flatus and heartburn. Negative for hematemesis, hematochezia, melena and  "nausea.   Genitourinary: Positive for bladder incontinence, decreased libido, frequency, hematuria, hesitancy, incomplete emptying, nocturia and urgency.   Neurological: Positive for disturbances in coordination, dizziness, focal weakness, headaches, light-headedness and numbness. Negative for excessive daytime sleepiness, loss of balance and vertigo.   Psychiatric/Behavioral: Positive for depression and memory loss. Negative for substance abuse. The patient has insomnia and is nervous/anxious.    Allergic/Immunologic: Positive for environmental allergies.     Ragland score 2 up to 6     Objective:    Physical Exam   Constitutional: She is oriented to person, place, and time. She appears well-developed and well-nourished.   HENT:   Head: Normocephalic.   Eyes: Conjunctivae and EOM are normal. Pupils are equal, round, and reactive to light.   Neck: Normal range of motion. Neck supple. No JVD present. No thyromegaly present.   Circumference 15.75"   Cardiovascular: Normal rate, regular rhythm and intact distal pulses.  Exam reveals distant heart sounds. Exam reveals no gallop and no friction rub.    No murmur heard.  Pulses:       Posterior tibial pulses are 1+ on the right side, and 1+ on the left side.   Pulmonary/Chest: Effort normal and breath sounds normal. She has no rales. She exhibits no tenderness.   Abdominal: Soft. Bowel sounds are normal. There is no tenderness.   Waist 42.5" up to 45", hip 48"   Musculoskeletal: Normal range of motion. She exhibits no edema.   Lymphadenopathy:     She has no cervical adenopathy.   Neurological: She is alert and oriented to person, place, and time.   Skin: Skin is warm and dry. No rash noted.        Assessment:       1. S/P coronary artery stent placement, multiple, last in [facility] 2010    2. Sedentary lifestyle    3. DEVONTE (obstructive sleep apnea), not on Rx due to cost, off 2015, diagnosed in 4/2014    4. Obesity, Class I, BMI 30-34.9, today 31.4    5. Chronic fatigue    6. " Hyperlipidemia with target LDL less than 70    7. Excessive sleepiness, e ESS 13/24    8. Depression with anxiety    9. WELLS (dyspnea on exertion)    10. Diastolic dysfunction    11. Iron deficiency         Plan:       Jerica was seen today for consult.    Diagnoses and all orders for this visit:    S/P coronary artery stent placement, multiple, last in [facility] 2010  -     2D echo with color flow doppler; Future    Sedentary lifestyle    DEVONTE (obstructive sleep apnea), not on Rx due to cost, off 2015, diagnosed in 4/2014  -     Ambulatory consult to Pulmonology    Obesity, Class I, BMI 30-34.9, today 31.4    Chronic fatigue  -     2D echo with color flow doppler; Future    Hyperlipidemia with target LDL less than 70    Excessive sleepiness, e ESS 13/24  -     Ambulatory consult to Pulmonology    Depression with anxiety    WELLS (dyspnea on exertion)  -     2D echo with color flow doppler; Future    Diastolic dysfunction  -     2D echo with color flow doppler; Future    Iron deficiency      Abdominal obesity    Primary osteoarthritis involving multiple joints  - Advised use of Tylenol as first choice, need to take when pain just started.    Hypertension associated with diabetes    - CV status stable, continue current Rx, except will hold BB, all medications reviewed, patient acknowledge good understanding.  - Check home blood pressure, 2 days weekly, do 2 readings within 5 minutes in AM and PM, keep log for review.  - Instruction for Mediterranean diet and heart healthy exercise given.  - Highly recommend 30 minutes of exercise daily, can have Sunday off, with 2-3 sessions of muscle strengthening weekly. A  would be very helpful.  - Weigh twice weekly, try to lose 1-2 lbs per week  - Recommend at least annual cardiovascular evaluation in view of her significant risk factors.  - Phone review Echo, [facility]     Patient Active Problem List   Diagnosis    Adrenal nodule    Allergic rhinosinusitis    GERD  (gastroesophageal reflux disease)    Depression with anxiety    Chronic pain    Atherosclerosis of native coronary artery without angina pectoris    DEVONTE (obstructive sleep apnea), not on Rx due to cost, off 2015, diagnosed in 4/2014    Presbylarynx, lower pitch, hoarse voice    Thyroid nodule, see CT April 2014    S/P coronary artery stent placement, multiple, last in [facility] 2010    Type 2 diabetes mellitus, controlled    Hyperlipidemia with target LDL less than 70, baseline LDL not available    Obesity, Class I, BMI 30-34.9, today 31.4    DDD (degenerative disc disease), lumbar    Hypertension associated with diabetes    BMI 31.0-31.9,adult    Abdominal obesity    Sedentary lifestyle    Primary osteoarthritis involving multiple joints    WELLS (dyspnea on exertion)    Diastolic dysfunction    Thrombocytosis    Acute otitis externa of left ear    Chronic fatigue    Hypothyroidism due to acquired atrophy of thyroid    Mild dementia    Iron deficiency     Total face-to-face time with the patient was 35 minutes and greater than 50% was spent in counseling and coordination of care. The above assessment and plan have been discussed at length. Labs and procedure over the last 6 months reviewed. Problem List updated. Asked to bring in all active medications / pills bottles with next visit.

## 2017-09-20 ENCOUNTER — CLINICAL SUPPORT (OUTPATIENT)
Dept: CARDIOLOGY | Facility: CLINIC | Age: 77
End: 2017-09-20
Payer: MEDICARE

## 2017-09-20 DIAGNOSIS — I10 ESSENTIAL HYPERTENSION: ICD-10-CM

## 2017-09-20 DIAGNOSIS — R53.82 CHRONIC FATIGUE: ICD-10-CM

## 2017-09-20 DIAGNOSIS — R06.09 DOE (DYSPNEA ON EXERTION): ICD-10-CM

## 2017-09-20 DIAGNOSIS — I51.89 DIASTOLIC DYSFUNCTION: ICD-10-CM

## 2017-09-20 DIAGNOSIS — Z95.5 S/P CORONARY ARTERY STENT PLACEMENT: ICD-10-CM

## 2017-09-20 LAB
DIASTOLIC DYSFUNCTION: NO
GLOBAL PERICARDIAL EFFUSION: ABNORMAL
RETIRED EF AND QEF - SEE NOTES: 63 (ref 55–65)
TRICUSPID VALVE REGURGITATION: ABNORMAL

## 2017-09-20 PROCEDURE — 93306 TTE W/DOPPLER COMPLETE: CPT | Mod: S$GLB,,, | Performed by: INTERNAL MEDICINE

## 2017-09-20 RX ORDER — METOPROLOL TARTRATE 25 MG/1
25 TABLET, FILM COATED ORAL 2 TIMES DAILY
Qty: 180 TABLET | Refills: 3 | Status: SHIPPED | OUTPATIENT
Start: 2017-09-20 | End: 2018-05-23

## 2017-09-27 RX ORDER — AMLODIPINE BESYLATE 10 MG/1
10 TABLET ORAL DAILY
Qty: 90 TABLET | Refills: 3 | Status: SHIPPED | OUTPATIENT
Start: 2017-09-27 | End: 2018-06-27 | Stop reason: SDUPTHER

## 2017-10-04 RX ORDER — HYDROCHLOROTHIAZIDE 25 MG/1
25 TABLET ORAL DAILY
Qty: 90 TABLET | Refills: 3 | Status: SHIPPED | OUTPATIENT
Start: 2017-10-04 | End: 2018-09-19 | Stop reason: SDUPTHER

## 2017-10-04 NOTE — TELEPHONE ENCOUNTER
Please find out if patient is still taking HCTZ, on the prescription it states that it was discontinued while she was in the hospital

## 2017-10-04 NOTE — TELEPHONE ENCOUNTER
Spoke to patient son states patient never stop taking hctz 25mg states he can not remember if they were told to stop patient for taking,patient is still taking medication

## 2017-10-07 DIAGNOSIS — E11.9 TYPE 2 DIABETES MELLITUS WITHOUT COMPLICATION: ICD-10-CM

## 2017-10-09 RX ORDER — METFORMIN HYDROCHLORIDE 500 MG/1
TABLET ORAL
Qty: 180 TABLET | Refills: 3 | Status: SHIPPED | OUTPATIENT
Start: 2017-10-09 | End: 2018-09-23 | Stop reason: SDUPTHER

## 2017-10-09 RX ORDER — SERTRALINE HYDROCHLORIDE 50 MG/1
TABLET, FILM COATED ORAL
Qty: 30 TABLET | Refills: 6 | Status: SHIPPED | OUTPATIENT
Start: 2017-10-09 | End: 2018-05-02 | Stop reason: SDUPTHER

## 2017-11-01 ENCOUNTER — LAB VISIT (OUTPATIENT)
Dept: LAB | Facility: HOSPITAL | Age: 77
End: 2017-11-01
Attending: PHYSICIAN ASSISTANT
Payer: MEDICARE

## 2017-11-01 DIAGNOSIS — E11.8 CONTROLLED TYPE 2 DIABETES MELLITUS WITH COMPLICATION, WITHOUT LONG-TERM CURRENT USE OF INSULIN: ICD-10-CM

## 2017-11-01 LAB
ANION GAP SERPL CALC-SCNC: 10 MMOL/L
BUN SERPL-MCNC: 14 MG/DL
CALCIUM SERPL-MCNC: 10.2 MG/DL
CHLORIDE SERPL-SCNC: 104 MMOL/L
CO2 SERPL-SCNC: 26 MMOL/L
CREAT SERPL-MCNC: 1 MG/DL
EST. GFR  (AFRICAN AMERICAN): >60 ML/MIN/1.73 M^2
EST. GFR  (NON AFRICAN AMERICAN): 54.5 ML/MIN/1.73 M^2
ESTIMATED AVG GLUCOSE: 111 MG/DL
GLUCOSE SERPL-MCNC: 78 MG/DL
HBA1C MFR BLD HPLC: 5.5 %
POTASSIUM SERPL-SCNC: 3.7 MMOL/L
SODIUM SERPL-SCNC: 140 MMOL/L

## 2017-11-01 PROCEDURE — 83036 HEMOGLOBIN GLYCOSYLATED A1C: CPT

## 2017-11-01 PROCEDURE — 36415 COLL VENOUS BLD VENIPUNCTURE: CPT | Mod: PO

## 2017-11-01 PROCEDURE — 80048 BASIC METABOLIC PNL TOTAL CA: CPT

## 2017-11-06 ENCOUNTER — DOCUMENTATION ONLY (OUTPATIENT)
Dept: FAMILY MEDICINE | Facility: CLINIC | Age: 77
End: 2017-11-06

## 2017-11-06 NOTE — PROGRESS NOTES
Pre-Visit Chart Review  For Appointment Scheduled on 11/8/17.    Health Maintenance Due   Topic Date Due    Influenza Vaccine  08/01/2017    Eye Exam  08/10/2017

## 2017-11-08 ENCOUNTER — OFFICE VISIT (OUTPATIENT)
Dept: FAMILY MEDICINE | Facility: CLINIC | Age: 77
End: 2017-11-08
Payer: MEDICARE

## 2017-11-08 VITALS
BODY MASS INDEX: 31.77 KG/M2 | TEMPERATURE: 98 F | DIASTOLIC BLOOD PRESSURE: 68 MMHG | SYSTOLIC BLOOD PRESSURE: 129 MMHG | WEIGHT: 190.69 LBS | HEART RATE: 66 BPM | HEIGHT: 65 IN

## 2017-11-08 DIAGNOSIS — Z23 IMMUNIZATION DUE: ICD-10-CM

## 2017-11-08 DIAGNOSIS — I15.2 HYPERTENSION ASSOCIATED WITH DIABETES: ICD-10-CM

## 2017-11-08 DIAGNOSIS — E66.9 OBESITY, CLASS I, BMI 30-34.9: ICD-10-CM

## 2017-11-08 DIAGNOSIS — E11.59 HYPERTENSION ASSOCIATED WITH DIABETES: ICD-10-CM

## 2017-11-08 DIAGNOSIS — E11.9 CONTROLLED TYPE 2 DIABETES MELLITUS WITHOUT COMPLICATION, WITHOUT LONG-TERM CURRENT USE OF INSULIN: Primary | ICD-10-CM

## 2017-11-08 DIAGNOSIS — M85.80 OSTEOPENIA, UNSPECIFIED LOCATION: ICD-10-CM

## 2017-11-08 PROCEDURE — 99999 PR PBB SHADOW E&M-EST. PATIENT-LVL V: CPT | Mod: PBBFAC,,, | Performed by: FAMILY MEDICINE

## 2017-11-08 PROCEDURE — 99499 UNLISTED E&M SERVICE: CPT | Mod: S$GLB,,, | Performed by: FAMILY MEDICINE

## 2017-11-08 PROCEDURE — 99214 OFFICE O/P EST MOD 30 MIN: CPT | Mod: 25,S$GLB,, | Performed by: FAMILY MEDICINE

## 2017-11-08 PROCEDURE — 90662 IIV NO PRSV INCREASED AG IM: CPT | Mod: S$GLB,,, | Performed by: FAMILY MEDICINE

## 2017-11-08 PROCEDURE — G0008 ADMIN INFLUENZA VIRUS VAC: HCPCS | Mod: S$GLB,,, | Performed by: FAMILY MEDICINE

## 2017-11-15 ENCOUNTER — HOSPITAL ENCOUNTER (OUTPATIENT)
Dept: RADIOLOGY | Facility: CLINIC | Age: 77
Discharge: HOME | End: 2017-11-15
Attending: FAMILY MEDICINE
Payer: MEDICARE

## 2017-11-15 DIAGNOSIS — M85.80 OSTEOPENIA, UNSPECIFIED LOCATION: ICD-10-CM

## 2017-11-15 PROCEDURE — 77080 DXA BONE DENSITY AXIAL: CPT | Mod: 26,,, | Performed by: RADIOLOGY

## 2017-11-15 PROCEDURE — 77080 DXA BONE DENSITY AXIAL: CPT | Mod: TC,PO

## 2017-11-16 DIAGNOSIS — M51.36 DDD (DEGENERATIVE DISC DISEASE), LUMBAR: ICD-10-CM

## 2017-11-16 DIAGNOSIS — M47.817 LUMBOSACRAL SPONDYLOSIS WITHOUT MYELOPATHY: ICD-10-CM

## 2017-11-16 RX ORDER — HYDROCODONE BITARTRATE AND ACETAMINOPHEN 5; 325 MG/1; MG/1
1 TABLET ORAL EVERY 8 HOURS PRN
Qty: 90 TABLET | Refills: 0 | OUTPATIENT
Start: 2017-11-16

## 2017-11-16 RX ORDER — CLONAZEPAM 0.5 MG/1
0.5 TABLET ORAL 2 TIMES DAILY PRN
Qty: 30 TABLET | Refills: 0 | Status: SHIPPED | OUTPATIENT
Start: 2017-11-16 | End: 2018-07-18 | Stop reason: SDUPTHER

## 2017-11-18 PROBLEM — M81.0 OSTEOPOROSIS: Status: ACTIVE | Noted: 2017-11-18

## 2017-11-21 ENCOUNTER — TELEPHONE (OUTPATIENT)
Dept: FAMILY MEDICINE | Facility: CLINIC | Age: 77
End: 2017-11-21

## 2017-11-21 DIAGNOSIS — N28.9 DECREASED RENAL FUNCTION: Primary | ICD-10-CM

## 2017-11-21 DIAGNOSIS — E78.5 HYPERLIPIDEMIA WITH TARGET LDL LESS THAN 70: ICD-10-CM

## 2017-11-21 RX ORDER — IBANDRONATE SODIUM 150 MG/1
150 TABLET, FILM COATED ORAL
Qty: 1 TABLET | Refills: 11 | Status: SHIPPED | OUTPATIENT
Start: 2017-11-21 | End: 2018-09-30 | Stop reason: SDUPTHER

## 2017-11-21 NOTE — TELEPHONE ENCOUNTER
The patients son has been called and advised of the doctor recommendations her has verbalized full understanding and will need a new RX for the Lipitor.

## 2017-11-21 NOTE — TELEPHONE ENCOUNTER
Diabetes and cholesterol are a goal.   New recommendation is to be on a high intensity statin-let me know if we can increase the lipitor to 80.    Her kidney function is slightly low, needs to stay hydrated.  I am not concerned about the elevaed alk phos as it is not significantly elevated and the other lfts are not elevated.     boniva sent to pharmacy

## 2017-11-21 NOTE — TELEPHONE ENCOUNTER
----- Message from Deidre Reese LPN sent at 11/20/2017  3:36 PM CST -----  Patient son notified. Ok with starting rx for osteoporosis.patient son has concerns about labs and elevated Alkaline Phosphatase  level

## 2017-11-21 NOTE — PROGRESS NOTES
CHIEF COMPLAINT:  Follow up      HISTORY OF PRESENT ILLNESS:  Jerica Rios is a 77 y.o. female who presents to clinic for follow up on chronic medical conditions. She is accompanied by her son.    1. Type 2 DM, controlled: a last HgA1c was 6.%.   She is on glucophage. She is on a statin. She is on an ARB. She is up to date on her immunizations. She is going to establish care with a new optometrist.     2. Hyperlipidemia, CAD: She is on lipitor and plavix. She denies any myalgia, dark colored urine.    3. HTN:Ppatient is on lopressor, norvasc, HCTZ, and avapro and potassium supplementation. She denies any CP, SOB, cough, edema.     4. Hypothyroidism: Patient is on synthroid. She denies any symptoms of hypo or hyperthyroidism. She has a thyroid nodule, being followed by endocrinology and is undergoing every 2 year thyroid ultrasounds    5. Gait instability: she is using a get up and go cane to help with  gait instability    6. She is due for influenza vccine.    7. She is due for a DEXA scan    REVIEW OF SYSTEMS:  The patient denies any fever, chills, night sweats, headaches, vision changes, difficulty speaking or swallowing, decreased hearing, weight loss, weight gain, chest pain, palpitations, shortness of breath, cough, nausea, vomiting, abdominal pain, dysuria, diarrhea, constipation, hematuria, hematochezia, melena, changes in her hair, nails, numbness or weakness in her extremities, erythema,swelling over any of her joints, myalgias, swollen glands, easy bruising, fatigue, edema.She denies any vaginal discharge, breast masses, nipple discharge, change in the skin overlying her breasts.      MEDICATIONS:   Reviewed and/or reconciled in EPIC    ALLERGIES:  Reviewed and/or reconciled in EPIC    PAST MEDICAL/SURGICAL HISTORY:   Past Medical History:   Diagnosis Date    Allergy     Arthritis     Carotid artery disease     Coronary artery disease     Diabetes mellitus     Diabetes mellitus, type 2      "Fall at home     GERD (gastroesophageal reflux disease)     Headache(784.0)     Hyperlipidemia     Hypertension     Mental disorder     Obesity     Right rib fracture     Snoring     Toe fracture, right       Past Surgical History:   Procedure Laterality Date    CHOLECYSTECTOMY      CORONARY STENT PLACEMENT      x 5    CYST REMOVAL      sebaceous cyst from back    EYE SURGERY      OVARIAN CYST REMOVAL      SINUS SURGERY      TONSILLECTOMY         FAMILY HISTORY:    Family History   Problem Relation Age of Onset    Heart disease Mother     Diabetes Mother     Cancer Mother      breast cancer    Heart disease Father     Heart disease Sister     Stroke Brother     Dementia Brother     Heart disease Brother        SOCIAL HISTORY:    Social History     Social History    Marital status:      Spouse name: N/A    Number of children: N/A    Years of education: N/A     Occupational History    Not on file.     Social History Main Topics    Smoking status: Never Smoker    Smokeless tobacco: Never Used    Alcohol use No    Drug use: No    Sexual activity: Not Currently     Partners: Male     Other Topics Concern    Not on file     Social History Narrative    No narrative on file       PHYSICAL EXAM:  VITAL SIGNS:   Vitals:    11/08/17 1533   BP: 129/68   BP Location: Right arm   Patient Position: Sitting   BP Method: Small (Automatic)   Pulse: 66   Temp: 98.4 °F (36.9 °C)   TempSrc: Oral   Weight: 86.5 kg (190 lb 11.2 oz)   Height: 5' 5" (1.651 m)     GENERAL:  Patient appears well nourished, sitting on exam table, in no acute distress.  HEENT:  Atraumatic, normocephalic, PERRLA, EOMI, no conjunctival injection, sclerae are anicteric, normal external auditory canals,TMs clear b/l, gross hearing intact to whisper, MMM, no oropharygneal erythema or exudate.  NECK:  Supple, normal ROM, trachea is midline , no supraclavicular or cervical LAD or masses palpated.  Thyroid gland not " palpable.  CARDIOVASCULAR:  RRR, normal S1 and S2, no m/r/g.  RESPIRATORY:  CTA b/l, no wheezes, rhonchi, rales.  No increased work of breathing, no  use of accessory muscles.  ABDOMEN:  Soft, nontender, nondistended, normoactive bowel sounds in all four quadrants, no rebound or guarding, no HSM or masses palpated.  Normal percussion.  EXTREMITIES:  2+ DP pulses b/l, no edema.  SKIN:  Warm, no lesions on exposed skin.  NEUROMUSCULAR:  Cranial nerves II-XII grossly intact.No clubbing or cyanosis of digits/nails.  Slow gait, she ambulates with a cane  PSYCH:  Patient is alert and oriented to person, time, place. They are appropriately dressed and groomed. There is normal eye contact. Rate and tone of speech is normal. Normal insight, judgement. Normal thought content and process.     LABORATORY/IMAGING STUDIES: pending    ASSESSMENT/PLAN: This is a 77 y.o. female who presents to clinic for evaluation of the following concerns  1. Controlled type 2 diabetes mellitus without complication, without long-term current use of insulin  See below    2. Hypertension associated with diabetes  See below    3. Obesity, Class I, BMI 30-34.9, today 31.4  See below    4. Osteopenia, unspecified location  - DXA Bone Density Spine And Hip; Future    5. Immunization due  - Influenza - High Dose (65+) (PF) (IM)    Patient readiness: acceptance and barriers:none    During the course of the visit the patient was educated and counseled about the following:     Diabetes:  CMP, lipid panel, urine microalbumin/cr  Hypertension:   Medication: no change.  Obesity:   General weight loss/lifestyle modification strategies discussed (elicit support from others; identify saboteurs; non-food rewards, etc).  Diet interventions: moderate (500 kCal/d) deficit diet.  Informal exercise measures discussed, e.g. taking stairs instead of elevator.  Regular aerobic exercise program discussed.    Goals: Diabetes: Maintain Hemoglobin A1C below 7, Hypertension:  Reduce Blood Pressure and Obesity: Reduce calorie intake and BMI    Did patient meet goals/outcomes: yes    The following self management tools provided: declined    Patient Instructions (the written plan) was given to the patient/family.     Time spent with patient: 30 minutes      Krissy Ramírez MD

## 2017-11-24 RX ORDER — ATORVASTATIN CALCIUM 80 MG/1
80 TABLET, FILM COATED ORAL DAILY
Qty: 90 TABLET | Refills: 3 | Status: SHIPPED | OUTPATIENT
Start: 2017-11-24 | End: 2018-11-09 | Stop reason: SDUPTHER

## 2017-11-24 NOTE — TELEPHONE ENCOUNTER
lipitor increased. Needs cmp, lipid panel in 3 months. If she has any side effects with the 80 mg, needs to let me know, and needs to go back down to 40 mg

## 2017-11-27 NOTE — TELEPHONE ENCOUNTER
----- Message from Arabella Faith sent at 11/27/2017  3:59 PM CST -----  Contact: Matthew Rios III (Son) [phone]  Matthew Rios III (Son) [phone], returning phone call

## 2017-11-29 RX ORDER — BACLOFEN 10 MG/1
TABLET ORAL
Qty: 90 TABLET | Refills: 0 | Status: SHIPPED | OUTPATIENT
Start: 2017-11-29 | End: 2018-02-24 | Stop reason: SDUPTHER

## 2017-12-15 RX ORDER — IRBESARTAN 300 MG/1
TABLET ORAL
Qty: 90 TABLET | Refills: 2 | Status: SHIPPED | OUTPATIENT
Start: 2017-12-15 | End: 2018-09-21 | Stop reason: SDUPTHER

## 2018-01-17 ENCOUNTER — PATIENT MESSAGE (OUTPATIENT)
Dept: NEUROLOGY | Facility: CLINIC | Age: 78
End: 2018-01-17

## 2018-01-18 ENCOUNTER — PATIENT MESSAGE (OUTPATIENT)
Dept: NEUROLOGY | Facility: CLINIC | Age: 78
End: 2018-01-18

## 2018-01-18 DIAGNOSIS — F03.A0 MILD DEMENTIA: ICD-10-CM

## 2018-01-18 RX ORDER — MEMANTINE HYDROCHLORIDE 10 MG/1
10 TABLET ORAL 2 TIMES DAILY
Qty: 60 TABLET | Refills: 5 | Status: SHIPPED | OUTPATIENT
Start: 2018-01-18 | End: 2018-07-19 | Stop reason: SDUPTHER

## 2018-01-27 RX ORDER — CLOPIDOGREL BISULFATE 75 MG/1
TABLET ORAL
Qty: 30 TABLET | Refills: 11 | Status: SHIPPED | OUTPATIENT
Start: 2018-01-27 | End: 2018-12-18 | Stop reason: SDUPTHER

## 2018-02-25 RX ORDER — BACLOFEN 10 MG/1
TABLET ORAL
Qty: 90 TABLET | Refills: 0 | Status: SHIPPED | OUTPATIENT
Start: 2018-02-25 | End: 2018-05-23 | Stop reason: SDUPTHER

## 2018-02-28 ENCOUNTER — LAB VISIT (OUTPATIENT)
Dept: LAB | Facility: HOSPITAL | Age: 78
End: 2018-02-28
Attending: FAMILY MEDICINE
Payer: MEDICARE

## 2018-02-28 DIAGNOSIS — E78.5 HYPERLIPIDEMIA WITH TARGET LDL LESS THAN 70: ICD-10-CM

## 2018-02-28 LAB
ALBUMIN SERPL BCP-MCNC: 4.1 G/DL
ALP SERPL-CCNC: 132 U/L
ALT SERPL W/O P-5'-P-CCNC: 23 U/L
ANION GAP SERPL CALC-SCNC: 12 MMOL/L
AST SERPL-CCNC: 17 U/L
BILIRUB SERPL-MCNC: 0.5 MG/DL
BUN SERPL-MCNC: 13 MG/DL
CALCIUM SERPL-MCNC: 10.3 MG/DL
CHLORIDE SERPL-SCNC: 103 MMOL/L
CHOLEST SERPL-MCNC: 108 MG/DL
CHOLEST/HDLC SERPL: 2.3 {RATIO}
CO2 SERPL-SCNC: 26 MMOL/L
CREAT SERPL-MCNC: 1 MG/DL
EST. GFR  (AFRICAN AMERICAN): >60 ML/MIN/1.73 M^2
EST. GFR  (NON AFRICAN AMERICAN): 54.1 ML/MIN/1.73 M^2
GLUCOSE SERPL-MCNC: 88 MG/DL
HDLC SERPL-MCNC: 48 MG/DL
HDLC SERPL: 44.4 %
LDLC SERPL CALC-MCNC: 52 MG/DL
NONHDLC SERPL-MCNC: 60 MG/DL
POTASSIUM SERPL-SCNC: 4 MMOL/L
PROT SERPL-MCNC: 7.3 G/DL
SODIUM SERPL-SCNC: 141 MMOL/L
TRIGL SERPL-MCNC: 40 MG/DL

## 2018-02-28 PROCEDURE — 80053 COMPREHEN METABOLIC PANEL: CPT

## 2018-02-28 PROCEDURE — 80061 LIPID PANEL: CPT

## 2018-02-28 PROCEDURE — 36415 COLL VENOUS BLD VENIPUNCTURE: CPT | Mod: PO

## 2018-03-06 ENCOUNTER — PATIENT MESSAGE (OUTPATIENT)
Dept: NEUROLOGY | Facility: CLINIC | Age: 78
End: 2018-03-06

## 2018-03-06 DIAGNOSIS — F03.90 DEMENTIA WITHOUT BEHAVIORAL DISTURBANCE, UNSPECIFIED DEMENTIA TYPE: Primary | ICD-10-CM

## 2018-03-06 RX ORDER — RIVASTIGMINE TARTRATE 1.5 MG/1
1.5 CAPSULE ORAL 2 TIMES DAILY
Qty: 60 CAPSULE | Refills: 11 | Status: SHIPPED | OUTPATIENT
Start: 2018-03-06 | End: 2018-07-23 | Stop reason: SDUPTHER

## 2018-05-02 RX ORDER — SERTRALINE HYDROCHLORIDE 50 MG/1
TABLET, FILM COATED ORAL
Qty: 30 TABLET | Refills: 6 | Status: SHIPPED | OUTPATIENT
Start: 2018-05-02 | End: 2018-11-06 | Stop reason: SDUPTHER

## 2018-05-09 ENCOUNTER — DOCUMENTATION ONLY (OUTPATIENT)
Dept: FAMILY MEDICINE | Facility: CLINIC | Age: 78
End: 2018-05-09

## 2018-05-09 NOTE — PROGRESS NOTES
Pre-Visit Chart Review  For Appointment Scheduled on 5/23/18.    Health Maintenance Due   Topic Date Due    Eye Exam  08/24/2017    Hemoglobin A1c  05/15/2018

## 2018-05-16 DIAGNOSIS — E11.8 TYPE 2 DIABETES MELLITUS WITH COMPLICATION, WITHOUT LONG-TERM CURRENT USE OF INSULIN: Primary | ICD-10-CM

## 2018-05-23 ENCOUNTER — HOSPITAL ENCOUNTER (OUTPATIENT)
Dept: RADIOLOGY | Facility: HOSPITAL | Age: 78
Discharge: HOME | End: 2018-05-23
Attending: FAMILY MEDICINE
Payer: MEDICARE

## 2018-05-23 ENCOUNTER — OFFICE VISIT (OUTPATIENT)
Dept: FAMILY MEDICINE | Facility: CLINIC | Age: 78
End: 2018-05-23
Payer: MEDICARE

## 2018-05-23 VITALS
HEIGHT: 65 IN | BODY MASS INDEX: 32.73 KG/M2 | TEMPERATURE: 99 F | SYSTOLIC BLOOD PRESSURE: 129 MMHG | HEART RATE: 92 BPM | DIASTOLIC BLOOD PRESSURE: 72 MMHG | WEIGHT: 196.44 LBS

## 2018-05-23 DIAGNOSIS — I15.2 HYPERTENSION ASSOCIATED WITH DIABETES: ICD-10-CM

## 2018-05-23 DIAGNOSIS — E11.59 HYPERTENSION ASSOCIATED WITH DIABETES: ICD-10-CM

## 2018-05-23 DIAGNOSIS — E78.5 HYPERLIPIDEMIA WITH TARGET LDL LESS THAN 70: ICD-10-CM

## 2018-05-23 DIAGNOSIS — E03.4 HYPOTHYROIDISM DUE TO ACQUIRED ATROPHY OF THYROID: ICD-10-CM

## 2018-05-23 DIAGNOSIS — M25.569 KNEE PAIN, UNSPECIFIED CHRONICITY, UNSPECIFIED LATERALITY: ICD-10-CM

## 2018-05-23 DIAGNOSIS — E66.9 OBESITY, CLASS I, BMI 30-34.9: ICD-10-CM

## 2018-05-23 DIAGNOSIS — E11.9 CONTROLLED TYPE 2 DIABETES MELLITUS WITHOUT COMPLICATION, WITHOUT LONG-TERM CURRENT USE OF INSULIN: Primary | ICD-10-CM

## 2018-05-23 PROCEDURE — 99214 OFFICE O/P EST MOD 30 MIN: CPT | Mod: S$GLB,,, | Performed by: FAMILY MEDICINE

## 2018-05-23 PROCEDURE — 73562 X-RAY EXAM OF KNEE 3: CPT | Mod: 26,LT,, | Performed by: RADIOLOGY

## 2018-05-23 PROCEDURE — 73562 X-RAY EXAM OF KNEE 3: CPT | Mod: 26,RT,, | Performed by: RADIOLOGY

## 2018-05-23 PROCEDURE — 99499 UNLISTED E&M SERVICE: CPT | Mod: S$GLB,,, | Performed by: FAMILY MEDICINE

## 2018-05-23 PROCEDURE — 3074F SYST BP LT 130 MM HG: CPT | Mod: CPTII,S$GLB,, | Performed by: FAMILY MEDICINE

## 2018-05-23 PROCEDURE — 99999 PR PBB SHADOW E&M-EST. PATIENT-LVL III: CPT | Mod: PBBFAC,,, | Performed by: FAMILY MEDICINE

## 2018-05-23 PROCEDURE — 73562 X-RAY EXAM OF KNEE 3: CPT | Mod: TC,50,FY

## 2018-05-23 PROCEDURE — 3078F DIAST BP <80 MM HG: CPT | Mod: CPTII,S$GLB,, | Performed by: FAMILY MEDICINE

## 2018-05-23 RX ORDER — MULTIVITAMIN
1 TABLET ORAL 2 TIMES DAILY
COMMUNITY
End: 2019-12-11

## 2018-05-23 RX ORDER — BACLOFEN 10 MG/1
TABLET ORAL
Qty: 90 TABLET | Refills: 0 | Status: SHIPPED | OUTPATIENT
Start: 2018-05-23 | End: 2018-08-18 | Stop reason: SDUPTHER

## 2018-05-23 NOTE — PROGRESS NOTES
CHIEF COMPLAINT:  Follow up      HISTORY OF PRESENT ILLNESS:  Jerica Rios is a 78 y.o. female who presents to clinic for follow up on chronic medical conditions. She is accompanied by her son.    1. Type 2 DM, controlled: a last HgA1c was 5.5%.   She is on glucophage. She is on a statin. She is on an ARB. She is up to date on her immunizations. She is going to establish care with a new optometrist.     2. Hyperlipidemia, CAD: She is on lipitor and plavix. She denies any myalgia, dark colored urine.    3. HTN:Patient is on lopressor, norvasc, HCTZ, and avapro and potassium supplementation. She denies any CP, SOB, cough, edema.     4. Hypothyroidism: Patient is on synthroid. She denies any symptoms of hypo or hyperthyroidism. She has a thyroid nodule, being followed by endocrinology and is undergoing every 2 year thyroid ultrasounds    5. She recently fell and landed on her knees as she was not using her rollator when walking in her hallway. She did not injure her head/neck. Since then she has had b/l knee pain which is somewhat improved with OTC tylenol arthritis.     REVIEW OF SYSTEMS:  The patient denies any fever, chills, night sweats, headaches, vision changes, difficulty speaking or swallowing, decreased hearing, weight loss, weight gain, chest pain, palpitations, shortness of breath, cough, nausea, vomiting, abdominal pain, dysuria, diarrhea, constipation, hematuria, hematochezia, melena, changes in her hair, nails, numbness or weakness in her extremities, erythema,swelling over any of her joints, myalgias, swollen glands, easy bruising, fatigue, edema.She denies any vaginal discharge, breast masses, nipple discharge, change in the skin overlying her breasts.      MEDICATIONS:   Reviewed and/or reconciled in EPIC    ALLERGIES:  Reviewed and/or reconciled in [facility]    PAST MEDICAL/SURGICAL HISTORY:   Past Medical History:   Diagnosis Date    Allergy     Arthritis     Carotid artery disease      "Coronary artery disease     Diabetes mellitus     Diabetes mellitus, type 2     Fall at home     GERD (gastroesophageal reflux disease)     Headache(784.0)     Hyperlipidemia     Hypertension     Mental disorder     Obesity     Right rib fracture     Snoring     Toe fracture, right       Past Surgical History:   Procedure Laterality Date    CHOLECYSTECTOMY      CORONARY STENT PLACEMENT      x 5    CYST REMOVAL      sebaceous cyst from back    EYE SURGERY      OVARIAN CYST REMOVAL      SINUS SURGERY      TONSILLECTOMY         FAMILY HISTORY:    Family History   Problem Relation Age of Onset    Heart disease Mother     Diabetes Mother     Cancer Mother         breast cancer    Heart disease Father     Heart disease Sister     Stroke Brother     Dementia Brother     Heart disease Brother        SOCIAL HISTORY:    Social History     Social History    Marital status:      Spouse name: N/A    Number of children: N/A    Years of education: N/A     Occupational History    Not on file.     Social History Main Topics    Smoking status: Never Smoker    Smokeless tobacco: Never Used    Alcohol use No    Drug use: No    Sexual activity: Not Currently     Partners: Male     Other Topics Concern    Not on file     Social History Narrative    No narrative on file       PHYSICAL EXAM:  VITAL SIGNS:   Vitals:    05/23/18 1501   BP: 129/72   BP Location: Left arm   Patient Position: Sitting   BP Method: Medium (Automatic)   Pulse: 92   Temp: 98.8 °F (37.1 °C)   TempSrc: Oral   Weight: 89.1 kg (196 lb 6.9 oz)   Height: 5' 5" (1.651 m)     GENERAL:  Patient appears well nourished, sitting on exam table, in no acute distress.  HEENT:  Atraumatic, normocephalic, PERRLA, EOMI, no conjunctival injection, sclerae are anicteric, normal external auditory canals,TMs clear b/l, gross hearing intact to whisper, MMM, no oropharygneal erythema or exudate.  NECK:  Supple, normal ROM, trachea is midline , " no supraclavicular or cervical LAD or masses palpated.  Thyroid gland not palpable.  CARDIOVASCULAR:  RRR, normal S1 and S2, no m/r/g.  RESPIRATORY:  CTA b/l, no wheezes, rhonchi, rales.  No increased work of breathing, no  use of accessory muscles.  ABDOMEN:  Soft, nontender, nondistended, normoactive bowel sounds in all four quadrants, no rebound or guarding, no HSM or masses palpated.  Normal percussion.  EXTREMITIES:  2+ DP pulses b/l, no edema.  SKIN:  Warm, no lesions on exposed skin.  NEUROMUSCULAR:  Cranial nerves II-XII grossly intact.No clubbing or cyanosis of digits/nails.  Slow gait, she ambulates with a rollator  PSYCH:  Patient is alert and oriented to person, time, place. They are appropriately dressed and groomed. There is normal eye contact. Rate and tone of speech is normal. Normal insight, judgement. Normal thought content and process.     LABORATORY/IMAGING STUDIES: pending    ASSESSMENT/PLAN: This is a 78 y.o. female who presents to clinic for evaluation of the following concerns  1. Controlled type 2 diabetes mellitus without complication, without long-term current use of insulin  See below    2. Hyperlipidemia with target LDL less than 70, baseline LDL not available  Continue with lipitor    3. Obesity, Class I, BMI 30-34.9, today 31.4  See below    4. Hypothyroidism due to acquired atrophy of thyroid  - TSH; Future    5. Hypertension associated with diabetes  See below    6. Knee pain, unspecified chronicity, unspecified laterality  - Ambulatory referral to Orthopedics  - X-ray Knee Ortho Bilateral; Future  eadiness: acceptance and barriers:none    During the course of the visit the patient was educated and counseled about the following:     Diabetes:  Hga1c  Hypertension:   Medication: no change.  Obesity:   General weight loss/lifestyle modification strategies discussed (elicit support from others; identify saboteurs; non-food rewards, etc).  Diet interventions: moderate (500 kCal/d)  deficit diet.  Informal exercise measures discussed, e.g. taking stairs instead of elevator.  Regular aerobic exercise program discussed.    Goals: Diabetes: Maintain Hemoglobin A1C below 7, Hypertension: Reduce Blood Pressure and Obesity: Reduce calorie intake and BMI    Did patient meet goals/outcomes: yes    The following self management tools provided: declined    Patient Instructions (the written plan) was given to the patient/family.     Time spent with patient: 30 minutes      Krissy Ramírez MD

## 2018-05-30 ENCOUNTER — OFFICE VISIT (OUTPATIENT)
Dept: ORTHOPEDICS | Facility: CLINIC | Age: 78
End: 2018-05-30
Payer: MEDICARE

## 2018-05-30 VITALS
HEART RATE: 82 BPM | WEIGHT: 196.44 LBS | SYSTOLIC BLOOD PRESSURE: 131 MMHG | DIASTOLIC BLOOD PRESSURE: 62 MMHG | HEIGHT: 65 IN | BODY MASS INDEX: 32.73 KG/M2

## 2018-05-30 DIAGNOSIS — M17.10 ARTHRITIS OF KNEE: Primary | ICD-10-CM

## 2018-05-30 PROCEDURE — 99203 OFFICE O/P NEW LOW 30 MIN: CPT | Mod: 25,S$GLB,, | Performed by: ORTHOPAEDIC SURGERY

## 2018-05-30 PROCEDURE — 3075F SYST BP GE 130 - 139MM HG: CPT | Mod: CPTII,S$GLB,, | Performed by: ORTHOPAEDIC SURGERY

## 2018-05-30 PROCEDURE — 99999 PR PBB SHADOW E&M-EST. PATIENT-LVL III: CPT | Mod: PBBFAC,,, | Performed by: ORTHOPAEDIC SURGERY

## 2018-05-30 PROCEDURE — 3078F DIAST BP <80 MM HG: CPT | Mod: CPTII,S$GLB,, | Performed by: ORTHOPAEDIC SURGERY

## 2018-05-30 PROCEDURE — 20610 DRAIN/INJ JOINT/BURSA W/O US: CPT | Mod: 50,S$GLB,, | Performed by: ORTHOPAEDIC SURGERY

## 2018-05-30 RX ADMIN — TRIAMCINOLONE ACETONIDE 40 MG: 40 INJECTION, SUSPENSION INTRA-ARTICULAR; INTRAMUSCULAR at 10:05

## 2018-05-30 NOTE — LETTER
May 31, 2018      Krissy Ramírez MD  [address]  [facility] [address]           [address] [facility] [address]  [facility] [address]  Phone: [phone]          Patient: Jerica Rios   MR Number: [id number]   YOB: 1940   Date of Visit: 5/30/2018       Dear Dr. Krissy Ramírez:    Thank you for referring Jerica Rios to me for evaluation. Attached you will find relevant portions of my assessment and plan of care.    If you have questions, please do not hesitate to call me. I look forward to following Jerica Rios along with you.    Sincerely,    Timi Nair MD    Enclosure  CC:  No Recipients    If you would like to receive this communication electronically, please contact [email or web address] or [phone] to request more information on [employer] Link access.    For providers and/or their staff who would like to refer a patient to [facility], please contact us through our one-stop-shop provider referral line, [facility] , at [phone].    If you feel you have received this communication in error or would no longer like to receive these types of communications, please e-mail [email or web address]

## 2018-05-31 RX ORDER — TRIAMCINOLONE ACETONIDE 40 MG/ML
40 INJECTION, SUSPENSION INTRA-ARTICULAR; INTRAMUSCULAR
Status: DISCONTINUED | OUTPATIENT
Start: 2018-05-30 | End: 2018-06-01 | Stop reason: HOSPADM

## 2018-06-01 NOTE — PROCEDURES
Large Joint Aspiration/Injection  Date/Time: 5/30/2018 10:47 PM  Performed by: JOANNA GARCIA  Authorized by: JOANNA GARCIA     Consent Done?:  Yes (Verbal)  Indications:  Pain  Timeout: Prior to procedure the correct patient, procedure, and site was verified      Location:  Knee  Site:  R knee and L knee  Approach:  Anteromedial  Medications:  40 mg triamcinolone acetonide 40 mg/mL; 40 mg triamcinolone acetonide 40 mg/mL

## 2018-06-01 NOTE — PROGRESS NOTES
Past Medical History:   Diagnosis Date    Allergy     Arthritis     Carotid artery disease     Coronary artery disease     Diabetes mellitus     Diabetes mellitus, type 2     Fall at home     GERD (gastroesophageal reflux disease)     Headache(784.0)     Hyperlipidemia     Hypertension     Mental disorder     Obesity     Right rib fracture     Snoring     Toe fracture, right        Past Surgical History:   Procedure Laterality Date    CHOLECYSTECTOMY      CORONARY STENT PLACEMENT      x 5    CYST REMOVAL      sebaceous cyst from back    EYE SURGERY      OVARIAN CYST REMOVAL      SINUS SURGERY      TONSILLECTOMY         Current Outpatient Prescriptions   Medication Sig    amlodipine (NORVASC) 10 MG tablet TAKE 1 TABLET (10 MG TOTAL) BY MOUTH ONCE DAILY.    atorvastatin (LIPITOR) 80 MG tablet Take 1 tablet (80 mg total) by mouth once daily.    baclofen (LIORESAL) 10 MG tablet START WITH 1/2 TABLET EVERY EVENING X 1 WEEK THEN INCREASE TO 1 TABLET EVERY EVENING    calcium-vitamin D (CALCIUM 600 + D,3,) 600 mg(1,500mg) -400 unit Tab Take 1 tablet by mouth 2 (two) times daily.    clonazePAM (KLONOPIN) 0.5 MG tablet Take 1 tablet (0.5 mg total) by mouth 2 (two) times daily as needed for Anxiety.    clopidogrel (PLAVIX) 75 mg tablet TAKE 1 TABLET BY MOUTH EVERY DAY -MUST FOLLOW UP WITH CARDIOLOGIST    esomeprazole (NEXIUM) 20 MG capsule Take 20 mg by mouth once daily. Patient takes OTC nexium    hydrochlorothiazide (HYDRODIURIL) 25 MG tablet TAKE 1 TABLET (25 MG TOTAL) BY MOUTH ONCE DAILY.    ibandronate (BONIVA) 150 mg tablet Take 1 tablet (150 mg total) by mouth every 30 days.    irbesartan (AVAPRO) 300 MG tablet TAKE 1 TABLET BY MOUTH EVERY EVENING.    lancets 33 gauge Misc 1 lancet by Misc.(Non-Drug; Combo Route) route once daily.    levothyroxine (SYNTHROID) 50 MCG tablet TAKE 1 TABLET BY MOUTH EVERY DAY IN THE MORNING ON EMPTY STOMACH    magnesium oxide (MAG-OXIDE) 400 mg tablet  Take 1 tablet (400 mg total) by mouth once daily. Every day    melatonin 3 mg Tab Take 3 mg by mouth nightly.    memantine (NAMENDA) 10 MG Tab Take 1 tablet (10 mg total) by mouth 2 (two) times daily.    metformin (GLUCOPHAGE) 500 MG tablet TAKE 1 TABLET (500 MG TOTAL) BY MOUTH 2 (TWO) TIMES DAILY WITH MEALS.    MULTIVITAMIN ORAL Take 1 Package by mouth once daily. Pt takes Diabetic Vitamin Pack    nitroGLYCERIN (NITROSTAT) 0.4 MG SL tablet Place 1 tablet (0.4 mg total) under the tongue every 5 (five) minutes as needed for Chest pain. As directed PRN    potassium chloride (MICRO-K) 10 MEQ CpSR Take 1 capsule (10 mEq total) by mouth once daily.    rivastigmine tartrate (EXELON) 1.5 MG capsule Take 1 capsule (1.5 mg total) by mouth 2 (two) times daily.    sertraline (ZOLOFT) 50 MG tablet TAKE 1 TABLET BY MOUTH EVERY DAY    vitamin D 1000 units Tab Take 1,000 Units by mouth once daily.      No current facility-administered medications for this visit.        Review of patient's allergies indicates:   Allergen Reactions    Amoxicillin-pot clavulanate      Other reaction(s): CONSTIPATION    Meperidine      Other reaction(s): irritability  Other reaction(s): aggressiveness    Propoxyphene n-acetaminophen      Other reaction(s): irritable  Other reaction(s): aggressive    Sulfa (sulfonamide antibiotics)      Other reaction(s): Rash       Family History   Problem Relation Age of Onset    Heart disease Mother     Diabetes Mother     Cancer Mother         breast cancer    Heart disease Father     Heart disease Sister     Stroke Brother     Dementia Brother     Heart disease Brother        Social History     Social History    Marital status:      Spouse name: N/A    Number of children: N/A    Years of education: N/A     Occupational History    Not on file.     Social History Main Topics    Smoking status: Never Smoker    Smokeless tobacco: Never Used    Alcohol use No    Drug use: No     "Sexual activity: Not Currently     Partners: Male     Other Topics Concern    Not on file     Social History Narrative    No narrative on file       Chief Complaint:   Chief Complaint   Patient presents with    Left Knee - Pain    Right Knee - Pain       Consulting Physician: Krissy Ramírez MD    History of present illness:    This is a 78 y.o. female who complains of bilateral knee pain for years. Recent fall made worse. Pain 6/10 and worse with use.    Review of Systems:    Constitution: Denies chills, fever, and sweats.  HENT: Denies headaches or blurry vision.  Cardiovascular: Denies chest pain or irregular heart beat.  Respiratory: Denies cough or shortness of breath.  Gastrointestinal: Denies abdominal pain, nausea, or vomiting.  Musculoskeletal:  Denies muscle cramps.  Neurological: Denies dizziness or focal weakness.  Psychiatric/Behavioral: Normal mental status.  Hematologic/Lymphatic: Denies bleeding problem or easy bruising/bleeding.  Skin: Denies rash or suspicious lesions.    Examination:    Vital Signs:    Vitals:    05/30/18 1437   BP: 131/62   Pulse: 82   Weight: 89.1 kg (196 lb 6.9 oz)   Height: 5' 5" (1.651 m)   PainSc:   6   PainLoc: Knee       Body mass index is 32.69 kg/m².    This a well-developed, well nourished patient in no acute distress.    Alert and oriented x 3 and cooperative to examination.       Physical Exam: Left Knee Exam    Gait   Antalgic    Skin  Rash:   None  Scars:   None    Inspection  Erythema:  None  Bruising:  None  Effusion:  None  Masses:  None  Lymphadenopathy: None    Range of Motion: 0 to 130°    Medial Joint : y  Lateral Joint : n    Patellofemoral Tenderness: Yes  Patellofemoral Crepitus: Yes    Lachman:  Normal  Anterior Drawer: Normal  Posterior Drawer: Normal    Dusty's:  Negative  Apley's:  Negative    Varus Stress:  Stable  Valgus Stress:  Stable    Strength:  5/5    Pulses:  Palpable  Sensation:  Intact      Right Knee " Exam    Gait   Antalgic    Skin  Rash:   None  Scars:   None    Inspection  Erythema:  None  Bruising:  None  Effusion:  None  Masses:  None  Lymphadenopathy: None    Range of Motion: 0 to 130°    Medial Joint : y  Lateral Joint : n    Patellofemoral Tenderness: Yes  Patellofemoral Crepitus: Yes    Lachman:  Normal  Anterior Drawer: Normal  Posterior Drawer: Normal    Dusty's:  Negative  Apley's:  Negative    Varus Stress:  Stable  Valgus Stress:  Stable    Strength:  5/5    Pulses:  Palpable  Sensation:  Intact          Imaging: X-rays ordered and reviewed today personally of both knee show moderate to severe DJD.        Assessment: Arthritis of knee  -     Large Joint Aspiration/Injection        Plan:  Right worse than left. Will inject steroid today and order euflexxa.      DISCLAIMER: This note may have been dictated using voice recognition software and may contain grammatical errors.     NOTE: Consult report sent to referring provider via [employer] EMR.

## 2018-06-07 DIAGNOSIS — M17.0 PRIMARY OSTEOARTHRITIS OF BOTH KNEES: Primary | ICD-10-CM

## 2018-06-12 ENCOUNTER — TELEPHONE (OUTPATIENT)
Dept: ORTHOPEDICS | Facility: CLINIC | Age: 78
End: 2018-06-12

## 2018-06-12 NOTE — TELEPHONE ENCOUNTER
----- Message from Shelli Womack sent at 6/12/2018  1:08 PM CDT -----  Type: Needs Medical Advice    Who Called:  Rosalio Rios - son   Symptoms (please be specific):  n/a  How long has patient had these symptoms:  n/a  Pharmacy name and phone #:  n/a  Best Call Back Number: [phone]  Additional Information: Rosalio received a call states insurance has not approved at this time injections in patient knees, contact to advise if her 06/13/18 appointment should be rescheduled

## 2018-06-24 DIAGNOSIS — E03.9 HYPOTHYROIDISM: ICD-10-CM

## 2018-06-25 RX ORDER — LEVOTHYROXINE SODIUM 50 UG/1
TABLET ORAL
Qty: 90 TABLET | Refills: 3 | Status: SHIPPED | OUTPATIENT
Start: 2018-06-25 | End: 2019-03-24 | Stop reason: SDUPTHER

## 2018-06-25 RX ORDER — ATORVASTATIN CALCIUM 40 MG/1
40 TABLET, FILM COATED ORAL DAILY
Qty: 90 TABLET | Refills: 3 | OUTPATIENT
Start: 2018-06-25

## 2018-06-27 ENCOUNTER — OFFICE VISIT (OUTPATIENT)
Dept: ORTHOPEDICS | Facility: CLINIC | Age: 78
End: 2018-06-27
Payer: MEDICARE

## 2018-06-27 VITALS — HEIGHT: 65 IN | WEIGHT: 196.44 LBS | BODY MASS INDEX: 32.73 KG/M2

## 2018-06-27 DIAGNOSIS — M17.0 PRIMARY OSTEOARTHRITIS OF BOTH KNEES: Primary | ICD-10-CM

## 2018-06-27 PROCEDURE — 20610 DRAIN/INJ JOINT/BURSA W/O US: CPT | Mod: 50,S$GLB,, | Performed by: ORTHOPAEDIC SURGERY

## 2018-06-27 PROCEDURE — 99999 PR PBB SHADOW E&M-EST. PATIENT-LVL II: CPT | Mod: PBBFAC,,, | Performed by: ORTHOPAEDIC SURGERY

## 2018-06-27 PROCEDURE — 99499 UNLISTED E&M SERVICE: CPT | Mod: S$GLB,,, | Performed by: ORTHOPAEDIC SURGERY

## 2018-06-27 RX ORDER — AMLODIPINE BESYLATE 10 MG/1
10 TABLET ORAL DAILY
Qty: 90 TABLET | Refills: 3 | Status: SHIPPED | OUTPATIENT
Start: 2018-06-27 | End: 2019-09-12 | Stop reason: SDUPTHER

## 2018-06-30 NOTE — PROCEDURES
Large Joint Aspiration/Injection  Date/Time: 6/27/2018 12:04 AM  Performed by: JOANNA GARCIA  Authorized by: JOANNA GARCIA     Consent Done?:  Yes (Verbal)  Indications:  Pain  Timeout: Prior to procedure the correct patient, procedure, and site was verified      Location:  Knee  Site:  R knee and L knee  Prep: Patient was prepped and draped in usual sterile fashion    Approach:  Anteromedial  Medications:  20 mg sodium hyaluronate (EUFLEXXA) 10 mg/mL(mw 2.4 -3.6 million); 20 mg sodium hyaluronate (EUFLEXXA) 10 mg/mL(mw 2.4 -3.6 million)

## 2018-06-30 NOTE — PROGRESS NOTES
Past Medical History:   Diagnosis Date    Allergy     Arthritis     Carotid artery disease     Coronary artery disease     Diabetes mellitus     Diabetes mellitus, type 2     Fall at home     GERD (gastroesophageal reflux disease)     Headache(784.0)     Hyperlipidemia     Hypertension     Mental disorder     Obesity     Right rib fracture     Snoring     Toe fracture, right        Past Surgical History:   Procedure Laterality Date    CHOLECYSTECTOMY      CORONARY STENT PLACEMENT      x 5    CYST REMOVAL      sebaceous cyst from back    EYE SURGERY      OVARIAN CYST REMOVAL      SINUS SURGERY      TONSILLECTOMY         Current Outpatient Prescriptions   Medication Sig    amLODIPine (NORVASC) 10 MG tablet TAKE 1 TABLET (10 MG TOTAL) BY MOUTH ONCE DAILY.    atorvastatin (LIPITOR) 80 MG tablet Take 1 tablet (80 mg total) by mouth once daily.    baclofen (LIORESAL) 10 MG tablet START WITH 1/2 TABLET EVERY EVENING X 1 WEEK THEN INCREASE TO 1 TABLET EVERY EVENING    calcium-vitamin D (CALCIUM 600 + D,3,) 600 mg(1,500mg) -400 unit Tab Take 1 tablet by mouth 2 (two) times daily.    clonazePAM (KLONOPIN) 0.5 MG tablet Take 1 tablet (0.5 mg total) by mouth 2 (two) times daily as needed for Anxiety.    clopidogrel (PLAVIX) 75 mg tablet TAKE 1 TABLET BY MOUTH EVERY DAY -MUST FOLLOW UP WITH CARDIOLOGIST    esomeprazole (NEXIUM) 20 MG capsule Take 20 mg by mouth once daily. Patient takes OTC nexium    hydrochlorothiazide (HYDRODIURIL) 25 MG tablet TAKE 1 TABLET (25 MG TOTAL) BY MOUTH ONCE DAILY.    ibandronate (BONIVA) 150 mg tablet Take 1 tablet (150 mg total) by mouth every 30 days.    irbesartan (AVAPRO) 300 MG tablet TAKE 1 TABLET BY MOUTH EVERY EVENING.    lancets 33 gauge Misc 1 lancet by Misc.(Non-Drug; Combo Route) route once daily.    levothyroxine (SYNTHROID) 50 MCG tablet TAKE 1 TABLET BY MOUTH EVERY DAY IN THE MORNING ON EMPTY STOMACH    magnesium oxide (MAG-OXIDE) 400 mg tablet  Take 1 tablet (400 mg total) by mouth once daily. Every day    melatonin 3 mg Tab Take 3 mg by mouth nightly.    memantine (NAMENDA) 10 MG Tab Take 1 tablet (10 mg total) by mouth 2 (two) times daily.    metformin (GLUCOPHAGE) 500 MG tablet TAKE 1 TABLET (500 MG TOTAL) BY MOUTH 2 (TWO) TIMES DAILY WITH MEALS.    MULTIVITAMIN ORAL Take 1 Package by mouth once daily. Pt takes Diabetic Vitamin Pack    nitroGLYCERIN (NITROSTAT) 0.4 MG SL tablet Place 1 tablet (0.4 mg total) under the tongue every 5 (five) minutes as needed for Chest pain. As directed PRN    potassium chloride (MICRO-K) 10 MEQ CpSR Take 1 capsule (10 mEq total) by mouth once daily.    rivastigmine tartrate (EXELON) 1.5 MG capsule Take 1 capsule (1.5 mg total) by mouth 2 (two) times daily.    sertraline (ZOLOFT) 50 MG tablet TAKE 1 TABLET BY MOUTH EVERY DAY    vitamin D 1000 units Tab Take 1,000 Units by mouth once daily.      No current facility-administered medications for this visit.        Review of patient's allergies indicates:   Allergen Reactions    Amoxicillin-pot clavulanate      Other reaction(s): CONSTIPATION    Meperidine      Other reaction(s): irritability  Other reaction(s): aggressiveness    Propoxyphene n-acetaminophen      Other reaction(s): irritable  Other reaction(s): aggressive    Sulfa (sulfonamide antibiotics)      Other reaction(s): Rash       Family History   Problem Relation Age of Onset    Heart disease Mother     Diabetes Mother     Cancer Mother         breast cancer    Heart disease Father     Heart disease Sister     Stroke Brother     Dementia Brother     Heart disease Brother        Social History     Social History    Marital status:      Spouse name: N/A    Number of children: N/A    Years of education: N/A     Occupational History    Not on file.     Social History Main Topics    Smoking status: Never Smoker    Smokeless tobacco: Never Used    Alcohol use No    Drug use: No     "Sexual activity: Not Currently     Partners: Male     Other Topics Concern    Not on file     Social History Narrative    No narrative on file       Chief Complaint:   Chief Complaint   Patient presents with    Injections     Euflexxa 1/3 bilateral knee pain       Consulting Physician: Timi Nair MD    History of present illness:    This is a 78 y.o. female who complains of bilateral knee pain for years. Recent fall made worse. Pain 6/10 and worse with use.    Review of Systems:    Constitution: Denies chills, fever, and sweats.  HENT: Denies headaches or blurry vision.  Cardiovascular: Denies chest pain or irregular heart beat.  Respiratory: Denies cough or shortness of breath.  Gastrointestinal: Denies abdominal pain, nausea, or vomiting.  Musculoskeletal:  Denies muscle cramps.  Neurological: Denies dizziness or focal weakness.  Psychiatric/Behavioral: Normal mental status.  Hematologic/Lymphatic: Denies bleeding problem or easy bruising/bleeding.  Skin: Denies rash or suspicious lesions.    Examination:    Vital Signs:    Vitals:    06/27/18 1415   Weight: 89.1 kg (196 lb 6.9 oz)   Height: 5' 5" (1.651 m)   PainSc:   7       Body mass index is 32.69 kg/m².    This a well-developed, well nourished patient in no acute distress.    Alert and oriented x 3 and cooperative to examination.       Physical Exam: Left Knee Exam    Gait   Antalgic    Skin  Rash:   None  Scars:   None    Inspection  Erythema:  None  Bruising:  None  Effusion:  None  Masses:  None  Lymphadenopathy: None    Range of Motion: 0 to 130°    Medial Joint : y  Lateral Joint : n    Patellofemoral Tenderness: Yes  Patellofemoral Crepitus: Yes    Lachman:  Normal  Anterior Drawer: Normal  Posterior Drawer: Normal    Dusty's:  Negative  Apley's:  Negative    Varus Stress:  Stable  Valgus Stress:  Stable    Strength:  5/5    Pulses:  Palpable  Sensation:  Intact      Right Knee " Exam    Gait   Antalgic    Skin  Rash:   None  Scars:   None    Inspection  Erythema:  None  Bruising:  None  Effusion:  None  Masses:  None  Lymphadenopathy: None    Range of Motion: 0 to 130°    Medial Joint : y  Lateral Joint : n    Patellofemoral Tenderness: Yes  Patellofemoral Crepitus: Yes    Lachman:  Normal  Anterior Drawer: Normal  Posterior Drawer: Normal    Dusty's:  Negative  Apley's:  Negative    Varus Stress:  Stable  Valgus Stress:  Stable    Strength:  5/5    Pulses:  Palpable  Sensation:  Intact          Imaging: X-rays of both knee show moderate to severe DJD.        Assessment: Primary osteoarthritis of both knees  -     Large Joint Aspiration/Injection        Plan:   euflexxa.      DISCLAIMER: This note may have been dictated using voice recognition software and may contain grammatical errors.     NOTE: Consult report sent to referring provider via [employer] EMR.

## 2018-07-11 ENCOUNTER — OFFICE VISIT (OUTPATIENT)
Dept: ORTHOPEDICS | Facility: CLINIC | Age: 78
End: 2018-07-11
Payer: MEDICARE

## 2018-07-11 VITALS — BODY MASS INDEX: 32.73 KG/M2 | HEIGHT: 65 IN | WEIGHT: 196.44 LBS

## 2018-07-11 DIAGNOSIS — M17.0 PRIMARY OSTEOARTHRITIS OF BOTH KNEES: Primary | ICD-10-CM

## 2018-07-11 PROCEDURE — 99999 PR PBB SHADOW E&M-EST. PATIENT-LVL II: CPT | Mod: PBBFAC,,, | Performed by: ORTHOPAEDIC SURGERY

## 2018-07-11 PROCEDURE — 99499 UNLISTED E&M SERVICE: CPT | Mod: S$GLB,,, | Performed by: ORTHOPAEDIC SURGERY

## 2018-07-11 PROCEDURE — 20610 DRAIN/INJ JOINT/BURSA W/O US: CPT | Mod: 50,S$GLB,, | Performed by: ORTHOPAEDIC SURGERY

## 2018-07-12 NOTE — PROCEDURES
Large Joint Aspiration/Injection  Date/Time: 7/11/2018 10:28 PM  Performed by: JOANNA GARCIA  Authorized by: JOANNA GARCIA     Consent Done?:  Yes (Verbal)  Indications:  Pain  Timeout: Prior to procedure the correct patient, procedure, and site was verified      Location:  Knee  Site:  R knee and L knee  Prep: Patient was prepped and draped in usual sterile fashion    Approach:  Anteromedial  Medications:  20 mg sodium hyaluronate (EUFLEXXA) 10 mg/mL(mw 2.4 -3.6 million); 20 mg sodium hyaluronate (EUFLEXXA) 10 mg/mL(mw 2.4 -3.6 million)

## 2018-07-12 NOTE — PROGRESS NOTES
Past Medical History:   Diagnosis Date    Allergy     Arthritis     Carotid artery disease     Coronary artery disease     Diabetes mellitus     Diabetes mellitus, type 2     Fall at home     GERD (gastroesophageal reflux disease)     Headache(784.0)     Hyperlipidemia     Hypertension     Mental disorder     Obesity     Right rib fracture     Snoring     Toe fracture, right        Past Surgical History:   Procedure Laterality Date    CHOLECYSTECTOMY      CORONARY STENT PLACEMENT      x 5    CYST REMOVAL      sebaceous cyst from back    EYE SURGERY      OVARIAN CYST REMOVAL      SINUS SURGERY      TONSILLECTOMY         Current Outpatient Prescriptions   Medication Sig    amLODIPine (NORVASC) 10 MG tablet TAKE 1 TABLET (10 MG TOTAL) BY MOUTH ONCE DAILY.    atorvastatin (LIPITOR) 80 MG tablet Take 1 tablet (80 mg total) by mouth once daily.    baclofen (LIORESAL) 10 MG tablet START WITH 1/2 TABLET EVERY EVENING X 1 WEEK THEN INCREASE TO 1 TABLET EVERY EVENING    calcium-vitamin D (CALCIUM 600 + D,3,) 600 mg(1,500mg) -400 unit Tab Take 1 tablet by mouth 2 (two) times daily.    clonazePAM (KLONOPIN) 0.5 MG tablet Take 1 tablet (0.5 mg total) by mouth 2 (two) times daily as needed for Anxiety.    clopidogrel (PLAVIX) 75 mg tablet TAKE 1 TABLET BY MOUTH EVERY DAY -MUST FOLLOW UP WITH CARDIOLOGIST    esomeprazole (NEXIUM) 20 MG capsule Take 20 mg by mouth once daily. Patient takes OTC nexium    hydrochlorothiazide (HYDRODIURIL) 25 MG tablet TAKE 1 TABLET (25 MG TOTAL) BY MOUTH ONCE DAILY.    ibandronate (BONIVA) 150 mg tablet Take 1 tablet (150 mg total) by mouth every 30 days.    irbesartan (AVAPRO) 300 MG tablet TAKE 1 TABLET BY MOUTH EVERY EVENING.    lancets 33 gauge Misc 1 lancet by Misc.(Non-Drug; Combo Route) route once daily.    levothyroxine (SYNTHROID) 50 MCG tablet TAKE 1 TABLET BY MOUTH EVERY DAY IN THE MORNING ON EMPTY STOMACH    magnesium oxide (MAG-OXIDE) 400 mg tablet  Take 1 tablet (400 mg total) by mouth once daily. Every day    melatonin 3 mg Tab Take 3 mg by mouth nightly.    memantine (NAMENDA) 10 MG Tab Take 1 tablet (10 mg total) by mouth 2 (two) times daily.    metformin (GLUCOPHAGE) 500 MG tablet TAKE 1 TABLET (500 MG TOTAL) BY MOUTH 2 (TWO) TIMES DAILY WITH MEALS.    MULTIVITAMIN ORAL Take 1 Package by mouth once daily. Pt takes Diabetic Vitamin Pack    nitroGLYCERIN (NITROSTAT) 0.4 MG SL tablet Place 1 tablet (0.4 mg total) under the tongue every 5 (five) minutes as needed for Chest pain. As directed PRN    potassium chloride (MICRO-K) 10 MEQ CpSR Take 1 capsule (10 mEq total) by mouth once daily.    rivastigmine tartrate (EXELON) 1.5 MG capsule Take 1 capsule (1.5 mg total) by mouth 2 (two) times daily.    sertraline (ZOLOFT) 50 MG tablet TAKE 1 TABLET BY MOUTH EVERY DAY    vitamin D 1000 units Tab Take 1,000 Units by mouth once daily.      No current facility-administered medications for this visit.        Review of patient's allergies indicates:   Allergen Reactions    Amoxicillin-pot clavulanate      Other reaction(s): CONSTIPATION    Meperidine      Other reaction(s): irritability  Other reaction(s): aggressiveness    Propoxyphene n-acetaminophen      Other reaction(s): irritable  Other reaction(s): aggressive    Sulfa (sulfonamide antibiotics)      Other reaction(s): Rash       Family History   Problem Relation Age of Onset    Heart disease Mother     Diabetes Mother     Cancer Mother         breast cancer    Heart disease Father     Heart disease Sister     Stroke Brother     Dementia Brother     Heart disease Brother        Social History     Social History    Marital status:      Spouse name: N/A    Number of children: N/A    Years of education: N/A     Occupational History    Not on file.     Social History Main Topics    Smoking status: Never Smoker    Smokeless tobacco: Never Used    Alcohol use No    Drug use: No     "Sexual activity: Not Currently     Partners: Male     Other Topics Concern    Not on file     Social History Narrative    No narrative on file       Chief Complaint:   Chief Complaint   Patient presents with    Injections     EUFLEXXA 2/3 BILATERAL KNEE       Consulting Physician: No ref. provider found    History of present illness:    This is a 78 y.o. female who complains of bilateral knee pain for years. Recent fall made worse. Pain 6/10 and worse with use.    Review of Systems:    Constitution: Denies chills, fever, and sweats.  HENT: Denies headaches or blurry vision.  Cardiovascular: Denies chest pain or irregular heart beat.  Respiratory: Denies cough or shortness of breath.  Gastrointestinal: Denies abdominal pain, nausea, or vomiting.  Musculoskeletal:  Denies muscle cramps.  Neurological: Denies dizziness or focal weakness.  Psychiatric/Behavioral: Normal mental status.  Hematologic/Lymphatic: Denies bleeding problem or easy bruising/bleeding.  Skin: Denies rash or suspicious lesions.    Examination:    Vital Signs:    Vitals:    07/11/18 1418   Weight: 89.1 kg (196 lb 6.9 oz)   Height: 5' 5" (1.651 m)   PainSc:   6   PainLoc: Knee       Body mass index is 32.69 kg/m².    This a well-developed, well nourished patient in no acute distress.    Alert and oriented x 3 and cooperative to examination.       Physical Exam: Left Knee Exam    Gait   Antalgic    Skin  Rash:   None  Scars:   None    Inspection  Erythema:  None  Bruising:  None  Effusion:  None  Masses:  None  Lymphadenopathy: None    Range of Motion: 0 to 130°    Medial Joint : y  Lateral Joint : n    Patellofemoral Tenderness: Yes  Patellofemoral Crepitus: Yes    Lachman:  Normal  Anterior Drawer: Normal  Posterior Drawer: Normal    Dusty's:  Negative  Apley's:  Negative    Varus Stress:  Stable  Valgus Stress:  Stable    Strength:  5/5    Pulses:  Palpable  Sensation:  Intact      Right Knee " Exam    Gait   Antalgic    Skin  Rash:   None  Scars:   None    Inspection  Erythema:  None  Bruising:  None  Effusion:  None  Masses:  None  Lymphadenopathy: None    Range of Motion: 0 to 130°    Medial Joint : y  Lateral Joint : n    Patellofemoral Tenderness: Yes  Patellofemoral Crepitus: Yes    Lachman:  Normal  Anterior Drawer: Normal  Posterior Drawer: Normal    Dusty's:  Negative  Apley's:  Negative    Varus Stress:  Stable  Valgus Stress:  Stable    Strength:  5/5    Pulses:  Palpable  Sensation:  Intact          Imaging: X-rays of both knee show moderate to severe DJD.        Assessment: Primary osteoarthritis of both knees  -     Large Joint Aspiration/Injection        Plan:   euflexxa.      DISCLAIMER: This note may have been dictated using voice recognition software and may contain grammatical errors.     NOTE: Consult report sent to referring provider via [employer] EMR.

## 2018-07-16 ENCOUNTER — PATIENT MESSAGE (OUTPATIENT)
Dept: NEUROLOGY | Facility: CLINIC | Age: 78
End: 2018-07-16

## 2018-07-18 RX ORDER — CLONAZEPAM 0.5 MG/1
0.5 TABLET ORAL 2 TIMES DAILY PRN
Qty: 30 TABLET | Refills: 3 | Status: SHIPPED | OUTPATIENT
Start: 2018-07-18 | End: 2019-11-14 | Stop reason: CLARIF

## 2018-07-19 ENCOUNTER — PATIENT MESSAGE (OUTPATIENT)
Dept: NEUROLOGY | Facility: CLINIC | Age: 78
End: 2018-07-19

## 2018-07-19 DIAGNOSIS — F03.A0 MILD DEMENTIA: ICD-10-CM

## 2018-07-19 RX ORDER — MEMANTINE HYDROCHLORIDE 10 MG/1
10 TABLET ORAL 2 TIMES DAILY
Qty: 60 TABLET | Refills: 0 | Status: SHIPPED | OUTPATIENT
Start: 2018-07-19 | End: 2018-07-20 | Stop reason: SDUPTHER

## 2018-07-20 RX ORDER — MEMANTINE HYDROCHLORIDE 10 MG/1
10 TABLET ORAL 2 TIMES DAILY
Qty: 60 TABLET | Refills: 1 | Status: SHIPPED | OUTPATIENT
Start: 2018-07-20 | End: 2018-07-23 | Stop reason: SDUPTHER

## 2018-07-23 ENCOUNTER — OFFICE VISIT (OUTPATIENT)
Dept: NEUROLOGY | Facility: CLINIC | Age: 78
End: 2018-07-23
Payer: MEDICARE

## 2018-07-23 VITALS
DIASTOLIC BLOOD PRESSURE: 75 MMHG | BODY MASS INDEX: 32.12 KG/M2 | RESPIRATION RATE: 20 BRPM | HEART RATE: 78 BPM | WEIGHT: 192.81 LBS | HEIGHT: 65 IN | SYSTOLIC BLOOD PRESSURE: 144 MMHG

## 2018-07-23 DIAGNOSIS — F03.90 DEMENTIA WITHOUT BEHAVIORAL DISTURBANCE, UNSPECIFIED DEMENTIA TYPE: ICD-10-CM

## 2018-07-23 PROCEDURE — 99215 OFFICE O/P EST HI 40 MIN: CPT | Mod: S$GLB,,, | Performed by: NURSE PRACTITIONER

## 2018-07-23 PROCEDURE — 3077F SYST BP >= 140 MM HG: CPT | Mod: CPTII,S$GLB,, | Performed by: NURSE PRACTITIONER

## 2018-07-23 PROCEDURE — 99999 PR PBB SHADOW E&M-EST. PATIENT-LVL III: CPT | Mod: PBBFAC,,, | Performed by: NURSE PRACTITIONER

## 2018-07-23 PROCEDURE — 3078F DIAST BP <80 MM HG: CPT | Mod: CPTII,S$GLB,, | Performed by: NURSE PRACTITIONER

## 2018-07-23 RX ORDER — RIVASTIGMINE TARTRATE 1.5 MG/1
3 CAPSULE ORAL 2 TIMES DAILY
Qty: 60 CAPSULE | Refills: 11 | Status: SHIPPED | OUTPATIENT
Start: 2018-07-23 | End: 2018-07-25 | Stop reason: SDUPTHER

## 2018-07-23 RX ORDER — MEMANTINE HYDROCHLORIDE 10 MG/1
10 TABLET ORAL 2 TIMES DAILY
Qty: 60 TABLET | Refills: 11 | Status: SHIPPED | OUTPATIENT
Start: 2018-07-23 | End: 2018-08-22 | Stop reason: SDUPTHER

## 2018-07-23 NOTE — PATIENT INSTRUCTIONS
Continue memantine without change.     Recommend increase rivastigmine as follows...    Week 1- take 3 mg in the morning and 1.5 mg in the evening    Week 2 and after- take 3 mg in the morning and 3 mg in the evening    Recommend considering Life Line or similar type of product.     Call for problems or questions.     Follow up 6 months, unless needed sooner.

## 2018-07-23 NOTE — PROGRESS NOTES
Name: Jerica Rios  MRN: [id number]   [facility]: [id number]      Date: 7-23-18      Referring physician:  No referring provider defined for this encounter.    Subjective:      Chief Complaint: memory    History of Present Illness (HPI):    Jerica Rios is a 78 y.o. right-handed female who presents today for a follow-up evaluation of memory loss and is accompanied by son. Last seen 9-13-17.     She feels her memory is the same.  Son says he continues to see billy of brilliance but also sees gradual progression with short term memroy loss since last visit.     She has had a couple of falls since last visit. She has bad arthritis in knees and R knee tends to lock up, causing her to fall. She is under care of ortho- getting injections. She has not hit head. She has a cane and Rollator.      Son lives with her. Still relatively independent at this point.   Some diff getting up from seated psotion at times. Needs reminders to keep rollator near as when leaves behind she has tendency to fall.    She has essentially stopped cooking on the stove. She uses microwave without trouble.    Does not need reminders to bathe. Has transfer and bench/shower chair.  Still picks out own clothes.   Not choosy with food or clothes.       He does work. He is able to check on her frequently.     Mood, behavior, sleep and appetite all the same.       Review of Systems   Constitutional: Negative for appetite change.   HENT: Negative for trouble swallowing.    Respiratory: Negative for choking.    Gastrointestinal: Negative.    Musculoskeletal: Positive for arthralgias and gait problem.   Neurological: Negative for tremors.        Short term memory loss   Psychiatric/Behavioral: Negative for agitation, hallucinations and sleep disturbance.       Past Medical History: The patient  has a past medical history of Allergy; Arthritis; Carotid artery disease; Coronary artery disease; Diabetes mellitus; Diabetes mellitus, type 2; Fall at  home; GERD (gastroesophageal reflux disease); Headache(784.0); Hyperlipidemia; Hypertension; Mental disorder; Obesity; Right rib fracture; Snoring; and Toe fracture, right.    Social History: The patient  reports that she has never smoked. She has never used smokeless tobacco. She reports that she does not drink alcohol or use drugs.    Family History: Their family history includes Cancer in her mother; Dementia in her brother; Diabetes in her mother; Heart disease in her brother, father, mother, and sister; Stroke in her brother.    Allergies: Amoxicillin-pot clavulanate; Meperidine; Propoxyphene n-acetaminophen; and Sulfa (sulfonamide antibiotics)     Meds:   Current Outpatient Prescriptions on File Prior to Visit   Medication Sig Dispense Refill    amLODIPine (NORVASC) 10 MG tablet TAKE 1 TABLET (10 MG TOTAL) BY MOUTH ONCE DAILY. 90 tablet 3    atorvastatin (LIPITOR) 80 MG tablet Take 1 tablet (80 mg total) by mouth once daily. 90 tablet 3    baclofen (LIORESAL) 10 MG tablet START WITH 1/2 TABLET EVERY EVENING X 1 WEEK THEN INCREASE TO 1 TABLET EVERY EVENING 90 tablet 0    calcium-vitamin D (CALCIUM 600 + D,3,) 600 mg(1,500mg) -400 unit Tab Take 1 tablet by mouth 2 (two) times daily.      clonazePAM (KLONOPIN) 0.5 MG tablet Take 1 tablet (0.5 mg total) by mouth 2 (two) times daily as needed for Anxiety. 30 tablet 3    clopidogrel (PLAVIX) 75 mg tablet TAKE 1 TABLET BY MOUTH EVERY DAY -MUST FOLLOW UP WITH CARDIOLOGIST 30 tablet 11    esomeprazole (NEXIUM) 20 MG capsule Take 20 mg by mouth once daily. Patient takes OTC nexium      hydrochlorothiazide (HYDRODIURIL) 25 MG tablet TAKE 1 TABLET (25 MG TOTAL) BY MOUTH ONCE DAILY. 90 tablet 3    ibandronate (BONIVA) 150 mg tablet Take 1 tablet (150 mg total) by mouth every 30 days. 1 tablet 11    irbesartan (AVAPRO) 300 MG tablet TAKE 1 TABLET BY MOUTH EVERY EVENING. 90 tablet 2    lancets 33 gauge Misc 1 lancet by Misc.(Non-Drug; Combo Route) route once  "daily. 100 each 3    levothyroxine (SYNTHROID) 50 MCG tablet TAKE 1 TABLET BY MOUTH EVERY DAY IN THE MORNING ON EMPTY STOMACH 90 tablet 3    magnesium oxide (MAG-OXIDE) 400 mg tablet Take 1 tablet (400 mg total) by mouth once daily. Every day 90 tablet 3    melatonin 3 mg Tab Take 3 mg by mouth nightly.      metformin (GLUCOPHAGE) 500 MG tablet TAKE 1 TABLET (500 MG TOTAL) BY MOUTH 2 (TWO) TIMES DAILY WITH MEALS. 180 tablet 3    MULTIVITAMIN ORAL Take 1 Package by mouth once daily. Pt takes Diabetic Vitamin Pack      nitroGLYCERIN (NITROSTAT) 0.4 MG SL tablet Place 1 tablet (0.4 mg total) under the tongue every 5 (five) minutes as needed for Chest pain. As directed  tablet 3    potassium chloride (MICRO-K) 10 MEQ CpSR Take 1 capsule (10 mEq total) by mouth once daily. 90 capsule 3    sertraline (ZOLOFT) 50 MG tablet TAKE 1 TABLET BY MOUTH EVERY DAY 30 tablet 6    vitamin D 1000 units Tab Take 1,000 Units by mouth once daily.       [DISCONTINUED] memantine (NAMENDA) 10 MG Tab Take 1 tablet (10 mg total) by mouth 2 (two) times daily. 60 tablet 1    [DISCONTINUED] rivastigmine tartrate (EXELON) 1.5 MG capsule Take 1 capsule (1.5 mg total) by mouth 2 (two) times daily. 60 capsule 11    [DISCONTINUED] ranitidine (ZANTAC) 150 MG tablet Take 1 tablet (150 mg total) by mouth 2 (two) times daily. 60 tablet 11     No current facility-administered medications on file prior to visit.        Objective:     Physical Exam:    Vitals:    07/23/18 1110   BP: (!) 144/75   BP Location: Right arm   Patient Position: Sitting   BP Method: Medium (Automatic)   Pulse: 78   Resp: 20   Weight: 87.5 kg (192 lb 12.8 oz)   Height: 5' 5" (1.651 m)     Body mass index is 32.08 kg/m².    Constitutional  Well-developed, well-nourished, appears stated age   Cardiovascular  Radial pulses 2+ and symmetric, no LE edema bilaterally     ..  * Cranial nerves       - CN II  PERRL, visual fields full to confrontation     - CN III, IV, VI  " Extraocular movements full, normal pursuits and saccades     - CN V  Sensation V1 - V3 intact     - CN VII  Face strong and symmetric bilaterally     - CN VIII  Hearing intact bilaterally     - CN IX, X  Palate raises midline and symmetric     - CN XI  SCM and trapezius 5/5 bilaterally     - CN XII  Tongue midline       Looks at magazine most of the visit during HPI. Will occ add to history given by son. During MOCA, will look to him periodically during MOCA. He does not assist.   Awake, alert, pleasant.   Follows commands.   Able to quickly and correctly respond to emergency scenarios (call 911).          Laboratory Results:  Lab Visit on 05/23/2018   Component Date Value Ref Range Status    Sodium 05/23/2018 140  136 - 145 mmol/L Final    Potassium 05/23/2018 3.7  3.5 - 5.1 mmol/L Final    Chloride 05/23/2018 102  95 - 110 mmol/L Final    CO2 05/23/2018 27  23 - 29 mmol/L Final    Glucose 05/23/2018 99  70 - 110 mg/dL Final    BUN, Bld 05/23/2018 17  8 - 23 mg/dL Final    Creatinine 05/23/2018 1.1  0.5 - 1.4 mg/dL Final    Calcium 05/23/2018 11.0* 8.7 - 10.5 mg/dL Final    Total Protein 05/23/2018 7.7  6.0 - 8.4 g/dL Final    Albumin 05/23/2018 4.7  3.5 - 5.2 g/dL Final    Total Bilirubin 05/23/2018 0.5  0.1 - 1.0 mg/dL Final    Alkaline Phosphatase 05/23/2018 143* 55 - 135 U/L Final    AST 05/23/2018 20  10 - 40 U/L Final    ALT 05/23/2018 28  10 - 44 U/L Final    Anion Gap 05/23/2018 11  8 - 16 mmol/L Final    eGFR if  05/23/2018 56* >60 mL/min/1.73 m^2 Final    eGFR if non  05/23/2018 48* >60 mL/min/1.73 m^2 Final    WBC 05/23/2018 12.00  3.90 - 12.70 K/uL Final    RBC 05/23/2018 4.51  4.00 - 5.40 M/uL Final    Hemoglobin 05/23/2018 13.0  12.0 - 16.0 g/dL Final    Hematocrit 05/23/2018 38.2  37.0 - 48.5 % Final    MCV 05/23/2018 85  82 - 98 fL Final    MCH 05/23/2018 28.8  27.0 - 31.0 pg Final    MCHC 05/23/2018 34.0  32.0 - 36.0 g/dL Final    RDW  05/23/2018 15.7* 11.5 - 14.5 % Final    Platelets 05/23/2018 407* 150 - 350 K/uL Final    MPV 05/23/2018 8.6* 9.2 - 12.9 fL Final    Gran # (ANC) 05/23/2018 8.4* 1.8 - 7.7 K/uL Final    Lymph # 05/23/2018 2.3  1.0 - 4.8 K/uL Final    Mono # 05/23/2018 0.9  0.3 - 1.0 K/uL Final    Eos # 05/23/2018 0.3  0.0 - 0.5 K/uL Final    Baso # 05/23/2018 0.00  0.00 - 0.20 K/uL Final    Gran% 05/23/2018 70.1  38.0 - 73.0 % Final    Lymph% 05/23/2018 19.5  18.0 - 48.0 % Final    Mono% 05/23/2018 7.8  4.0 - 15.0 % Final    Eosinophil% 05/23/2018 2.2  0.0 - 8.0 % Final    Basophil% 05/23/2018 0.4  0.0 - 1.9 % Final    Differential Method 05/23/2018 Automated   Final    TSH 05/23/2018 1.398  0.400 - 4.000 uIU/mL Final    Hemoglobin A1C 05/23/2018 5.5  4.0 - 5.6 % Final    Estimated Avg Glucose 05/23/2018 111  68 - 131 mg/dL Final         Imaging:   reviewed from CT head 2-2016           ________________________________________________  CT head 2017:          Impression       1.  No acute intracranial abnormality is seen.  2. Stable mild chronic cerebral atrophy and supratentorial nonspecific white matter disease.  3. Small volume of right sphenoid sinus fluid. Consider mild sinusitis.      In agreement with the preliminary [facility] report.             Electronically signed by: Cecilia Carcamo MD  Date: 06/22/17           Assessment and Plan     Dementia without behavioral disturbance, unspecified dementia type  -     memantine (NAMENDA) 10 MG Tab; Take 1 tablet (10 mg total) by mouth 2 (two) times daily.  Dispense: 60 tablet; Refill: 11  -     rivastigmine tartrate (EXELON) 1.5 MG capsule; Take 2 capsules (3 mg total) by mouth 2 (two) times daily.  Dispense: 60 capsule; Refill: 11        Medical Decision Making:  Decline in MOCA today. Discussed with them.   Continue memantine 10 mg BID.  Increase rivastigmine from 1.5 mg BID to 3 mg BID as instructed.   Rec. Life Alert of similar type of product.   Call for problems.    Discussed safety. At this point, she does not go outside of the house or wander. She remains independent with ADLs.   She does not drive.   Follow up 6 months in [place].       I spent 50 minutes face-to-face with the patient and son with >50% of the time spent with counseling and education regarding:  - results of data, diagnosis, and recommendations stated above  - the prognosis of dementia  - risks and benefits of rivastigmine and memantine  - importance of diet and exercise    Lesly Humphreys DNP, NP-C  Division of Movement and Memory Disorders  [facility]  [phone]

## 2018-07-25 ENCOUNTER — TELEPHONE (OUTPATIENT)
Dept: NEUROLOGY | Facility: CLINIC | Age: 78
End: 2018-07-25

## 2018-07-25 ENCOUNTER — OFFICE VISIT (OUTPATIENT)
Dept: ORTHOPEDICS | Facility: CLINIC | Age: 78
End: 2018-07-25
Payer: MEDICARE

## 2018-07-25 ENCOUNTER — PATIENT MESSAGE (OUTPATIENT)
Dept: NEUROLOGY | Facility: CLINIC | Age: 78
End: 2018-07-25

## 2018-07-25 VITALS — BODY MASS INDEX: 32.14 KG/M2 | WEIGHT: 192.88 LBS | HEIGHT: 65 IN

## 2018-07-25 DIAGNOSIS — F03.90 DEMENTIA WITHOUT BEHAVIORAL DISTURBANCE, UNSPECIFIED DEMENTIA TYPE: ICD-10-CM

## 2018-07-25 DIAGNOSIS — M17.0 PRIMARY OSTEOARTHRITIS OF BOTH KNEES: Primary | ICD-10-CM

## 2018-07-25 PROCEDURE — 99999 PR PBB SHADOW E&M-EST. PATIENT-LVL II: CPT | Mod: PBBFAC,,, | Performed by: ORTHOPAEDIC SURGERY

## 2018-07-25 PROCEDURE — 99499 UNLISTED E&M SERVICE: CPT | Mod: S$GLB,,, | Performed by: ORTHOPAEDIC SURGERY

## 2018-07-25 PROCEDURE — 20610 DRAIN/INJ JOINT/BURSA W/O US: CPT | Mod: 50,S$GLB,, | Performed by: ORTHOPAEDIC SURGERY

## 2018-07-25 RX ORDER — RIVASTIGMINE TARTRATE 3 MG/1
3 CAPSULE ORAL 2 TIMES DAILY
Qty: 180 CAPSULE | Refills: 3 | Status: SHIPPED | OUTPATIENT
Start: 2018-07-25 | End: 2019-10-28 | Stop reason: SDUPTHER

## 2018-07-25 NOTE — TELEPHONE ENCOUNTER
----- Message from Shelli Womack sent at 7/25/2018  3:05 PM CDT -----  Contact: sonMatthew  Type: Needs Medical Advice    Who Called: Patient's Son  Pharmacy name and phone #:    [id number] [place] - [address]  [address] [address]  Phone: [phone] Fax: [phone]      Best Call Back Number: [phone]  Additional Information: rivastigmine tartrate (EXELON) 1.5 MG capsule 60 capsule    Sig - Route: Take 2 capsules (3 mg total) by mouth 2 (two) times daily. - Oral     Please contact son regarding this prescription, he would like to have the patient take one 3 mg tablet twice daily instead of two 1.5 mg tablets twice daily. Please contact son regarding matter on his mobile number [phone].

## 2018-07-25 NOTE — PROCEDURES
Large Joint Aspiration/Injection  Date/Time: 7/25/2018 4:47 PM  Performed by: JOANNA GARCIA  Authorized by: JOANNA GARCIA     Consent Done?:  Yes (Verbal)  Indications:  Pain  Timeout: Prior to procedure the correct patient, procedure, and site was verified      Location:  Knee  Site:  R knee and L knee  Prep: Patient was prepped and draped in usual sterile fashion    Approach:  Anteromedial  Medications:  20 mg sodium hyaluronate (EUFLEXXA) 10 mg/mL(mw 2.4 -3.6 million); 20 mg sodium hyaluronate (EUFLEXXA) 10 mg/mL(mw 2.4 -3.6 million)

## 2018-07-25 NOTE — PROGRESS NOTES
Past Medical History:   Diagnosis Date    Allergy     Arthritis     Carotid artery disease     Coronary artery disease     Diabetes mellitus     Diabetes mellitus, type 2     Fall at home     GERD (gastroesophageal reflux disease)     Headache(784.0)     Hyperlipidemia     Hypertension     Mental disorder     Obesity     Right rib fracture     Snoring     Toe fracture, right        Past Surgical History:   Procedure Laterality Date    CHOLECYSTECTOMY      CORONARY STENT PLACEMENT      x 5    CYST REMOVAL      sebaceous cyst from back    EYE SURGERY      OVARIAN CYST REMOVAL      SINUS SURGERY      TONSILLECTOMY         Current Outpatient Prescriptions   Medication Sig    amLODIPine (NORVASC) 10 MG tablet TAKE 1 TABLET (10 MG TOTAL) BY MOUTH ONCE DAILY.    atorvastatin (LIPITOR) 80 MG tablet Take 1 tablet (80 mg total) by mouth once daily.    baclofen (LIORESAL) 10 MG tablet START WITH 1/2 TABLET EVERY EVENING X 1 WEEK THEN INCREASE TO 1 TABLET EVERY EVENING    calcium-vitamin D (CALCIUM 600 + D,3,) 600 mg(1,500mg) -400 unit Tab Take 1 tablet by mouth 2 (two) times daily.    clonazePAM (KLONOPIN) 0.5 MG tablet Take 1 tablet (0.5 mg total) by mouth 2 (two) times daily as needed for Anxiety.    clopidogrel (PLAVIX) 75 mg tablet TAKE 1 TABLET BY MOUTH EVERY DAY -MUST FOLLOW UP WITH CARDIOLOGIST    esomeprazole (NEXIUM) 20 MG capsule Take 20 mg by mouth once daily. Patient takes OTC nexium    hydrochlorothiazide (HYDRODIURIL) 25 MG tablet TAKE 1 TABLET (25 MG TOTAL) BY MOUTH ONCE DAILY.    ibandronate (BONIVA) 150 mg tablet Take 1 tablet (150 mg total) by mouth every 30 days.    irbesartan (AVAPRO) 300 MG tablet TAKE 1 TABLET BY MOUTH EVERY EVENING.    lancets 33 gauge Misc 1 lancet by Misc.(Non-Drug; Combo Route) route once daily.    levothyroxine (SYNTHROID) 50 MCG tablet TAKE 1 TABLET BY MOUTH EVERY DAY IN THE MORNING ON EMPTY STOMACH    magnesium oxide (MAG-OXIDE) 400 mg tablet  Take 1 tablet (400 mg total) by mouth once daily. Every day    melatonin 3 mg Tab Take 3 mg by mouth nightly.    memantine (NAMENDA) 10 MG Tab Take 1 tablet (10 mg total) by mouth 2 (two) times daily.    metformin (GLUCOPHAGE) 500 MG tablet TAKE 1 TABLET (500 MG TOTAL) BY MOUTH 2 (TWO) TIMES DAILY WITH MEALS.    MULTIVITAMIN ORAL Take 1 Package by mouth once daily. Pt takes Diabetic Vitamin Pack    nitroGLYCERIN (NITROSTAT) 0.4 MG SL tablet Place 1 tablet (0.4 mg total) under the tongue every 5 (five) minutes as needed for Chest pain. As directed PRN    potassium chloride (MICRO-K) 10 MEQ CpSR Take 1 capsule (10 mEq total) by mouth once daily.    rivastigmine tartrate (EXELON) 1.5 MG capsule Take 2 capsules (3 mg total) by mouth 2 (two) times daily.    sertraline (ZOLOFT) 50 MG tablet TAKE 1 TABLET BY MOUTH EVERY DAY    vitamin D 1000 units Tab Take 1,000 Units by mouth once daily.      No current facility-administered medications for this visit.        Review of patient's allergies indicates:   Allergen Reactions    Amoxicillin-pot clavulanate      Other reaction(s): CONSTIPATION    Meperidine      Other reaction(s): irritability  Other reaction(s): aggressiveness    Propoxyphene n-acetaminophen      Other reaction(s): irritable  Other reaction(s): aggressive    Sulfa (sulfonamide antibiotics)      Other reaction(s): Rash       Family History   Problem Relation Age of Onset    Heart disease Mother     Diabetes Mother     Cancer Mother         breast cancer    Heart disease Father     Heart disease Sister     Stroke Brother     Dementia Brother     Heart disease Brother        Social History     Social History    Marital status:      Spouse name: N/A    Number of children: N/A    Years of education: N/A     Occupational History    Not on file.     Social History Main Topics    Smoking status: Never Smoker    Smokeless tobacco: Never Used    Alcohol use No    Drug use: No     "Sexual activity: Not Currently     Partners: Male     Other Topics Concern    Not on file     Social History Narrative    No narrative on file       Chief Complaint:   Chief Complaint   Patient presents with    Injections     EUFLEXXA 3/3 BILATERAL KNEE        Consulting Physician: No ref. provider found    History of present illness:    This is a 78 y.o. female who complains of bilateral knee pain for years. Recent fall made worse. Pain 6/10 and worse with use.    Review of Systems:    Constitution: Denies chills, fever, and sweats.  HENT: Denies headaches or blurry vision.  Cardiovascular: Denies chest pain or irregular heart beat.  Respiratory: Denies cough or shortness of breath.  Gastrointestinal: Denies abdominal pain, nausea, or vomiting.  Musculoskeletal:  Denies muscle cramps.  Neurological: Denies dizziness or focal weakness.  Psychiatric/Behavioral: Normal mental status.  Hematologic/Lymphatic: Denies bleeding problem or easy bruising/bleeding.  Skin: Denies rash or suspicious lesions.    Examination:    Vital Signs:    Vitals:    07/25/18 1411   Weight: 87.5 kg (192 lb 14.4 oz)   Height: 5' 5" (1.651 m)   PainSc:   7   PainLoc: Knee       Body mass index is 32.1 kg/m².    This a well-developed, well nourished patient in no acute distress.    Alert and oriented x 3 and cooperative to examination.       Physical Exam: Left Knee Exam    Gait   Antalgic    Skin  Rash:   None  Scars:   None    Inspection  Erythema:  None  Bruising:  None  Effusion:  None  Masses:  None  Lymphadenopathy: None    Range of Motion: 0 to 130°    Medial Joint : y  Lateral Joint : n    Patellofemoral Tenderness: Yes  Patellofemoral Crepitus: Yes    Lachman:  Normal  Anterior Drawer: Normal  Posterior Drawer: Normal    Dusty's:  Negative  Apley's:  Negative    Varus Stress:  Stable  Valgus Stress:  Stable    Strength:  5/5    Pulses:  Palpable  Sensation:  Intact      Right Knee " Exam    Gait   Antalgic    Skin  Rash:   None  Scars:   None    Inspection  Erythema:  None  Bruising:  None  Effusion:  None  Masses:  None  Lymphadenopathy: None    Range of Motion: 0 to 130°    Medial Joint : y  Lateral Joint : n    Patellofemoral Tenderness: Yes  Patellofemoral Crepitus: Yes    Lachman:  Normal  Anterior Drawer: Normal  Posterior Drawer: Normal    Dusty's:  Negative  Apley's:  Negative    Varus Stress:  Stable  Valgus Stress:  Stable    Strength:  5/5    Pulses:  Palpable  Sensation:  Intact          Imaging: X-rays of both knee show moderate to severe DJD.        Assessment: Primary osteoarthritis of both knees  -     Large Joint Aspiration/Injection        Plan:   euflexxa.      DISCLAIMER: This note may have been dictated using voice recognition software and may contain grammatical errors.     NOTE: Consult report sent to referring provider via [facility].

## 2018-07-25 NOTE — TELEPHONE ENCOUNTER
----- Message from Daxa Miller RN sent at 7/25/2018  4:52 PM CDT -----  Contact: Matthew Rios pt's son    Type: Needs Medical Advice     Who Called: Patient's Son   Pharmacy name and phone #:     [id number] [place] - [address]   [address] [address]   Phone: [phone] Fax: [phone]       Best Call Back Number: [phone]   Additional Information: rivastigmine tartrate (EXELON) 1.5 MG capsule 60 capsule   Sig - Route: Take 2 capsules (3 mg total) by mouth 2 (two) times daily. - Oral     Please contact son regarding this prescription, he would like to have the patient take one 3 mg tablet twice daily instead of two 1.5 mg tablets twice daily. Please contact son regarding matter on his mobile number [phone].

## 2018-07-26 NOTE — TELEPHONE ENCOUNTER
Returned call to pt son; left voicemail informing that rivastigmine has been changed to reflect patient taking as follows:1  3mg tablet BID. Asked to return call if any questions

## 2018-08-04 ENCOUNTER — TELEPHONE (OUTPATIENT)
Dept: FAMILY MEDICINE | Facility: CLINIC | Age: 78
End: 2018-08-04

## 2018-08-04 DIAGNOSIS — E83.52 HYPERCALCEMIA: Primary | ICD-10-CM

## 2018-08-04 DIAGNOSIS — D72.9 NEUTROPHILIA: ICD-10-CM

## 2018-08-04 NOTE — TELEPHONE ENCOUNTER
Calcium is elevated needs repeat cmp and ionized calcium. Also one of er white blood cell counts was slightly elevated. Needs repeat cbc at same time.

## 2018-08-06 NOTE — TELEPHONE ENCOUNTER
Patient son notified and states understanding lab appointment scheduled.  Wanted to notified that patient is taking calcium supplement 1200 mg daily

## 2018-08-08 ENCOUNTER — PATIENT MESSAGE (OUTPATIENT)
Dept: FAMILY MEDICINE | Facility: CLINIC | Age: 78
End: 2018-08-08

## 2018-08-15 ENCOUNTER — LAB VISIT (OUTPATIENT)
Dept: LAB | Facility: HOSPITAL | Age: 78
End: 2018-08-15
Attending: FAMILY MEDICINE
Payer: MEDICARE

## 2018-08-15 DIAGNOSIS — D72.9 NEUTROPHILIA: ICD-10-CM

## 2018-08-15 DIAGNOSIS — E83.52 HYPERCALCEMIA: ICD-10-CM

## 2018-08-15 LAB
ALBUMIN SERPL BCP-MCNC: 4.6 G/DL
ALP SERPL-CCNC: 132 U/L
ALT SERPL W/O P-5'-P-CCNC: 18 U/L
ANION GAP SERPL CALC-SCNC: 11 MMOL/L
AST SERPL-CCNC: 16 U/L
BASOPHILS # BLD AUTO: 0.04 K/UL
BASOPHILS NFR BLD: 0.4 %
BILIRUB SERPL-MCNC: 0.6 MG/DL
BUN SERPL-MCNC: 13 MG/DL
CA-I BLDV-SCNC: 1.37 MMOL/L
CALCIUM SERPL-MCNC: 11.2 MG/DL
CHLORIDE SERPL-SCNC: 102 MMOL/L
CO2 SERPL-SCNC: 28 MMOL/L
CREAT SERPL-MCNC: 1 MG/DL
DIFFERENTIAL METHOD: ABNORMAL
EOSINOPHIL # BLD AUTO: 0.2 K/UL
EOSINOPHIL NFR BLD: 1.8 %
ERYTHROCYTE [DISTWIDTH] IN BLOOD BY AUTOMATED COUNT: 16.1 %
EST. GFR  (AFRICAN AMERICAN): >60 ML/MIN/1.73 M^2
EST. GFR  (NON AFRICAN AMERICAN): 54.1 ML/MIN/1.73 M^2
GLUCOSE SERPL-MCNC: 81 MG/DL
HCT VFR BLD AUTO: 40.2 %
HGB BLD-MCNC: 12.6 G/DL
IMM GRANULOCYTES # BLD AUTO: 0.03 K/UL
IMM GRANULOCYTES NFR BLD AUTO: 0.3 %
LYMPHOCYTES # BLD AUTO: 2.5 K/UL
LYMPHOCYTES NFR BLD: 22.9 %
MCH RBC QN AUTO: 26.9 PG
MCHC RBC AUTO-ENTMCNC: 31.3 G/DL
MCV RBC AUTO: 86 FL
MONOCYTES # BLD AUTO: 1 K/UL
MONOCYTES NFR BLD: 8.9 %
NEUTROPHILS # BLD AUTO: 7.1 K/UL
NEUTROPHILS NFR BLD: 65.7 %
NRBC BLD-RTO: 0 /100 WBC
PLATELET # BLD AUTO: 394 K/UL
PMV BLD AUTO: 10.7 FL
POTASSIUM SERPL-SCNC: 4.5 MMOL/L
PROT SERPL-MCNC: 7.8 G/DL
RBC # BLD AUTO: 4.68 M/UL
SODIUM SERPL-SCNC: 141 MMOL/L
WBC # BLD AUTO: 10.8 K/UL

## 2018-08-15 PROCEDURE — 85025 COMPLETE CBC W/AUTO DIFF WBC: CPT

## 2018-08-15 PROCEDURE — 80053 COMPREHEN METABOLIC PANEL: CPT

## 2018-08-15 PROCEDURE — 82330 ASSAY OF CALCIUM: CPT

## 2018-08-15 PROCEDURE — 36415 COLL VENOUS BLD VENIPUNCTURE: CPT | Mod: PO

## 2018-08-16 ENCOUNTER — TELEPHONE (OUTPATIENT)
Dept: FAMILY MEDICINE | Facility: CLINIC | Age: 78
End: 2018-08-16

## 2018-08-16 ENCOUNTER — PATIENT MESSAGE (OUTPATIENT)
Dept: FAMILY MEDICINE | Facility: CLINIC | Age: 78
End: 2018-08-16

## 2018-08-16 DIAGNOSIS — E83.52 HYPERCALCEMIA: Primary | ICD-10-CM

## 2018-08-16 NOTE — TELEPHONE ENCOUNTER
Repeat neutrophil count normalized. Her calcium is even higher. She needs to stop all calcium supplementation and have repeat CMP and ionized calcium again in 3 weeks.

## 2018-08-16 NOTE — TELEPHONE ENCOUNTER
Spoke with patient and patient confirmed understanding of results. Scheduled appointment for lab work on 9/12.

## 2018-08-18 RX ORDER — BACLOFEN 10 MG/1
TABLET ORAL
Qty: 90 TABLET | Refills: 0 | Status: SHIPPED | OUTPATIENT
Start: 2018-08-18 | End: 2018-11-13 | Stop reason: SDUPTHER

## 2018-08-22 DIAGNOSIS — F03.90 DEMENTIA WITHOUT BEHAVIORAL DISTURBANCE, UNSPECIFIED DEMENTIA TYPE: ICD-10-CM

## 2018-08-22 RX ORDER — MEMANTINE HYDROCHLORIDE 10 MG/1
10 TABLET ORAL 2 TIMES DAILY
Qty: 60 TABLET | Refills: 11 | Status: SHIPPED | OUTPATIENT
Start: 2018-08-22 | End: 2019-11-18 | Stop reason: SDUPTHER

## 2018-09-12 ENCOUNTER — LAB VISIT (OUTPATIENT)
Dept: LAB | Facility: HOSPITAL | Age: 78
End: 2018-09-12
Attending: FAMILY MEDICINE
Payer: MEDICARE

## 2018-09-12 DIAGNOSIS — E83.52 HYPERCALCEMIA: ICD-10-CM

## 2018-09-12 LAB
ALBUMIN SERPL BCP-MCNC: 4.4 G/DL
ALP SERPL-CCNC: 127 U/L
ALT SERPL W/O P-5'-P-CCNC: 21 U/L
ANION GAP SERPL CALC-SCNC: 11 MMOL/L
AST SERPL-CCNC: 16 U/L
BILIRUB SERPL-MCNC: 0.5 MG/DL
BUN SERPL-MCNC: 11 MG/DL
CA-I BLDV-SCNC: 1.24 MMOL/L
CALCIUM SERPL-MCNC: 10.3 MG/DL
CHLORIDE SERPL-SCNC: 101 MMOL/L
CO2 SERPL-SCNC: 26 MMOL/L
CREAT SERPL-MCNC: 0.9 MG/DL
EST. GFR  (AFRICAN AMERICAN): >60 ML/MIN/1.73 M^2
EST. GFR  (NON AFRICAN AMERICAN): >60 ML/MIN/1.73 M^2
GLUCOSE SERPL-MCNC: 129 MG/DL
POTASSIUM SERPL-SCNC: 4.2 MMOL/L
PROT SERPL-MCNC: 7.4 G/DL
SODIUM SERPL-SCNC: 138 MMOL/L

## 2018-09-12 PROCEDURE — 82330 ASSAY OF CALCIUM: CPT

## 2018-09-12 PROCEDURE — 80053 COMPREHEN METABOLIC PANEL: CPT

## 2018-09-12 PROCEDURE — 36415 COLL VENOUS BLD VENIPUNCTURE: CPT | Mod: PO

## 2018-09-19 RX ORDER — HYDROCHLOROTHIAZIDE 25 MG/1
25 TABLET ORAL DAILY
Qty: 90 TABLET | Refills: 3 | Status: SHIPPED | OUTPATIENT
Start: 2018-09-19 | End: 2019-09-04 | Stop reason: SDUPTHER

## 2018-09-21 RX ORDER — IRBESARTAN 300 MG/1
TABLET ORAL
Qty: 90 TABLET | Refills: 2 | Status: SHIPPED | OUTPATIENT
Start: 2018-09-21 | End: 2019-06-12 | Stop reason: SDUPTHER

## 2018-09-23 DIAGNOSIS — E11.9 TYPE 2 DIABETES MELLITUS WITHOUT COMPLICATION: ICD-10-CM

## 2018-09-23 RX ORDER — METFORMIN HYDROCHLORIDE 500 MG/1
TABLET ORAL
Qty: 180 TABLET | Refills: 3 | Status: SHIPPED | OUTPATIENT
Start: 2018-09-23 | End: 2019-09-11 | Stop reason: SDUPTHER

## 2018-10-01 RX ORDER — IBANDRONATE SODIUM 150 MG/1
150 TABLET, FILM COATED ORAL
Qty: 1 TABLET | Refills: 11 | Status: SHIPPED | OUTPATIENT
Start: 2018-10-01 | End: 2019-09-18 | Stop reason: SDUPTHER

## 2018-11-06 ENCOUNTER — TELEPHONE (OUTPATIENT)
Dept: FAMILY MEDICINE | Facility: CLINIC | Age: 78
End: 2018-11-06

## 2018-11-06 RX ORDER — SERTRALINE HYDROCHLORIDE 50 MG/1
TABLET, FILM COATED ORAL
Qty: 30 TABLET | Refills: 2 | Status: SHIPPED | OUTPATIENT
Start: 2018-11-06 | End: 2019-01-26 | Stop reason: SDUPTHER

## 2018-11-06 NOTE — TELEPHONE ENCOUNTER
Called son, Dr. Ramírez is not going to be in the office the afternoon of 11/28/18. Pt has an appt scheduled at 3 pm that day for her 6 month follow up. Rescheduled her to see Ms. Asia PA-C for the same date/time. Thanks, oNra

## 2018-11-09 RX ORDER — ATORVASTATIN CALCIUM 80 MG/1
80 TABLET, FILM COATED ORAL DAILY
Qty: 90 TABLET | Refills: 3 | Status: SHIPPED | OUTPATIENT
Start: 2018-11-09 | End: 2019-10-25 | Stop reason: SDUPTHER

## 2018-11-13 RX ORDER — BACLOFEN 10 MG/1
TABLET ORAL
Qty: 90 TABLET | Refills: 0 | Status: SHIPPED | OUTPATIENT
Start: 2018-11-13 | End: 2019-02-10 | Stop reason: SDUPTHER

## 2018-11-26 ENCOUNTER — TELEPHONE (OUTPATIENT)
Dept: FAMILY MEDICINE | Facility: CLINIC | Age: 78
End: 2018-11-26

## 2018-11-28 ENCOUNTER — OFFICE VISIT (OUTPATIENT)
Dept: FAMILY MEDICINE | Facility: CLINIC | Age: 78
End: 2018-11-28
Payer: MEDICARE

## 2018-11-28 ENCOUNTER — DOCUMENTATION ONLY (OUTPATIENT)
Dept: FAMILY MEDICINE | Facility: CLINIC | Age: 78
End: 2018-11-28

## 2018-11-28 VITALS
HEART RATE: 85 BPM | SYSTOLIC BLOOD PRESSURE: 124 MMHG | BODY MASS INDEX: 33.72 KG/M2 | HEIGHT: 65 IN | DIASTOLIC BLOOD PRESSURE: 65 MMHG | WEIGHT: 202.38 LBS | TEMPERATURE: 99 F

## 2018-11-28 DIAGNOSIS — E03.9 HYPOTHYROIDISM, UNSPECIFIED TYPE: ICD-10-CM

## 2018-11-28 DIAGNOSIS — E78.5 HYPERLIPIDEMIA, UNSPECIFIED HYPERLIPIDEMIA TYPE: ICD-10-CM

## 2018-11-28 DIAGNOSIS — E11.59 HYPERTENSION ASSOCIATED WITH DIABETES: Primary | ICD-10-CM

## 2018-11-28 DIAGNOSIS — I15.2 HYPERTENSION ASSOCIATED WITH DIABETES: Primary | ICD-10-CM

## 2018-11-28 DIAGNOSIS — M51.36 DDD (DEGENERATIVE DISC DISEASE), LUMBAR: ICD-10-CM

## 2018-11-28 DIAGNOSIS — E66.9 OBESITY, CLASS I, BMI 30-34.9: ICD-10-CM

## 2018-11-28 PROCEDURE — 3078F DIAST BP <80 MM HG: CPT | Mod: CPTII,S$GLB,, | Performed by: PHYSICIAN ASSISTANT

## 2018-11-28 PROCEDURE — 99999 PR PBB SHADOW E&M-EST. PATIENT-LVL III: CPT | Mod: PBBFAC,,, | Performed by: PHYSICIAN ASSISTANT

## 2018-11-28 PROCEDURE — 99214 OFFICE O/P EST MOD 30 MIN: CPT | Mod: 25,S$GLB,, | Performed by: PHYSICIAN ASSISTANT

## 2018-11-28 PROCEDURE — G0008 ADMIN INFLUENZA VIRUS VAC: HCPCS | Mod: S$GLB,,, | Performed by: PHYSICIAN ASSISTANT

## 2018-11-28 PROCEDURE — 90662 IIV NO PRSV INCREASED AG IM: CPT | Mod: S$GLB,,, | Performed by: PHYSICIAN ASSISTANT

## 2018-11-28 PROCEDURE — 1101F PT FALLS ASSESS-DOCD LE1/YR: CPT | Mod: CPTII,S$GLB,, | Performed by: PHYSICIAN ASSISTANT

## 2018-11-28 PROCEDURE — 3074F SYST BP LT 130 MM HG: CPT | Mod: CPTII,S$GLB,, | Performed by: PHYSICIAN ASSISTANT

## 2018-11-28 NOTE — PROGRESS NOTES
Subjective:       Patient ID: Jerica Rios is a 78 y.o. female.    Chief Complaint: Follow-up    HPI   Patient is a 78 year old female presenting to the clinic for 6 month follow-up on chronic conditions  1) HTN- well controlled on current medications; checks periodically at home and is within normal range.  2) DM II- last A1C 5/2018 5.5; not checking sugars 2/2 good control; compliant with medications. Patient has not established with a new optometrist but will do so per son. Foot exam today.   3) Hypothyroidism- stable on current medications, due for TSH  4) Lipid panel- well controlled on liptior 80mg daily; no myalgias or dark urine reported  5) Immunization def- due for flu vaccination and agrees to get this today  6) Chronic low back pain- taking OTC tylenol without any relief. She is established with Dr. Cole and will make an apt at check-out today; however, in the interm, patient requesting refill on tramadol to use for severe pain. She has used this in the past without any concern for sedation.   Review of Systems   Constitutional: Negative for activity change and unexpected weight change.   HENT: Negative for hearing loss, rhinorrhea and trouble swallowing.    Eyes: Negative for discharge and visual disturbance.   Respiratory: Negative for chest tightness and wheezing.    Cardiovascular: Negative for chest pain and palpitations.   Gastrointestinal: Negative for blood in stool, constipation, diarrhea and vomiting.   Endocrine: Negative for polydipsia and polyuria.   Genitourinary: Negative for difficulty urinating, dysuria, hematuria and menstrual problem.   Musculoskeletal: Positive for arthralgias and joint swelling. Negative for neck pain.   Neurological: Negative for weakness and headaches.   Psychiatric/Behavioral: Negative for confusion and dysphoric mood.       Objective:      Physical Exam   Constitutional: Vital signs are normal. She appears well-developed and well-nourished. No distress.    Cardiovascular: Normal rate, regular rhythm, S1 normal, S2 normal and normal heart sounds. Exam reveals no gallop.   No murmur heard.  Pulses:       Radial pulses are 2+ on the right side, and 2+ on the left side.        Dorsalis pedis pulses are 2+ on the right side.        Posterior tibial pulses are 2+ on the right side.   <2sec cap refill fingers bilat     Pulmonary/Chest: Effort normal and breath sounds normal. No respiratory distress. She has no wheezes. She has no rhonchi.   Feet:   Right Foot:   Protective Sensation: 7 sites tested. 7 sites sensed.   Skin Integrity: Positive for dry skin. Negative for ulcer, blister, skin breakdown, erythema, warmth or callus.   Left Foot:   Protective Sensation: 7 sites tested. 7 sites sensed.   Skin Integrity: Positive for dry skin. Negative for ulcer, blister, skin breakdown, erythema, warmth or callus.   Skin: Skin is warm and dry. She is not diaphoretic.   Appropriate skin turgor   Psychiatric: She has a normal mood and affect. Her speech is normal and behavior is normal. Judgment and thought content normal. Cognition and memory are normal.       Assessment:       1. Hypertension associated with diabetes    2. Hypothyroidism, unspecified type    3. Hyperlipidemia, unspecified hyperlipidemia type    4. DDD (degenerative disc disease), lumbar    5. Obesity, Class I, BMI 30-34.9, today 31.4        Plan:       Jerica was seen today for follow-up.    Diagnoses and all orders for this visit:    Hypertension associated with diabetes  Well controlled; no changes needed  -     Hemoglobin A1c; Future  -     Comprehensive metabolic panel; Future  -     Calcium, ionized; Future    Hypothyroidism, unspecified type  -     TSH; Future  -     Calcium, ionized; Future    Hyperlipidemia, unspecified hyperlipidemia type  -     Lipid panel; Future    DDD (degenerative disc disease), lumbar  Apt made with Dr. Cole  Refill for tramadol 50mg sent to Dr. Ramírez for approval.    Obesity,  Class I, BMI 30-34.9, today 31.4    Other orders  -     Influenza - High Dose (65+) (PF) (IM)        Patient readiness: acceptance and barriers:none    During the course of the visit the patient was educated and counseled about the following:     Diabetes:  Discussed general issues about diabetes pathophysiology and management.  Hypertension:   Medication: no change.  Obesity:   Regular aerobic exercise program discussed.    Goals: Diabetes: Maintain Hemoglobin A1C below 7, Hypertension: Reduce Blood Pressure and Obesity: Reduce calorie intake and BMI    Did patient meet goals/outcomes: No    The following self management tools provided: declined    Patient Instructions (the written plan) was given to the patient/family.     Time spent with patient: 25 minutes    Barriers to medications present (no )    Adverse reactions to current medications (no)    Over the counter medications reviewed (Yes)

## 2018-11-28 NOTE — PROGRESS NOTES
Pre-Visit Chart Review  For Appointment Scheduled on 11/28/18    Health Maintenance Due   Topic Date Due    Eye Exam  09/28/2017    Influenza Vaccine  08/01/2018    Foot Exam  08/16/2018    Hemoglobin A1c  11/23/2018

## 2018-11-29 RX ORDER — TRAMADOL HYDROCHLORIDE 50 MG/1
50 TABLET ORAL EVERY 6 HOURS PRN
Qty: 30 TABLET | Refills: 3 | Status: SHIPPED | OUTPATIENT
Start: 2018-11-29 | End: 2019-11-14 | Stop reason: CLARIF

## 2018-12-12 ENCOUNTER — LAB VISIT (OUTPATIENT)
Dept: LAB | Facility: HOSPITAL | Age: 78
End: 2018-12-12
Attending: PHYSICIAN ASSISTANT
Payer: MEDICARE

## 2018-12-12 DIAGNOSIS — E11.59 HYPERTENSION ASSOCIATED WITH DIABETES: ICD-10-CM

## 2018-12-12 DIAGNOSIS — E03.9 HYPOTHYROIDISM, UNSPECIFIED TYPE: ICD-10-CM

## 2018-12-12 DIAGNOSIS — I15.2 HYPERTENSION ASSOCIATED WITH DIABETES: ICD-10-CM

## 2018-12-12 DIAGNOSIS — E78.5 HYPERLIPIDEMIA, UNSPECIFIED HYPERLIPIDEMIA TYPE: ICD-10-CM

## 2018-12-12 LAB
ALBUMIN SERPL BCP-MCNC: 4.5 G/DL
ALP SERPL-CCNC: 123 U/L
ALT SERPL W/O P-5'-P-CCNC: 18 U/L
ANION GAP SERPL CALC-SCNC: 10 MMOL/L
AST SERPL-CCNC: 19 U/L
BILIRUB SERPL-MCNC: 0.6 MG/DL
BUN SERPL-MCNC: 11 MG/DL
CA-I BLDV-SCNC: 1.27 MMOL/L
CALCIUM SERPL-MCNC: 10.5 MG/DL
CHLORIDE SERPL-SCNC: 103 MMOL/L
CHOLEST SERPL-MCNC: 104 MG/DL
CHOLEST/HDLC SERPL: 2.1 {RATIO}
CO2 SERPL-SCNC: 28 MMOL/L
CREAT SERPL-MCNC: 0.9 MG/DL
EST. GFR  (AFRICAN AMERICAN): >60 ML/MIN/1.73 M^2
EST. GFR  (NON AFRICAN AMERICAN): >60 ML/MIN/1.73 M^2
ESTIMATED AVG GLUCOSE: 108 MG/DL
GLUCOSE SERPL-MCNC: 92 MG/DL
HBA1C MFR BLD HPLC: 5.4 %
HDLC SERPL-MCNC: 49 MG/DL
HDLC SERPL: 47.1 %
LDLC SERPL CALC-MCNC: 41.4 MG/DL
NONHDLC SERPL-MCNC: 55 MG/DL
POTASSIUM SERPL-SCNC: 3.8 MMOL/L
PROT SERPL-MCNC: 7.6 G/DL
SODIUM SERPL-SCNC: 141 MMOL/L
TRIGL SERPL-MCNC: 68 MG/DL
TSH SERPL DL<=0.005 MIU/L-ACNC: 3.93 UIU/ML

## 2018-12-12 PROCEDURE — 84443 ASSAY THYROID STIM HORMONE: CPT

## 2018-12-12 PROCEDURE — 83036 HEMOGLOBIN GLYCOSYLATED A1C: CPT

## 2018-12-12 PROCEDURE — 80061 LIPID PANEL: CPT

## 2018-12-12 PROCEDURE — 82330 ASSAY OF CALCIUM: CPT

## 2018-12-12 PROCEDURE — 80053 COMPREHEN METABOLIC PANEL: CPT

## 2018-12-12 PROCEDURE — 36415 COLL VENOUS BLD VENIPUNCTURE: CPT | Mod: PO

## 2018-12-18 RX ORDER — CLOPIDOGREL BISULFATE 75 MG/1
TABLET ORAL
Qty: 30 TABLET | Refills: 11 | Status: SHIPPED | OUTPATIENT
Start: 2018-12-18 | End: 2019-11-04 | Stop reason: SDUPTHER

## 2019-01-28 RX ORDER — SERTRALINE HYDROCHLORIDE 50 MG/1
TABLET, FILM COATED ORAL
Qty: 30 TABLET | Refills: 2 | Status: SHIPPED | OUTPATIENT
Start: 2019-01-28 | End: 2019-04-20 | Stop reason: SDUPTHER

## 2019-02-10 RX ORDER — BACLOFEN 10 MG/1
TABLET ORAL
Qty: 90 TABLET | Refills: 0 | Status: SHIPPED | OUTPATIENT
Start: 2019-02-10 | End: 2019-05-10 | Stop reason: SDUPTHER

## 2019-03-24 DIAGNOSIS — E03.9 HYPOTHYROIDISM: ICD-10-CM

## 2019-03-24 RX ORDER — LEVOTHYROXINE SODIUM 50 UG/1
TABLET ORAL
Qty: 90 TABLET | Refills: 3 | Status: SHIPPED | OUTPATIENT
Start: 2019-03-24 | End: 2020-05-14 | Stop reason: SDUPTHER

## 2019-04-22 RX ORDER — SERTRALINE HYDROCHLORIDE 50 MG/1
TABLET, FILM COATED ORAL
Qty: 30 TABLET | Refills: 2 | Status: SHIPPED | OUTPATIENT
Start: 2019-04-22 | End: 2019-07-11 | Stop reason: SDUPTHER

## 2019-05-10 RX ORDER — BACLOFEN 10 MG/1
TABLET ORAL
Qty: 90 TABLET | Refills: 0 | Status: SHIPPED | OUTPATIENT
Start: 2019-05-10 | End: 2019-08-04 | Stop reason: SDUPTHER

## 2019-06-12 RX ORDER — IRBESARTAN 300 MG/1
TABLET ORAL
Qty: 90 TABLET | Refills: 2 | Status: SHIPPED | OUTPATIENT
Start: 2019-06-12 | End: 2019-06-28 | Stop reason: SDUPTHER

## 2019-06-28 RX ORDER — IRBESARTAN 300 MG/1
TABLET ORAL
Qty: 90 TABLET | Refills: 2 | Status: SHIPPED | OUTPATIENT
Start: 2019-06-28 | End: 2020-03-16

## 2019-07-11 RX ORDER — SERTRALINE HYDROCHLORIDE 50 MG/1
50 TABLET, FILM COATED ORAL DAILY
Qty: 30 TABLET | Refills: 0 | Status: SHIPPED | OUTPATIENT
Start: 2019-07-11 | End: 2019-08-03 | Stop reason: SDUPTHER

## 2019-07-11 NOTE — TELEPHONE ENCOUNTER
LOV: 11/28/18 [place]  Next appt: 12/11/19 [facility]  Labs: 12/12/18  Last refill: 4/22/19

## 2019-07-12 ENCOUNTER — TELEPHONE (OUTPATIENT)
Dept: FAMILY MEDICINE | Facility: CLINIC | Age: 79
End: 2019-07-12

## 2019-07-12 NOTE — TELEPHONE ENCOUNTER
I spoke to the patient's son and got the patient scheduled to see CARMEN Mukherjee before establishing with Dr. Davis in December.

## 2019-07-12 NOTE — TELEPHONE ENCOUNTER
----- Message from Hugo Vincent sent at 7/12/2019  1:40 PM CDT -----  Type:  Patient Returning Call    Who Called:  Matthewyessy Tapiaers III (Son)  Who Left Message for Patient:  Aliyah  Does the patient know what this is regarding?:  Refills  Best Call Back Number:  [phone]  Additional Information:  Please call to advise

## 2019-07-22 ENCOUNTER — TELEPHONE (OUTPATIENT)
Dept: FAMILY MEDICINE | Facility: CLINIC | Age: 79
End: 2019-07-22

## 2019-07-24 ENCOUNTER — OFFICE VISIT (OUTPATIENT)
Dept: FAMILY MEDICINE | Facility: CLINIC | Age: 79
End: 2019-07-24
Payer: MEDICARE

## 2019-07-24 VITALS
HEIGHT: 65 IN | DIASTOLIC BLOOD PRESSURE: 60 MMHG | TEMPERATURE: 98 F | SYSTOLIC BLOOD PRESSURE: 122 MMHG | HEART RATE: 74 BPM | OXYGEN SATURATION: 96 % | WEIGHT: 194.88 LBS | BODY MASS INDEX: 32.47 KG/M2

## 2019-07-24 DIAGNOSIS — M79.674 PAIN IN RIGHT TOE(S): ICD-10-CM

## 2019-07-24 DIAGNOSIS — I15.2 HYPERTENSION ASSOCIATED WITH DIABETES: Primary | ICD-10-CM

## 2019-07-24 DIAGNOSIS — E03.9 HYPOTHYROIDISM, UNSPECIFIED TYPE: ICD-10-CM

## 2019-07-24 DIAGNOSIS — M51.36 DDD (DEGENERATIVE DISC DISEASE), LUMBAR: ICD-10-CM

## 2019-07-24 DIAGNOSIS — E11.9 CONTROLLED TYPE 2 DIABETES MELLITUS WITHOUT COMPLICATION, WITHOUT LONG-TERM CURRENT USE OF INSULIN: ICD-10-CM

## 2019-07-24 DIAGNOSIS — F41.8 DEPRESSION WITH ANXIETY: ICD-10-CM

## 2019-07-24 DIAGNOSIS — E78.5 HYPERLIPIDEMIA, UNSPECIFIED HYPERLIPIDEMIA TYPE: ICD-10-CM

## 2019-07-24 DIAGNOSIS — E11.59 HYPERTENSION ASSOCIATED WITH DIABETES: Primary | ICD-10-CM

## 2019-07-24 PROCEDURE — 99999 PR PBB SHADOW E&M-EST. PATIENT-LVL III: CPT | Mod: PBBFAC,,, | Performed by: NURSE PRACTITIONER

## 2019-07-24 PROCEDURE — 99499 UNLISTED E&M SERVICE: CPT | Mod: S$GLB,,, | Performed by: NURSE PRACTITIONER

## 2019-07-24 PROCEDURE — 3078F PR MOST RECENT DIASTOLIC BLOOD PRESSURE < 80 MM HG: ICD-10-PCS | Mod: CPTII,S$GLB,, | Performed by: NURSE PRACTITIONER

## 2019-07-24 PROCEDURE — 1101F PR PT FALLS ASSESS DOC 0-1 FALLS W/OUT INJ PAST YR: ICD-10-PCS | Mod: CPTII,S$GLB,, | Performed by: NURSE PRACTITIONER

## 2019-07-24 PROCEDURE — 3074F PR MOST RECENT SYSTOLIC BLOOD PRESSURE < 130 MM HG: ICD-10-PCS | Mod: CPTII,S$GLB,, | Performed by: NURSE PRACTITIONER

## 2019-07-24 PROCEDURE — 99999 PR PBB SHADOW E&M-EST. PATIENT-LVL III: ICD-10-PCS | Mod: PBBFAC,,, | Performed by: NURSE PRACTITIONER

## 2019-07-24 PROCEDURE — 1101F PT FALLS ASSESS-DOCD LE1/YR: CPT | Mod: CPTII,S$GLB,, | Performed by: NURSE PRACTITIONER

## 2019-07-24 PROCEDURE — 99499 RISK ADDL DX/OHS AUDIT: ICD-10-PCS | Mod: S$GLB,,, | Performed by: NURSE PRACTITIONER

## 2019-07-24 PROCEDURE — 3078F DIAST BP <80 MM HG: CPT | Mod: CPTII,S$GLB,, | Performed by: NURSE PRACTITIONER

## 2019-07-24 PROCEDURE — 99214 OFFICE O/P EST MOD 30 MIN: CPT | Mod: S$GLB,,, | Performed by: NURSE PRACTITIONER

## 2019-07-24 PROCEDURE — 99214 PR OFFICE/OUTPT VISIT, EST, LEVL IV, 30-39 MIN: ICD-10-PCS | Mod: S$GLB,,, | Performed by: NURSE PRACTITIONER

## 2019-07-24 PROCEDURE — 3074F SYST BP LT 130 MM HG: CPT | Mod: CPTII,S$GLB,, | Performed by: NURSE PRACTITIONER

## 2019-07-24 RX ORDER — GUAIFENESIN AND PHENYLEPHRINE HCL 400; 10 MG/1; MG/1
1 TABLET ORAL 3 TIMES DAILY
COMMUNITY

## 2019-07-24 RX ORDER — POTASSIUM CHLORIDE 750 MG/1
10 CAPSULE, EXTENDED RELEASE ORAL DAILY
Qty: 90 CAPSULE | Refills: 3 | Status: SHIPPED | OUTPATIENT
Start: 2019-07-24 | End: 2020-08-20 | Stop reason: SDUPTHER

## 2019-07-24 RX ORDER — CLONAZEPAM 0.5 MG/1
0.5 TABLET ORAL 2 TIMES DAILY PRN
Qty: 30 TABLET | Refills: 3 | Status: CANCELLED | OUTPATIENT
Start: 2019-07-24

## 2019-07-24 NOTE — PROGRESS NOTES
Subjective:       Patient ID: Jerica Rios is a 79 y.o. female.    Chief Complaint: Follow-up (6month)    Ms. Rios presents today to establish care with a new PCP. Previous patient of Dr. Ramírez. She is requesting refills on potassium and klonopin. No complaints today aside from chronic back pain. Patient of Dr. Cole. Reports occasional throbbing in her little toe on the right foot. Reports good appetite. Vitals stable.      Patient Active Problem List   Diagnosis    Adrenal nodule    Allergic rhinosinusitis    GERD (gastroesophageal reflux disease)    Depression with anxiety    Chronic pain    Atherosclerosis of native coronary artery without angina pectoris    DEVONTE (obstructive sleep apnea), not on Rx due to cost, off 2015, diagnosed in 4/2014    Presbylarynx, lower pitch, hoarse voice    Thyroid nodule, see CT April 2014    S/P coronary artery stent placement, multiple, last in CFx 2010    Type 2 diabetes mellitus, controlled    Hyperlipidemia with target LDL less than 70, baseline LDL not available    Obesity, Class I, BMI 30-34.9, today 31.4    DDD (degenerative disc disease), lumbar    Hypertension associated with diabetes    Abdominal obesity    Sedentary lifestyle    Primary osteoarthritis involving multiple joints    WELLS (dyspnea on exertion)    Diastolic dysfunction    Thrombocytosis    Acute otitis externa of left ear    Chronic fatigue    Hypothyroidism due to acquired atrophy of thyroid    Dementia without behavioral disturbance    Iron deficiency    Osteoporosis     Review of Systems   Constitutional: Negative for activity change and unexpected weight change.   HENT: Negative for hearing loss, rhinorrhea and trouble swallowing.    Eyes: Negative for discharge and visual disturbance.   Respiratory: Negative for chest tightness and wheezing.    Cardiovascular: Negative for chest pain and palpitations.   Gastrointestinal: Positive for constipation (not a current  "problem, increased fruits and vegetables in diet). Negative for blood in stool, diarrhea and vomiting.   Endocrine: Positive for polyuria (sometimes). Negative for polydipsia.   Genitourinary: Negative for difficulty urinating, dysuria, hematuria and menstrual problem.   Musculoskeletal: Positive for arthralgias and joint swelling. Negative for neck pain.   Neurological: Positive for weakness (occasional) and headaches (intermittent, better than "it used to be").   Psychiatric/Behavioral: Positive for confusion (diagnosed with early stage dementia) and dysphoric mood (controlled).       Objective:      Physical Exam   Constitutional: She is oriented to person, place, and time. She appears well-developed and well-nourished.   Cardiovascular: Normal rate, regular rhythm and normal heart sounds.   Pulmonary/Chest: Effort normal and breath sounds normal.   Musculoskeletal:        Feet:    Feet:   Right Foot:   Skin Integrity: Positive for dry skin. Negative for skin breakdown.   Neurological: She is alert and oriented to person, place, and time.   Skin: Skin is warm and dry.   No skin break down on right foot or between toes   Psychiatric: She has a normal mood and affect.   Nursing note and vitals reviewed.      Assessment:       1. Hypertension associated with diabetes    2. Hypothyroidism, unspecified type    3. Hyperlipidemia, unspecified hyperlipidemia type    4. DDD (degenerative disc disease), lumbar    5. Controlled type 2 diabetes mellitus without complication, without long-term current use of insulin    6. Depression with anxiety    7. Pain in right toe(s)        Plan:       Jerica was seen today for follow-up.    Diagnoses and all orders for this visit:    Hypertension associated with diabetes  Controlled on current medications    Hypothyroidism, unspecified type  -     TSH; Future  Stable condition.  Continue current medications.  Will adjust based on lab findings or if condition changes.    Hyperlipidemia, " unspecified hyperlipidemia type  -     CBC auto differential; Future  -     Comprehensive metabolic panel; Future  -     Lipid panel; Future  Stable condition.  Continue current medications.  Will adjust based on lab findings or if condition changes.    DDD (degenerative disc disease), lumbar  Under the care of pain management    Controlled type 2 diabetes mellitus without complication, without long-term current use of insulin  -     Hemoglobin A1c; Future  Stable condition.  Continue current medications.  Will adjust based on lab findings or if condition changes.    Depression with anxiety  Will submit klonopin refill to Dr. Davis for review  Tramadol listed on MAR from 2018  Risk of oversedation, resp depression with concurrent use of benzos and pain medications    Pain in right toes(s)  No evidence of skin breakdown  Erythema suggests poor fitting shoes  Educated on foot care for diabetics. Have had diabetic shoes in the past but eventually become too large.  Recommend wide shoes that fit well/have heel  support to prevent falls (has slide-type shoes on today)    Other orders  -     potassium chloride (KLOR-CON SPRINKLE) 10 MEQ CpSR; Take 1 capsule (10 mEq total) by mouth once daily.    Patient readiness: acceptance and barriers:none    During the course of the visit the patient was educated and counseled about the following:     Hypertension:   Medication: no change.    Goals: Hypertension: Reduce Blood Pressure    Did patient meet goals/outcomes: Yes    The following self management tools provided: declined    Patient Instructions (the written plan) was given to the patient/family.     Time spent with patient: 30 minutes    Barriers to medications present (no )    Adverse reactions to current medications (no)    Over the counter medications reviewed (Yes)    F/U as scheduled to establish with Dr. Davis

## 2019-07-25 RX ORDER — CLONAZEPAM 0.5 MG/1
0.5 TABLET ORAL 2 TIMES DAILY PRN
Qty: 30 TABLET | Refills: 0 | OUTPATIENT
Start: 2019-07-25

## 2019-07-25 NOTE — TELEPHONE ENCOUNTER
DPS checked. LAst klonopin filled 7/18.  Not on this chronically.  Not recommended for elders opal when on occ tramadol for pain. Recommend alternatives for anxiety mgmt -non habit forming.  Make another appt to discuss options if needed

## 2019-07-30 NOTE — TELEPHONE ENCOUNTER
I spoke to the patient's son and notified him that the patient needs an appointment. Scheduled patient to see CARMEN Mukherjee on 8/14/19.

## 2019-08-03 RX ORDER — SERTRALINE HYDROCHLORIDE 50 MG/1
TABLET, FILM COATED ORAL
Qty: 30 TABLET | Refills: 0 | Status: SHIPPED | OUTPATIENT
Start: 2019-08-03 | End: 2019-08-14 | Stop reason: SDUPTHER

## 2019-08-05 RX ORDER — BACLOFEN 10 MG/1
TABLET ORAL
Qty: 90 TABLET | Refills: 0 | Status: SHIPPED | OUTPATIENT
Start: 2019-08-05 | End: 2019-10-27 | Stop reason: SDUPTHER

## 2019-08-07 ENCOUNTER — LAB VISIT (OUTPATIENT)
Dept: LAB | Facility: HOSPITAL | Age: 79
End: 2019-08-07
Attending: NURSE PRACTITIONER
Payer: MEDICARE

## 2019-08-07 DIAGNOSIS — E11.9 CONTROLLED TYPE 2 DIABETES MELLITUS WITHOUT COMPLICATION, WITHOUT LONG-TERM CURRENT USE OF INSULIN: ICD-10-CM

## 2019-08-07 DIAGNOSIS — E78.5 HYPERLIPIDEMIA, UNSPECIFIED HYPERLIPIDEMIA TYPE: ICD-10-CM

## 2019-08-07 DIAGNOSIS — E03.9 HYPOTHYROIDISM, UNSPECIFIED TYPE: ICD-10-CM

## 2019-08-07 LAB
ALBUMIN SERPL BCP-MCNC: 4.2 G/DL (ref 3.5–5.2)
ALP SERPL-CCNC: 120 U/L (ref 55–135)
ALT SERPL W/O P-5'-P-CCNC: 20 U/L (ref 10–44)
ANION GAP SERPL CALC-SCNC: 11 MMOL/L (ref 8–16)
AST SERPL-CCNC: 17 U/L (ref 10–40)
BASOPHILS # BLD AUTO: 0.04 K/UL (ref 0–0.2)
BASOPHILS NFR BLD: 0.4 % (ref 0–1.9)
BILIRUB SERPL-MCNC: 0.6 MG/DL (ref 0.1–1)
BUN SERPL-MCNC: 18 MG/DL (ref 8–23)
CALCIUM SERPL-MCNC: 10.1 MG/DL (ref 8.7–10.5)
CHLORIDE SERPL-SCNC: 104 MMOL/L (ref 95–110)
CHOLEST SERPL-MCNC: 109 MG/DL (ref 120–199)
CHOLEST/HDLC SERPL: 2.6 {RATIO} (ref 2–5)
CO2 SERPL-SCNC: 26 MMOL/L (ref 23–29)
CREAT SERPL-MCNC: 1 MG/DL (ref 0.5–1.4)
DIFFERENTIAL METHOD: ABNORMAL
EOSINOPHIL # BLD AUTO: 0.2 K/UL (ref 0–0.5)
EOSINOPHIL NFR BLD: 2.6 % (ref 0–8)
ERYTHROCYTE [DISTWIDTH] IN BLOOD BY AUTOMATED COUNT: 15.3 % (ref 11.5–14.5)
EST. GFR  (AFRICAN AMERICAN): >60 ML/MIN/1.73 M^2
EST. GFR  (NON AFRICAN AMERICAN): 53.7 ML/MIN/1.73 M^2
ESTIMATED AVG GLUCOSE: 111 MG/DL (ref 68–131)
GLUCOSE SERPL-MCNC: 91 MG/DL (ref 70–110)
HBA1C MFR BLD HPLC: 5.5 % (ref 4–5.6)
HCT VFR BLD AUTO: 36.4 % (ref 37–48.5)
HDLC SERPL-MCNC: 42 MG/DL (ref 40–75)
HDLC SERPL: 38.5 % (ref 20–50)
HGB BLD-MCNC: 11.6 G/DL (ref 12–16)
IMM GRANULOCYTES # BLD AUTO: 0.03 K/UL (ref 0–0.04)
IMM GRANULOCYTES NFR BLD AUTO: 0.3 % (ref 0–0.5)
LDLC SERPL CALC-MCNC: 52 MG/DL (ref 63–159)
LYMPHOCYTES # BLD AUTO: 2 K/UL (ref 1–4.8)
LYMPHOCYTES NFR BLD: 21.3 % (ref 18–48)
MCH RBC QN AUTO: 28.9 PG (ref 27–31)
MCHC RBC AUTO-ENTMCNC: 31.9 G/DL (ref 32–36)
MCV RBC AUTO: 91 FL (ref 82–98)
MONOCYTES # BLD AUTO: 0.8 K/UL (ref 0.3–1)
MONOCYTES NFR BLD: 8.6 % (ref 4–15)
NEUTROPHILS # BLD AUTO: 6.2 K/UL (ref 1.8–7.7)
NEUTROPHILS NFR BLD: 66.8 % (ref 38–73)
NONHDLC SERPL-MCNC: 67 MG/DL
NRBC BLD-RTO: 0 /100 WBC
PLATELET # BLD AUTO: 343 K/UL (ref 150–350)
PMV BLD AUTO: 11.5 FL (ref 9.2–12.9)
POTASSIUM SERPL-SCNC: 3.8 MMOL/L (ref 3.5–5.1)
PROT SERPL-MCNC: 7 G/DL (ref 6–8.4)
RBC # BLD AUTO: 4.01 M/UL (ref 4–5.4)
SODIUM SERPL-SCNC: 141 MMOL/L (ref 136–145)
TRIGL SERPL-MCNC: 75 MG/DL (ref 30–150)
TSH SERPL DL<=0.005 MIU/L-ACNC: 2.06 UIU/ML (ref 0.4–4)
WBC # BLD AUTO: 9.26 K/UL (ref 3.9–12.7)

## 2019-08-07 PROCEDURE — 36415 COLL VENOUS BLD VENIPUNCTURE: CPT | Mod: PO

## 2019-08-07 PROCEDURE — 85025 COMPLETE CBC W/AUTO DIFF WBC: CPT

## 2019-08-07 PROCEDURE — 80061 LIPID PANEL: CPT

## 2019-08-07 PROCEDURE — 84443 ASSAY THYROID STIM HORMONE: CPT

## 2019-08-07 PROCEDURE — 83036 HEMOGLOBIN GLYCOSYLATED A1C: CPT

## 2019-08-07 PROCEDURE — 80053 COMPREHEN METABOLIC PANEL: CPT

## 2019-08-14 ENCOUNTER — OFFICE VISIT (OUTPATIENT)
Dept: FAMILY MEDICINE | Facility: CLINIC | Age: 79
End: 2019-08-14
Payer: MEDICARE

## 2019-08-14 VITALS
BODY MASS INDEX: 32.21 KG/M2 | HEIGHT: 65 IN | OXYGEN SATURATION: 96 % | HEART RATE: 82 BPM | WEIGHT: 193.31 LBS | SYSTOLIC BLOOD PRESSURE: 118 MMHG | TEMPERATURE: 99 F | DIASTOLIC BLOOD PRESSURE: 68 MMHG

## 2019-08-14 DIAGNOSIS — M51.36 DDD (DEGENERATIVE DISC DISEASE), LUMBAR: ICD-10-CM

## 2019-08-14 DIAGNOSIS — D50.9 IRON DEFICIENCY ANEMIA, UNSPECIFIED IRON DEFICIENCY ANEMIA TYPE: ICD-10-CM

## 2019-08-14 DIAGNOSIS — I15.2 HYPERTENSION ASSOCIATED WITH DIABETES: ICD-10-CM

## 2019-08-14 DIAGNOSIS — E11.59 HYPERTENSION ASSOCIATED WITH DIABETES: ICD-10-CM

## 2019-08-14 DIAGNOSIS — F41.8 DEPRESSION WITH ANXIETY: Primary | ICD-10-CM

## 2019-08-14 PROCEDURE — 99499 RISK ADDL DX/OHS AUDIT: ICD-10-PCS | Mod: S$GLB,,, | Performed by: NURSE PRACTITIONER

## 2019-08-14 PROCEDURE — 3074F SYST BP LT 130 MM HG: CPT | Mod: CPTII,S$GLB,, | Performed by: NURSE PRACTITIONER

## 2019-08-14 PROCEDURE — 1101F PR PT FALLS ASSESS DOC 0-1 FALLS W/OUT INJ PAST YR: ICD-10-PCS | Mod: CPTII,S$GLB,, | Performed by: NURSE PRACTITIONER

## 2019-08-14 PROCEDURE — 99213 OFFICE O/P EST LOW 20 MIN: CPT | Mod: S$GLB,,, | Performed by: NURSE PRACTITIONER

## 2019-08-14 PROCEDURE — 99999 PR PBB SHADOW E&M-EST. PATIENT-LVL IV: ICD-10-PCS | Mod: PBBFAC,,, | Performed by: NURSE PRACTITIONER

## 2019-08-14 PROCEDURE — 3078F DIAST BP <80 MM HG: CPT | Mod: CPTII,S$GLB,, | Performed by: NURSE PRACTITIONER

## 2019-08-14 PROCEDURE — 99999 PR PBB SHADOW E&M-EST. PATIENT-LVL IV: CPT | Mod: PBBFAC,,, | Performed by: NURSE PRACTITIONER

## 2019-08-14 PROCEDURE — 99499 UNLISTED E&M SERVICE: CPT | Mod: S$GLB,,, | Performed by: NURSE PRACTITIONER

## 2019-08-14 PROCEDURE — 3078F PR MOST RECENT DIASTOLIC BLOOD PRESSURE < 80 MM HG: ICD-10-PCS | Mod: CPTII,S$GLB,, | Performed by: NURSE PRACTITIONER

## 2019-08-14 PROCEDURE — 99213 PR OFFICE/OUTPT VISIT, EST, LEVL III, 20-29 MIN: ICD-10-PCS | Mod: S$GLB,,, | Performed by: NURSE PRACTITIONER

## 2019-08-14 PROCEDURE — 1101F PT FALLS ASSESS-DOCD LE1/YR: CPT | Mod: CPTII,S$GLB,, | Performed by: NURSE PRACTITIONER

## 2019-08-14 PROCEDURE — 3074F PR MOST RECENT SYSTOLIC BLOOD PRESSURE < 130 MM HG: ICD-10-PCS | Mod: CPTII,S$GLB,, | Performed by: NURSE PRACTITIONER

## 2019-08-14 RX ORDER — SERTRALINE HYDROCHLORIDE 100 MG/1
100 TABLET, FILM COATED ORAL DAILY
Qty: 90 TABLET | Refills: 3 | Status: SHIPPED | OUTPATIENT
Start: 2019-08-14 | End: 2020-07-31 | Stop reason: SDUPTHER

## 2019-08-14 RX ORDER — FERROUS SULFATE 324(65)MG
325 TABLET, DELAYED RELEASE (ENTERIC COATED) ORAL 2 TIMES DAILY
COMMUNITY
End: 2022-12-07 | Stop reason: SDUPTHER

## 2019-08-14 NOTE — PROGRESS NOTES
Subjective:       Patient ID: Jerica Rios is a 79 y.o. female.    Chief Complaint: Anxiety    Ms. Rios presents today to discuss anxiety medications. Previously prescribed Klonopin by past PCP, used PRN. Also previously prescribed tramadol for pain PRN. Given age and use of pain medication, benzodiazepines are not recommended. She and are son are in agreement with this. Reports that she took klonopin very sparingly- sometimes it was weeks or months between doses. Only took if she was overwhelmed/felt like she was having a panic attack. Has not taken tramadol in months because she has not seen Dr. Cole. Medication list and recent labs reviewed in detail. Vitals stable.     Patient Active Problem List   Diagnosis    Adrenal nodule    Allergic rhinosinusitis    GERD (gastroesophageal reflux disease)    Depression with anxiety    Chronic pain    Atherosclerosis of native coronary artery without angina pectoris    DEVONTE (obstructive sleep apnea), not on Rx due to cost, off 2015, diagnosed in 4/2014    Presbylarynx, lower pitch, hoarse voice    Thyroid nodule, see CT April 2014    S/P coronary artery stent placement, multiple, last in CFx 2010    Type 2 diabetes mellitus, controlled    Hyperlipidemia with target LDL less than 70, baseline LDL not available    Obesity, Class I, BMI 30-34.9, today 31.4    DDD (degenerative disc disease), lumbar    Hypertension associated with diabetes    Abdominal obesity    Sedentary lifestyle    Primary osteoarthritis involving multiple joints    WELLS (dyspnea on exertion)    Diastolic dysfunction    Thrombocytosis    Acute otitis externa of left ear    Chronic fatigue    Hypothyroidism due to acquired atrophy of thyroid    Dementia without behavioral disturbance    Iron deficiency    Osteoporosis     Review of Systems   Constitutional: Negative for activity change and unexpected weight change.   HENT: Negative for hearing loss, rhinorrhea and trouble  swallowing.    Eyes: Negative for discharge and visual disturbance.   Respiratory: Negative for chest tightness and wheezing.    Cardiovascular: Negative for chest pain and palpitations.   Gastrointestinal: Negative for blood in stool, constipation, diarrhea and vomiting.   Endocrine: Positive for polyuria. Negative for polydipsia.   Genitourinary: Negative for difficulty urinating, dysuria, hematuria and menstrual problem.   Musculoskeletal: Positive for arthralgias (hx chronic pain) and joint swelling (hx chronic pain). Negative for neck pain.   Neurological: Negative for weakness and headaches.   Psychiatric/Behavioral: Positive for confusion (hx of dementia, no change). Negative for dysphoric mood.       Lab Results   Component Value Date    WBC 9.26 08/07/2019    HGB 11.6 (L) 08/07/2019    HCT 36.4 (L) 08/07/2019     08/07/2019    CHOL 109 (L) 08/07/2019    TRIG 75 08/07/2019    HDL 42 08/07/2019    ALT 20 08/07/2019    AST 17 08/07/2019     08/07/2019    K 3.8 08/07/2019     08/07/2019    CREATININE 1.0 08/07/2019    BUN 18 08/07/2019    CO2 26 08/07/2019    TSH 2.062 08/07/2019    INR 1.0 10/16/2013    HGBA1C 5.5 08/07/2019     Objective:      Physical Exam   Constitutional: She is oriented to person, place, and time. She appears well-developed and well-nourished.   Cardiovascular: Normal rate, regular rhythm and normal heart sounds.   Pulmonary/Chest: Effort normal and breath sounds normal.   Musculoskeletal: She exhibits no edema.   Neurological: She is alert and oriented to person, place, and time.   Skin: Skin is warm and dry.   Psychiatric: She has a normal mood and affect.   Nursing note and vitals reviewed.      Assessment:       1. Depression with anxiety    2. Hypertension associated with diabetes    3. DDD (degenerative disc disease), lumbar    4. Iron deficiency anemia, unspecified iron deficiency anemia type        Plan:       Jerica was seen today for anxiety.    Diagnoses and  all orders for this visit:    Depression with anxiety  Increase   -     sertraline (ZOLOFT) 100 MG tablet; Take 1 tablet (100 mg total) by mouth once daily.  F/U 1 month    Hypertension associated with diabetes  Controlled     DDD (degenerative disc disease), lumbar  Currently managed conservatively    Iron deficiency anemia, unspecified iron deficiency anemia type  -     Ferritin; Future  -     Iron and TIBC; Future  -     POCT Hemocult Stool X3  -     CBC auto differential; Future  On iron supplement BID, slight drop in H&H.   Repeat labs 1 month  Return hemocult as instructed    F/U 1 month, labs prior

## 2019-09-04 RX ORDER — HYDROCHLOROTHIAZIDE 25 MG/1
25 TABLET ORAL DAILY
Qty: 90 TABLET | Refills: 3 | Status: SHIPPED | OUTPATIENT
Start: 2019-09-04 | End: 2020-08-28 | Stop reason: SDUPTHER

## 2019-09-11 DIAGNOSIS — E11.9 TYPE 2 DIABETES MELLITUS WITHOUT COMPLICATION: ICD-10-CM

## 2019-09-11 RX ORDER — METFORMIN HYDROCHLORIDE 500 MG/1
TABLET ORAL
Qty: 180 TABLET | Refills: 3 | Status: SHIPPED | OUTPATIENT
Start: 2019-09-11 | End: 2020-08-28 | Stop reason: SDUPTHER

## 2019-09-12 RX ORDER — AMLODIPINE BESYLATE 10 MG/1
10 TABLET ORAL DAILY
Qty: 90 TABLET | Refills: 0 | Status: SHIPPED | OUTPATIENT
Start: 2019-09-12 | End: 2019-12-04 | Stop reason: SDUPTHER

## 2019-09-13 ENCOUNTER — HOSPITAL ENCOUNTER (EMERGENCY)
Facility: HOSPITAL | Age: 79
Discharge: HOME | End: 2019-09-13
Attending: EMERGENCY MEDICINE
Payer: MEDICARE

## 2019-09-13 VITALS
OXYGEN SATURATION: 97 % | RESPIRATION RATE: 20 BRPM | BODY MASS INDEX: 32.12 KG/M2 | TEMPERATURE: 99 F | DIASTOLIC BLOOD PRESSURE: 70 MMHG | WEIGHT: 193 LBS | HEART RATE: 104 BPM | SYSTOLIC BLOOD PRESSURE: 146 MMHG

## 2019-09-13 DIAGNOSIS — R52 PAIN: ICD-10-CM

## 2019-09-13 DIAGNOSIS — S80.00XA CONTUSION OF KNEE, UNSPECIFIED LATERALITY, INITIAL ENCOUNTER: Primary | ICD-10-CM

## 2019-09-13 PROCEDURE — 99284 EMERGENCY DEPT VISIT MOD MDM: CPT | Mod: 25

## 2019-09-14 ENCOUNTER — PATIENT MESSAGE (OUTPATIENT)
Dept: FAMILY MEDICINE | Facility: CLINIC | Age: 79
End: 2019-09-14

## 2019-09-14 NOTE — ED PROVIDER NOTES
Encounter Date: 9/13/2019    SCRIBE #1 NOTE: I, Liza Ricardo, am scribing for, and in the presence of, Emani Garcia NP.       History     Chief Complaint   Patient presents with    Fall     c/o bilateral knee pain. fell today     Time seen by provider: 7:18 PM on 09/13/2019    Jerica Rios is a 79 y.o. female with a PMHx of DM, HTN, CAD, and HLD who presents to the ED with an onset of bilateral knee pain s/p fall, which occurred 5 hours PTA. Pt's son states that her right knee gives out on her a lot. He states she was getting out of her bed when her right knee gave out causing her to fall onto her knees. She reports her knees hurt when she bears weight onto her them. She denies hitting her head or LOC. The patient denies neck pain or any other symptoms at this time.  No pertinent PSHx. Amoxicillin-pot Clavulanate, Hydrocodone-acetaminophen, Meperidine, Propoxyphene N-acetaminophen, and Sulfa drug allergies.    The history is provided by the patient and a relative.     Review of patient's allergies indicates:   Allergen Reactions    Amoxicillin-pot clavulanate      Other reaction(s): CONSTIPATION    Hydrocodone-acetaminophen Other (See Comments)     Other reaction(s): irritable  Other reaction(s): aggressive    Meperidine Other (See Comments)     Other reaction(s): irritability  Other reaction(s): aggressiveness    Propoxyphene n-acetaminophen      Other reaction(s): irritable  Other reaction(s): aggressive    Sulfa (sulfonamide antibiotics)      Other reaction(s): Rash     Past Medical History:   Diagnosis Date    Allergy     Arthritis     Carotid artery disease     Coronary artery disease     Diabetes mellitus     Diabetes mellitus, type 2     Fall at home     GERD (gastroesophageal reflux disease)     Headache(784.0)     Hyperlipidemia     Hypertension     Mental disorder     Obesity     Right rib fracture     Snoring     Toe fracture, right      Past Surgical History:   Procedure  Laterality Date    CHOLECYSTECTOMY      COLONOSCOPY N/A 6/11/2014    [facility]    CORONARY STENT PLACEMENT      x 5    CYST REMOVAL      sebaceous cyst from back    EYE SURGERY      INJECTION-STEROID-EPIDURAL-TRANSFORAMINAL Bilateral 12/4/2015    [facility]    OVARIAN CYST REMOVAL      SINUS SURGERY      TONSILLECTOMY       Family History   Problem Relation Age of Onset    Heart disease Mother     Diabetes Mother     Cancer Mother         breast cancer    Heart disease Father     Heart disease Sister     Stroke Brother     Dementia Brother     Heart disease Brother      Social History     Tobacco Use    Smoking status: Never Smoker    Smokeless tobacco: Never Used   Substance Use Topics    Alcohol use: No     Frequency: Never     Drinks per session: Patient refused     Binge frequency: Never    Drug use: No     Review of Systems   Constitutional: Negative for fever.   HENT: Negative for sore throat.    Respiratory: Negative for shortness of breath.    Cardiovascular: Negative for chest pain.   Gastrointestinal: Negative for nausea.   Genitourinary: Negative for dysuria.   Musculoskeletal: Positive for arthralgias (bilateral knees). Negative for back pain and neck pain.   Skin: Negative for rash.   Neurological: Negative for syncope and weakness.   Hematological: Does not bruise/bleed easily.       Physical Exam     Initial Vitals [09/13/19 1808]   BP Pulse Resp Temp SpO2   (!) 146/70 104 20 98.7 °F (37.1 °C) 97 %      MAP       --         Physical Exam    Nursing note and vitals reviewed.  Constitutional: Vital signs are normal.   HENT:   Head: Normocephalic and atraumatic.   Eyes: Pupils are equal, round, and reactive to light.   Neck: Neck supple.   Cardiovascular: Normal rate, regular rhythm, normal heart sounds and intact distal pulses. Exam reveals no gallop and no friction rub.    No murmur heard.  Pulses:       Dorsalis  pedis pulses are 2+ on the right side, and 2+ on the left side.        Posterior tibial pulses are 2+ on the right side, and 2+ on the left side.   Pulmonary/Chest: Breath sounds normal. She has no wheezes. She has no rhonchi. She has no rales.   Abdominal: Soft. Normal appearance and bowel sounds are normal. There is no tenderness.   Musculoskeletal:        Right hip: She exhibits normal range of motion.        Left hip: She exhibits normal range of motion.        Right knee: She exhibits decreased range of motion. She exhibits no swelling, no effusion, no deformity, normal alignment, no LCL laxity, normal patellar mobility, no bony tenderness, normal meniscus and no MCL laxity. Tenderness found. No medial joint line and no patellar tendon tenderness noted.        Left knee: She exhibits decreased range of motion. She exhibits no swelling, no effusion, no deformity, no laceration, no erythema, normal alignment, no LCL laxity, normal patellar mobility, no bony tenderness and no MCL laxity. Tenderness found. No medial joint line tenderness noted.        Right ankle: Normal.        Left ankle: Normal.    Normal passive ROM of bilateral hips without pain. + 2 pedal pulses bi lower extremities.    Neurological: She is alert. She has normal strength.   Oriented x 2 (pt baseline per son at bedside). 5/5 strength and sensation of upper and lower extremities bi; no focal neurological deficits on exam. Pt does have an unsteady gait at baseline per son she uses walker. She is able to bear weight and transfer in ED.    Skin: Skin is warm, dry and intact.   Psychiatric: She has a normal mood and affect. Her speech is normal and behavior is normal.         ED Course   Procedures  Labs Reviewed - No data to display       Imaging Results          X-Ray Knee 3 View Right (In process)                X-Ray Knee 3 View Left (In process)                  Medical Decision Making:   History:   Old Medical Records: I decided to obtain old  medical records.  Differential Diagnosis:   Fracture  Dislocation  Ligament injury  Clinical Tests:   Radiological Study: Reviewed and Ordered       APC / Resident Notes:   Patient is a 79 y.o. female who presents to the ED 09/13/2019 who underwent emergent evaluation for bilateral knee pain status post ground level fall that occurred today.  Patient presents with her son who is her primary caregiver as she does have dementia at baseline.  She is oriented x2 at baseline.  She has no focal neurological deficits on exam.  Son and patient report that she did not hit her head or lose consciousness.  He states she has been acting herself except for complaints of knee pain. Her head is atraumatic and normocephalic. She denies any neck pain and has normal range of motion of her neck without pain. No midline cervical spine tenderness. She has 5/5 strength and normal sensation bilateral upper and lower extremities. She is able to bear weight on her lower extremities and transfer in the emergency department with some assistance.  Son states that she walks with a walker and has an unsteady gait at baseline.  Patient has no swelling of the knees.  There are no deformities or patellar tenderness of the knees.  There is no increased laxity of the joints. There is no swelling or effusions.  She has +2 distal pulses to bilateral lower extremities without signs neurovascular compromise.  X-rays knees without acute findings.  I do not think acute fracture dislocation.  Discussed with some possible ligament injury and follow-up with an orthopedic for clearance for possible therapy as patient states her knee often gives out on her and discussed need for possible outpatient PT if cleared by Orthopedic for lower extremity strengthening and fall prevention.  X-ray findings discussed with patient and caregiver who verbalized understanding and they are agreeable to above plan of care. Based on my clinical evaluation, I do not appreciate any  immediate, emergent, or life threatening condition or etiology that warrants additional workup today and feel that the patient can be discharged with close follow up care. Case discussed with Dr. Robles who is agreeable to plan of care. Follow up and return precautions discussed; patient verbalized understanding and is agreeable to plan of care. Patient discharged home in stable condition.                 Scribe Attestation:   Scribe #1: I performed the above scribed service and the documentation accurately describes the services I performed. I attest to the accuracy of the note.    Attending Attestation:           Physician Attestation for Scribe:  Physician Attestation Statement for Scribe #1: I, Emani Garcia, reviewed documentation, as scribed by in my presence, and it is both accurate and complete.     Comments: I, OSCAR StaffodrC, personally performed the services described in this documentation. All medical record entries made by the scribe were at my direction and in my presence.  I have reviewed the chart and agree that the record reflects my personal performance and is accurate and complete. CORI Stafford.  9:27 PM 09/13/2019                Clinical Impression:       ICD-10-CM ICD-9-CM   1. Contusion of knee, unspecified laterality, initial encounter S80.00XA 924.11   2. Pain R52 780.96         Disposition:   Disposition: Discharged  Condition: Stable                        Emani Garcia NP  09/13/19 5623

## 2019-09-18 ENCOUNTER — TELEPHONE (OUTPATIENT)
Dept: FAMILY MEDICINE | Facility: CLINIC | Age: 79
End: 2019-09-18

## 2019-09-18 ENCOUNTER — OFFICE VISIT (OUTPATIENT)
Dept: FAMILY MEDICINE | Facility: CLINIC | Age: 79
End: 2019-09-18
Payer: MEDICARE

## 2019-09-18 VITALS
TEMPERATURE: 99 F | OXYGEN SATURATION: 96 % | SYSTOLIC BLOOD PRESSURE: 120 MMHG | HEART RATE: 80 BPM | HEIGHT: 60 IN | DIASTOLIC BLOOD PRESSURE: 62 MMHG | BODY MASS INDEX: 37.69 KG/M2

## 2019-09-18 DIAGNOSIS — M81.0 OSTEOPOROSIS, UNSPECIFIED OSTEOPOROSIS TYPE, UNSPECIFIED PATHOLOGICAL FRACTURE PRESENCE: ICD-10-CM

## 2019-09-18 DIAGNOSIS — G89.4 CHRONIC PAIN SYNDROME: ICD-10-CM

## 2019-09-18 DIAGNOSIS — R29.6 MULTIPLE FALLS: ICD-10-CM

## 2019-09-18 DIAGNOSIS — M25.562 ACUTE BILATERAL KNEE PAIN: ICD-10-CM

## 2019-09-18 DIAGNOSIS — R53.81 DEBILITY: ICD-10-CM

## 2019-09-18 DIAGNOSIS — M25.561 ACUTE BILATERAL KNEE PAIN: ICD-10-CM

## 2019-09-18 DIAGNOSIS — R31.9 HEMATURIA, UNSPECIFIED TYPE: Primary | ICD-10-CM

## 2019-09-18 LAB
BILIRUB SERPL-MCNC: ABNORMAL MG/DL
BLOOD URINE, POC: ABNORMAL
COLOR, POC UA: YELLOW
GLUCOSE UR QL STRIP: ABNORMAL
KETONES UR QL STRIP: ABNORMAL
LEUKOCYTE ESTERASE URINE, POC: ABNORMAL
NITRITE, POC UA: ABNORMAL
PH, POC UA: 8
PROTEIN, POC: 30
SPECIFIC GRAVITY, POC UA: 1005
UROBILINOGEN, POC UA: ABNORMAL

## 2019-09-18 PROCEDURE — 87086 URINE CULTURE/COLONY COUNT: CPT

## 2019-09-18 PROCEDURE — 99214 OFFICE O/P EST MOD 30 MIN: CPT | Mod: 25,S$GLB,, | Performed by: NURSE PRACTITIONER

## 2019-09-18 PROCEDURE — 99214 PR OFFICE/OUTPT VISIT, EST, LEVL IV, 30-39 MIN: ICD-10-PCS | Mod: 25,S$GLB,, | Performed by: NURSE PRACTITIONER

## 2019-09-18 PROCEDURE — 81002 POCT URINE DIPSTICK WITHOUT MICROSCOPE: ICD-10-PCS | Mod: S$GLB,,, | Performed by: NURSE PRACTITIONER

## 2019-09-18 PROCEDURE — 99999 PR PBB SHADOW E&M-EST. PATIENT-LVL IV: CPT | Mod: PBBFAC,,, | Performed by: NURSE PRACTITIONER

## 2019-09-18 PROCEDURE — 1101F PR PT FALLS ASSESS DOC 0-1 FALLS W/OUT INJ PAST YR: ICD-10-PCS | Mod: CPTII,S$GLB,, | Performed by: NURSE PRACTITIONER

## 2019-09-18 PROCEDURE — 99999 PR PBB SHADOW E&M-EST. PATIENT-LVL IV: ICD-10-PCS | Mod: PBBFAC,,, | Performed by: NURSE PRACTITIONER

## 2019-09-18 PROCEDURE — 3078F PR MOST RECENT DIASTOLIC BLOOD PRESSURE < 80 MM HG: ICD-10-PCS | Mod: CPTII,S$GLB,, | Performed by: NURSE PRACTITIONER

## 2019-09-18 PROCEDURE — 3074F PR MOST RECENT SYSTOLIC BLOOD PRESSURE < 130 MM HG: ICD-10-PCS | Mod: CPTII,S$GLB,, | Performed by: NURSE PRACTITIONER

## 2019-09-18 PROCEDURE — 1101F PT FALLS ASSESS-DOCD LE1/YR: CPT | Mod: CPTII,S$GLB,, | Performed by: NURSE PRACTITIONER

## 2019-09-18 PROCEDURE — 3074F SYST BP LT 130 MM HG: CPT | Mod: CPTII,S$GLB,, | Performed by: NURSE PRACTITIONER

## 2019-09-18 PROCEDURE — 3078F DIAST BP <80 MM HG: CPT | Mod: CPTII,S$GLB,, | Performed by: NURSE PRACTITIONER

## 2019-09-18 PROCEDURE — 81002 URINALYSIS NONAUTO W/O SCOPE: CPT | Mod: S$GLB,,, | Performed by: NURSE PRACTITIONER

## 2019-09-18 RX ORDER — DICLOFENAC SODIUM 10 MG/G
2 GEL TOPICAL 3 TIMES DAILY PRN
Qty: 100 G | Refills: 0 | Status: SHIPPED | OUTPATIENT
Start: 2019-09-18 | End: 2019-09-25

## 2019-09-18 RX ORDER — CIPROFLOXACIN 250 MG/1
250 TABLET, FILM COATED ORAL 2 TIMES DAILY
Qty: 6 TABLET | Refills: 0 | Status: SHIPPED | OUTPATIENT
Start: 2019-09-18 | End: 2019-09-21

## 2019-09-18 RX ORDER — IBANDRONATE SODIUM 150 MG/1
150 TABLET, FILM COATED ORAL
Qty: 1 TABLET | Refills: 11 | Status: SHIPPED | OUTPATIENT
Start: 2019-09-18 | End: 2019-12-11 | Stop reason: SDUPTHER

## 2019-09-18 NOTE — TELEPHONE ENCOUNTER
Spoke with patient son gilberto regarding his call, he will bring patient in early to talk to eli about future care, then see us.

## 2019-09-18 NOTE — TELEPHONE ENCOUNTER
----- Message from Shelli Wolfe sent at 9/18/2019  8:32 AM CDT -----  Type: Needs Medical Advice    Who Called:  Son (Matthew Rios)  Best Call Back Number: [phone] or [phone] (cellphone)  Additional Information: Needs to speak with nurse (Lucia Woods)concerning patient/stated patient took a fall last week and not mobile/needs advice/please call back to advise. (patient has appointments scheduled this afternoon)   Bilateral LE grossly >3/5 throughout

## 2019-09-18 NOTE — PATIENT INSTRUCTIONS
Preventing Pressure Sores (Pressure Ulcers)  Pressure sores can develop quickly, even in healthy skin. Thats why taking steps to prevent them is so important. Taking pressure off your skin is the first step. That means changing positions often, supporting your body, and avoiding rubbing and sliding. Keeping your skin clean, eating well, and stretching your joints and muscles can also help prevent pressure sores. Be sure to check your skin daily, too.  Change positions often  Changing positions often allows blood to get to your skin and keep the tissue healthy.  In a chair  · Shift weight from side to side at least once an hour--every 15 minutes if possible.  · Ask about pads and cushions that can reduce pressure on your skin.  In bed  · Change positions at least every 2 hours, more often if possible.  · Use lightweight sheets and blankets to reduce pressure from above.  · Ask about special pads and mattresses that spread pressure over a larger area of your body.  Support your body  Supporting your body spreads pressure over a larger area.  In a chair  · Lightly cushion your back and buttocks. Dont use doughnut-type cushions. They can cut off the blood supply to your skin.  · Lightly pad the footrest on your wheelchair.  In bed  · When lying on your back, put pillows under your lower calves and ankles. Keep your elbows slightly bent.  · When lying on your side, put pillows behind your back, between your legs, and between your ankles. Keep elbows and knees slightly bent.  Avoid rubbing and sliding  Rubbing (friction) and sliding (shear) cause the skin to break down more easily.  In a chair  · Keep your feet on a footrest, so your thighs are horizontal. This keeps your buttocks from sliding forward.  · Support your shoulder blades and back with a pillow.  In bed  · Keep your sheets smooth, dry, and free of crumbs. Use a sheepskin pad to prevent rubbing.  · Keep your feet and head slightly raised to avoid sliding.  When on bed rest, dont raise your head more than 30 degrees, except when needed for some medical conditions or for eating.   Keep your skin clean  Keeping your skin clean and dry also helps prevent pressure sores.  · Keep your skin free of sweat, urine, or wound drainage.  · Apply protective creams and use absorbent pads if you don't have bladder or bowel control.  · Check your skin twice a day for signs of breakdown.  Eat healthy and move around  If you are in a bed or a wheelchair most or all of the time, you need to:  · Eat enough calories to stay at a stable weight.  · Get plenty of protein, vitamins, and iron, and drink lots of fluids each day.  · Get out of your bed or chair as much as possible.  Check your skin twice a day  Do skin checks each day as part of your daily routine. Skin breakdown starts with slight changes, but can progress very quickly.   · Look for redness, bruises, cuts, and other irritations, especially over bony areas.  · Look for scabbing, blistering, or open areas on the surface of your skin. These are more serious and must be treated right away.     Date Last Reviewed: 8/13/2015  © 1123-9151 [employer]. [address] [place] [address], [place] [address]. All rights reserved. This information is not intended as a substitute for professional medical care. Always follow your healthcare professional's instructions.

## 2019-09-18 NOTE — PROGRESS NOTES
Subjective:       Patient ID: Jerica Rios is a 79 y.o. female.    Chief Complaint: ER f/u    Ms. Rios presents today for an ED F/U. Had a fall when attempting to transfer from her bed to the bedside commode on 9/13. Landed on both knees causing acute bilateral knee pain particularly with weight bearing. She was taken to the ED same day where she was evaluated. No loss of consciousness. Bilateral knee xrays showed no acute findings. No swelling or effusions. The pain is unchanged today. She is planning to follow up with orthopedics. If cleared by them, can consider PT. Has had more falls since the ED visit.     Son is here with her today. Denies any acute changes in mental status or fever. He did note dark urine in bedside commode, concerned with blood. Also reported that she may have sacral skin breakdown. Those who assist with her personal hygiene noted this. Spends most of her time in a wheelchair or the bed. Would greatly benefit from a wheelchair with a gel cushion to prevent skin breakdown. Also needs walker for safe ambulation assistance.     Patient Active Problem List   Diagnosis    Adrenal nodule    Allergic rhinosinusitis    GERD (gastroesophageal reflux disease)    Depression with anxiety    Chronic pain    Atherosclerosis of native coronary artery without angina pectoris    DEVONTE (obstructive sleep apnea), not on Rx due to cost, off 2015, diagnosed in 4/2014    Presbylarynx, lower pitch, hoarse voice    Thyroid nodule, see CT April 2014    S/P coronary artery stent placement, multiple, last in [facility] 2010    Type 2 diabetes mellitus, controlled    Hyperlipidemia with target LDL less than 70, baseline LDL not available    Obesity, Class I, BMI 30-34.9, today 31.4    DDD (degenerative disc disease), lumbar    Hypertension associated with diabetes    Abdominal obesity    Sedentary lifestyle    Primary osteoarthritis involving multiple joints    WELLS (dyspnea on exertion)     Diastolic dysfunction    Thrombocytosis    Acute otitis externa of left ear    Chronic fatigue    Hypothyroidism due to acquired atrophy of thyroid    Dementia without behavioral disturbance    Iron deficiency    Osteoporosis       Review of Systems   Constitutional: Positive for activity change and fatigue. Negative for fever and unexpected weight change.   HENT: Negative for hearing loss, rhinorrhea and trouble swallowing.    Eyes: Negative for discharge and visual disturbance.   Respiratory: Negative for chest tightness and wheezing.    Cardiovascular: Negative for chest pain and palpitations.   Gastrointestinal: Negative for blood in stool, constipation, diarrhea and vomiting.   Endocrine: Positive for polyuria. Negative for polydipsia.   Genitourinary: Positive for frequency and hematuria. Negative for difficulty urinating, dysuria and menstrual problem.   Musculoskeletal: Positive for arthralgias and joint swelling. Negative for neck pain.   Neurological: Positive for weakness. Negative for headaches.   Psychiatric/Behavioral: Positive for confusion (hx of dementia, son denies any acute changes) and dysphoric mood.       Objective:      Physical Exam   Constitutional: She appears well-developed. No distress.   Cardiovascular: Normal rate, regular rhythm and normal heart sounds.   Pulmonary/Chest: Effort normal and breath sounds normal. She has no wheezes.   Musculoskeletal:        Right knee: Tenderness found.        Left knee: Tenderness found.   Neurological: She is alert.   Skin: Skin is warm and dry.   No skin breakdown to sacrum or coccyx    Psychiatric: She has a normal mood and affect.   Nursing note and vitals reviewed.      Assessment:       1. Hematuria, unspecified type    2. Multiple falls    3. Acute bilateral knee pain    4. Osteoporosis, unspecified osteoporosis type, unspecified pathological fracture presence    5. Chronic pain syndrome    6. Debility        Plan:       Jerica was seen  today for er f/u.    Diagnoses and all orders for this visit:    Hematuria, unspecified type  -     Urine culture  -     POCT URINE DIPSTICK WITHOUT MICROSCOPE  -     ciprofloxacin HCl (CIPRO) 250 MG tablet; Take 1 tablet (250 mg total) by mouth 2 (two) times daily. for 3 days  Await culture, start antibiotic    Multiple falls  -     CBC auto differential; Future  -     Comprehensive metabolic panel; Future  Check labs  Await urine culture to make sure UTI not contributing  If workup negative and cleared by ortho, agree with PT  Educated on use of assistive device and fall precautions  Patient would benefit from assistance with transfers    Acute bilateral knee pain  -     diclofenac sodium (VOLTAREN) 1 % Gel; Apply 2 g topically 3 (three) times daily as needed.  -     Ambulatory referral/consult to Orthopedics; Future  Trial of typical voltaren for pain    Osteoporosis, unspecified osteoporosis type, unspecified pathological fracture presence  -     ibandronate (BONIVA) 150 mg tablet; Take 1 tablet (150 mg total) by mouth every 30 days.  Requesting refill    Chronic pain syndrome  Previously on tramadol for Dr. Cole    Debility  -     WHEELCHAIR FOR HOME USE  -     WALKER FOR HOME USE  See above   No skin breakdown present  Educated on frequent position changes, use of a donut to relieve pressure, turing q2hr while in the bed, propping with pillows to prevent skin breakdown     F/U 1 week   Educated son if any acute changes, notify clinic or go to ED for evaluation

## 2019-09-22 LAB — BACTERIA UR CULT: NO GROWTH

## 2019-09-23 ENCOUNTER — DOCUMENTATION ONLY (OUTPATIENT)
Dept: FAMILY MEDICINE | Facility: CLINIC | Age: 79
End: 2019-09-23

## 2019-09-23 ENCOUNTER — TELEPHONE (OUTPATIENT)
Dept: FAMILY MEDICINE | Facility: CLINIC | Age: 79
End: 2019-09-23

## 2019-09-23 NOTE — PROGRESS NOTES
Spoke with patient son regarding her results and current symptoms, aydee stated the patient urine is not as dark as before and patient is not complaining of any symptoms.     He also stated the pharmacy said we need to start a PA for the patient's Voltaren gel, I informed him that I will send a message to our nurse so we can start this process.

## 2019-09-23 NOTE — TELEPHONE ENCOUNTER
Spoke with son (aydee) regarding patient results, he stated it looks like she is getting better, her urine is not as dark as it was before and patient is not complaining of any symptoms.     He did have a question about the voltaren gel that was ordered, aydee said the pharmacy told him that they will need a PA to approve the medication

## 2019-09-23 NOTE — TELEPHONE ENCOUNTER
----- Message from Lupe Mukherjee NP sent at 9/23/2019  6:50 AM CDT -----  No growth on urine culture. How is she feeling?

## 2019-09-25 ENCOUNTER — OFFICE VISIT (OUTPATIENT)
Dept: FAMILY MEDICINE | Facility: CLINIC | Age: 79
End: 2019-09-25
Payer: MEDICARE

## 2019-09-25 ENCOUNTER — LAB VISIT (OUTPATIENT)
Dept: LAB | Facility: HOSPITAL | Age: 79
End: 2019-09-25
Attending: NURSE PRACTITIONER
Payer: MEDICARE

## 2019-09-25 ENCOUNTER — TELEPHONE (OUTPATIENT)
Dept: FAMILY MEDICINE | Facility: CLINIC | Age: 79
End: 2019-09-25

## 2019-09-25 VITALS
BODY MASS INDEX: 38.14 KG/M2 | DIASTOLIC BLOOD PRESSURE: 62 MMHG | TEMPERATURE: 99 F | HEART RATE: 82 BPM | WEIGHT: 194.25 LBS | OXYGEN SATURATION: 96 % | SYSTOLIC BLOOD PRESSURE: 130 MMHG | HEIGHT: 60 IN

## 2019-09-25 DIAGNOSIS — R20.2 PARESTHESIA OF SKIN: ICD-10-CM

## 2019-09-25 DIAGNOSIS — M25.561 ACUTE BILATERAL KNEE PAIN: Primary | ICD-10-CM

## 2019-09-25 DIAGNOSIS — I15.2 HYPERTENSION ASSOCIATED WITH DIABETES: ICD-10-CM

## 2019-09-25 DIAGNOSIS — M25.562 ACUTE BILATERAL KNEE PAIN: Primary | ICD-10-CM

## 2019-09-25 DIAGNOSIS — G89.4 CHRONIC PAIN SYNDROME: ICD-10-CM

## 2019-09-25 DIAGNOSIS — E11.59 HYPERTENSION ASSOCIATED WITH DIABETES: ICD-10-CM

## 2019-09-25 DIAGNOSIS — R29.6 MULTIPLE FALLS: ICD-10-CM

## 2019-09-25 DIAGNOSIS — R53.82 CHRONIC FATIGUE: ICD-10-CM

## 2019-09-25 DIAGNOSIS — D50.9 IRON DEFICIENCY ANEMIA, UNSPECIFIED IRON DEFICIENCY ANEMIA TYPE: ICD-10-CM

## 2019-09-25 LAB
BASOPHILS # BLD AUTO: 0.06 K/UL (ref 0–0.2)
BASOPHILS NFR BLD: 0.5 % (ref 0–1.9)
DIFFERENTIAL METHOD: ABNORMAL
EOSINOPHIL # BLD AUTO: 0.2 K/UL (ref 0–0.5)
EOSINOPHIL NFR BLD: 2 % (ref 0–8)
ERYTHROCYTE [DISTWIDTH] IN BLOOD BY AUTOMATED COUNT: 15.9 % (ref 11.5–14.5)
HCT VFR BLD AUTO: 35.8 % (ref 37–48.5)
HGB BLD-MCNC: 12.5 G/DL (ref 12–16)
IMM GRANULOCYTES # BLD AUTO: 0.03 K/UL (ref 0–0.04)
IMM GRANULOCYTES NFR BLD AUTO: 0.3 % (ref 0–0.5)
LYMPHOCYTES # BLD AUTO: 2.8 K/UL (ref 1–4.8)
LYMPHOCYTES NFR BLD: 24.5 % (ref 18–48)
MCH RBC QN AUTO: 32.2 PG (ref 27–31)
MCHC RBC AUTO-ENTMCNC: 34.9 G/DL (ref 32–36)
MCV RBC AUTO: 92 FL (ref 82–98)
MONOCYTES # BLD AUTO: 1 K/UL (ref 0.3–1)
MONOCYTES NFR BLD: 8.6 % (ref 4–15)
NEUTROPHILS # BLD AUTO: 7.2 K/UL (ref 1.8–7.7)
NEUTROPHILS NFR BLD: 64.1 % (ref 38–73)
NRBC BLD-RTO: 0 /100 WBC
PLATELET # BLD AUTO: 384 K/UL (ref 150–350)
PMV BLD AUTO: 11.3 FL (ref 9.2–12.9)
RBC # BLD AUTO: 3.88 M/UL (ref 4–5.4)
WBC # BLD AUTO: 11.28 K/UL (ref 3.9–12.7)

## 2019-09-25 PROCEDURE — 3078F PR MOST RECENT DIASTOLIC BLOOD PRESSURE < 80 MM HG: ICD-10-PCS | Mod: CPTII,S$GLB,, | Performed by: NURSE PRACTITIONER

## 2019-09-25 PROCEDURE — 85025 COMPLETE CBC W/AUTO DIFF WBC: CPT

## 2019-09-25 PROCEDURE — 36415 COLL VENOUS BLD VENIPUNCTURE: CPT | Mod: PO

## 2019-09-25 PROCEDURE — 83540 ASSAY OF IRON: CPT

## 2019-09-25 PROCEDURE — 3075F SYST BP GE 130 - 139MM HG: CPT | Mod: CPTII,S$GLB,, | Performed by: NURSE PRACTITIONER

## 2019-09-25 PROCEDURE — 1101F PT FALLS ASSESS-DOCD LE1/YR: CPT | Mod: CPTII,S$GLB,, | Performed by: NURSE PRACTITIONER

## 2019-09-25 PROCEDURE — 82728 ASSAY OF FERRITIN: CPT

## 2019-09-25 PROCEDURE — 3075F PR MOST RECENT SYSTOLIC BLOOD PRESS GE 130-139MM HG: ICD-10-PCS | Mod: CPTII,S$GLB,, | Performed by: NURSE PRACTITIONER

## 2019-09-25 PROCEDURE — 99999 PR PBB SHADOW E&M-EST. PATIENT-LVL IV: CPT | Mod: PBBFAC,,, | Performed by: NURSE PRACTITIONER

## 2019-09-25 PROCEDURE — 99213 OFFICE O/P EST LOW 20 MIN: CPT | Mod: S$GLB,,, | Performed by: NURSE PRACTITIONER

## 2019-09-25 PROCEDURE — 3078F DIAST BP <80 MM HG: CPT | Mod: CPTII,S$GLB,, | Performed by: NURSE PRACTITIONER

## 2019-09-25 PROCEDURE — 99999 PR PBB SHADOW E&M-EST. PATIENT-LVL IV: ICD-10-PCS | Mod: PBBFAC,,, | Performed by: NURSE PRACTITIONER

## 2019-09-25 PROCEDURE — 1101F PR PT FALLS ASSESS DOC 0-1 FALLS W/OUT INJ PAST YR: ICD-10-PCS | Mod: CPTII,S$GLB,, | Performed by: NURSE PRACTITIONER

## 2019-09-25 PROCEDURE — 99213 PR OFFICE/OUTPT VISIT, EST, LEVL III, 20-29 MIN: ICD-10-PCS | Mod: S$GLB,,, | Performed by: NURSE PRACTITIONER

## 2019-09-25 NOTE — PROGRESS NOTES
Subjective:       Patient ID: Jerica Rios is a 79 y.o. female.    Chief Complaint: Follow-up    Ms. Rios presents today for a 1 week F/U appointment. Did not get labs as ordered at last visit. Overall her son states she is doing much better. She was treated for a UTI at the last visit, urine culture showed no growth. Son reported improvement in urine, not as dark and not complaining of any symptoms. Denies fever. Still has bilateral knee pain- Voltaren gel not covered by the insurance.  Started ambulating with Rolator. No falls. Knee pain improving but comes and goes. Worse at night. She is vitally stable today. Complains of fatigue, numbness, tingling in the feet, wants B12 checked. Has history of chronic fatigue.     Patient Active Problem List   Diagnosis    Adrenal nodule    Allergic rhinosinusitis    GERD (gastroesophageal reflux disease)    Depression with anxiety    Chronic pain    Atherosclerosis of native coronary artery without angina pectoris    DEVONTE (obstructive sleep apnea), not on Rx due to cost, off 2015, diagnosed in 4/2014    Presbylarynx, lower pitch, hoarse voice    Thyroid nodule, see CT April 2014    S/P coronary artery stent placement, multiple, last in [facility] 2010    Type 2 diabetes mellitus, controlled    Hyperlipidemia with target LDL less than 70, baseline LDL not available    Obesity, Class I, BMI 30-34.9, today 31.4    DDD (degenerative disc disease), lumbar    Hypertension associated with diabetes    Abdominal obesity    Sedentary lifestyle    Primary osteoarthritis involving multiple joints    WELLS (dyspnea on exertion)    Diastolic dysfunction    Thrombocytosis    Acute otitis externa of left ear    Chronic fatigue    Hypothyroidism due to acquired atrophy of thyroid    Dementia without behavioral disturbance    Iron deficiency    Osteoporosis     Lab Results   Component Value Date    WBC 9.26 08/07/2019    HGB 11.6 (L) 08/07/2019    HCT 36.4 (L)  08/07/2019     08/07/2019    CHOL 109 (L) 08/07/2019    TRIG 75 08/07/2019    HDL 42 08/07/2019    ALT 20 08/07/2019    AST 17 08/07/2019     08/07/2019    K 3.8 08/07/2019     08/07/2019    CREATININE 1.0 08/07/2019    BUN 18 08/07/2019    CO2 26 08/07/2019    TSH 2.062 08/07/2019    INR 1.0 10/16/2013    HGBA1C 5.5 08/07/2019       Current Outpatient Medications:     amLODIPine (NORVASC) 10 MG tablet, Take 1 tablet (10 mg total) by mouth once daily., Disp: 90 tablet, Rfl: 0    atorvastatin (LIPITOR) 80 MG tablet, TAKE 1 TABLET (80 MG TOTAL) BY MOUTH ONCE DAILY., Disp: 90 tablet, Rfl: 3    baclofen (LIORESAL) 10 MG tablet, START WITH 1/2 TABLET EVERY EVENING X 1 WEEK THEN INCREASE TO 1 TABLET EVERY EVENING, Disp: 90 tablet, Rfl: 0    calcium-vitamin D (CALCIUM 600 + D,3,) 600 mg(1,500mg) -400 unit Tab, Take 1 tablet by mouth 2 (two) times daily., Disp: , Rfl:     clonazePAM (KLONOPIN) 0.5 MG tablet, Take 1 tablet (0.5 mg total) by mouth 2 (two) times daily as needed for Anxiety., Disp: 30 tablet, Rfl: 3    clopidogrel (PLAVIX) 75 mg tablet, TAKE 1 TABLET BY MOUTH EVERY DAY -MUST FOLLOW UP WITH CARDIOLOGIST, Disp: 30 tablet, Rfl: 11    esomeprazole (NEXIUM) 20 MG capsule, Take 20 mg by mouth once daily. Patient takes OTC nexium, Disp: , Rfl:     ferrous sulfate 324 mg (65 mg iron) TbEC, Take 325 mg by mouth 2 (two) times daily., Disp: , Rfl:     hydroCHLOROthiazide (HYDRODIURIL) 25 MG tablet, Take 1 tablet (25 mg total) by mouth once daily., Disp: 90 tablet, Rfl: 3    ibandronate (BONIVA) 150 mg tablet, Take 1 tablet (150 mg total) by mouth every 30 days., Disp: 1 tablet, Rfl: 11    irbesartan (AVAPRO) 300 MG tablet, TAKE 1 TABLET BY MOUTH EVERY DAY IN THE EVENING, Disp: 90 tablet, Rfl: 2    levothyroxine (SYNTHROID) 50 MCG tablet, TAKE 1 TABLET BY MOUTH EVERY DAY IN THE MORNING ON EMPTY STOMACH, Disp: 90 tablet, Rfl: 3    magnesium oxide (MAG-OXIDE) 400 mg tablet, Take 1 tablet (400  mg total) by mouth once daily. Every day, Disp: 90 tablet, Rfl: 3    melatonin 3 mg Tab, Take 3 mg by mouth nightly., Disp: , Rfl:     memantine (NAMENDA) 10 MG Tab, Take 1 tablet (10 mg total) by mouth 2 (two) times daily., Disp: 60 tablet, Rfl: 11    metFORMIN (GLUCOPHAGE) 500 MG tablet, TAKE 1 TABLET (500 MG TOTAL) BY MOUTH 2 (TWO) TIMES DAILY WITH MEALS., Disp: 180 tablet, Rfl: 3    MULTIVITAMIN ORAL, Take 1 Package by mouth once daily. Pt takes Diabetic Vitamin Pack, Disp: , Rfl:     nitroGLYCERIN (NITROSTAT) 0.4 MG SL tablet, Place 1 tablet (0.4 mg total) under the tongue every 5 (five) minutes as needed for Chest pain. As directed PRN (Patient taking differently: Place 0.4 mg under the tongue every 5 (five) minutes as needed for Chest pain. As directed for chest pain.  Seek medical help if pain is not relieved by the third dose.), Disp: 100 tablet, Rfl: 3    potassium chloride (KLOR-CON SPRINKLE) 10 MEQ CpSR, Take 1 capsule (10 mEq total) by mouth once daily., Disp: 90 capsule, Rfl: 3    ranitidine (ZANTAC) 150 MG tablet, Take 150 mg by mouth 2 (two) times daily., Disp: , Rfl:     rivastigmine tartrate (EXELON) 3 MG capsule, Take 1 capsule (3 mg total) by mouth 2 (two) times daily., Disp: 180 capsule, Rfl: 3    sertraline (ZOLOFT) 100 MG tablet, Take 1 tablet (100 mg total) by mouth once daily., Disp: 90 tablet, Rfl: 3    traMADol (ULTRAM) 50 mg tablet, Take 1 tablet (50 mg total) by mouth every 6 (six) hours as needed for Pain., Disp: 30 tablet, Rfl: 3    turmeric root extract 500 mg Cap, Take 1 capsule by mouth 3 (three) times daily. , Disp: , Rfl:     vitamin D 1000 units Tab, Take 1,000 Units by mouth once daily. , Disp: , Rfl:     Review of Systems   Constitutional: Negative for activity change, fever and unexpected weight change.   HENT: Negative for hearing loss, rhinorrhea and trouble swallowing.    Eyes: Negative for discharge and visual disturbance.   Respiratory: Negative for chest  tightness and wheezing.    Cardiovascular: Negative for chest pain and palpitations.   Gastrointestinal: Negative for blood in stool, constipation, diarrhea and vomiting.   Endocrine: Negative for polydipsia and polyuria.   Genitourinary: Negative for difficulty urinating, dysuria and hematuria.   Musculoskeletal: Positive for arthralgias and joint swelling. Negative for neck pain.   Neurological: Positive for weakness (generalized, no change). Negative for headaches.   Psychiatric/Behavioral: Positive for confusion (not acute changes, hx of dementia). Negative for dysphoric mood.       Objective:      Physical Exam   Constitutional: She is oriented to person, place, and time. She appears well-developed and well-nourished.   Cardiovascular: Normal rate, regular rhythm and normal heart sounds.   Pulmonary/Chest: Effort normal and breath sounds normal. She has no wheezes. She has no rales.   Musculoskeletal: She exhibits no edema.        Right knee: No tenderness found.        Left knee: No tenderness found.   In wheelchair   Neurological: She is alert and oriented to person, place, and time.   Skin: Skin is warm and dry.   Nursing note and vitals reviewed.      Assessment:       1. Acute bilateral knee pain    2. Multiple falls    3. Chronic pain syndrome    4. Hypertension associated with diabetes    5. Chronic fatigue    6. Paresthesia of skin         Plan:       Jerica was seen today for follow-up.    Diagnoses and all orders for this visit:    Acute bilateral knee pain  Improving  Recommend PT if cleared by ortho  Avoid NSAIDs, on plavix  May take tylenol PRN    Multiple falls  No further falls  Continue close fall precautions, always use assistive device    Hypertension associated with diabetes  Controlled today    Chronic fatigue  -     Vitamin B12; Future  Check B12, recheck CBC, iron studies. Contributing?  Reschedule missed labs  Screen and treat as needed    Paresthesia of skin   -     Vitamin B12;  Future  Screen and treat as needed    F/U based on labs

## 2019-09-26 LAB
FERRITIN SERPL-MCNC: 115 NG/ML (ref 20–300)
IRON SERPL-MCNC: 71 UG/DL (ref 30–160)
SATURATED IRON: 22 % (ref 20–50)
TOTAL IRON BINDING CAPACITY: 324 UG/DL (ref 250–450)
TRANSFERRIN SERPL-MCNC: 219 MG/DL (ref 200–375)

## 2019-09-30 ENCOUNTER — TELEPHONE (OUTPATIENT)
Dept: ORTHOPEDICS | Facility: CLINIC | Age: 79
End: 2019-09-30

## 2019-09-30 NOTE — TELEPHONE ENCOUNTER
Called patient. No answer, LVM advising to please move appointment to the morning on 9/2. Dr Nair will be out that afternoon. Advised to please return call.

## 2019-10-04 ENCOUNTER — TELEPHONE (OUTPATIENT)
Dept: FAMILY MEDICINE | Facility: CLINIC | Age: 79
End: 2019-10-04

## 2019-10-04 NOTE — TELEPHONE ENCOUNTER
Spoke with davey, informed her that I spoke with the patient son (aydee) and he said they will get the Wheelchair.

## 2019-10-04 NOTE — TELEPHONE ENCOUNTER
----- Message from Shelli Wolfe sent at 10/4/2019  2:56 PM CDT -----  Type: Needs Medical Advice    Who Called:  Juan Miguel [facility] Call Back Number: [phone]  Additional Information: Requesting to speak with nurse or doctor concerning the ordering of a wheelchair or rollator for patient/needs advice on which one would benefit patient better due to  only one can be ordered/please call back to advise.

## 2019-10-07 ENCOUNTER — HOSPITAL ENCOUNTER (EMERGENCY)
Facility: HOSPITAL | Age: 79
Discharge: HOME | End: 2019-10-07
Attending: EMERGENCY MEDICINE
Payer: MEDICARE

## 2019-10-07 VITALS
RESPIRATION RATE: 16 BRPM | SYSTOLIC BLOOD PRESSURE: 134 MMHG | DIASTOLIC BLOOD PRESSURE: 66 MMHG | TEMPERATURE: 99 F | OXYGEN SATURATION: 98 % | BODY MASS INDEX: 37.11 KG/M2 | WEIGHT: 190 LBS | HEART RATE: 65 BPM

## 2019-10-07 DIAGNOSIS — M17.11 PRIMARY OSTEOARTHRITIS OF RIGHT KNEE: ICD-10-CM
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
## 2019-10-07 DIAGNOSIS — S80.01XA CONTUSION OF RIGHT KNEE, INITIAL ENCOUNTER: Primary | ICD-10-CM

## 2019-10-07 PROCEDURE — 99283 EMERGENCY DEPT VISIT LOW MDM: CPT | Mod: 25

## 2019-10-07 NOTE — ED PROVIDER NOTES
"Encounter Date: 10/7/2019    SCRIBE #1 NOTE: ICherrie, am scribing for, and in the presence of, Bear Robles MD.       History     Chief Complaint   Patient presents with    Fall     fell out of bed c/o RIGHT knee pain       Time seen by provider: 12:04 PM on 10/07/2019    Jerica Rios is a 79 y.o. female who presents to the ED via EMS with complaints of right knee pain s/p a fall that occurred immediately PTA. The patient states her "leg gave out" causing her to land on both knees. She denies numbness or weakness. Denies back pain, neck pain, hip pain, chest pain, abdominal pain, or onset of any other new symptoms currently. The patient has no other medical concerns or complaints at this moment. PMHx includes DM, HTN, CAD, and HLD. SHx includes cholecystectomy, coronary stent placement, and ovarian cyst removal. Known drug allergies includes Amoxicillin, hydrocodone, meperidine, propoxyphene, and sulfa.     The history is provided by the patient.     Review of patient's allergies indicates:   Allergen Reactions    Amoxicillin-pot clavulanate      Other reaction(s): CONSTIPATION    Hydrocodone-acetaminophen Other (See Comments)     Other reaction(s): irritable  Other reaction(s): aggressive    Meperidine Other (See Comments)     Other reaction(s): irritability  Other reaction(s): aggressiveness    Propoxyphene n-acetaminophen      Other reaction(s): irritable  Other reaction(s): aggressive    Sulfa (sulfonamide antibiotics)      Other reaction(s): Rash     Past Medical History:   Diagnosis Date    Allergy     Arthritis     Carotid artery disease     Coronary artery disease     Diabetes mellitus     Diabetes mellitus, type 2     Fall at home     GERD (gastroesophageal reflux disease)     Headache(784.0)     Hyperlipidemia     Hypertension     Mental disorder     Obesity     Right rib fracture     Snoring     Toe fracture, right      Past Surgical History:   Procedure Laterality " Date    CHOLECYSTECTOMY      CORONARY STENT PLACEMENT      x 5    CYST REMOVAL      sebaceous cyst from back    EYE SURGERY      OVARIAN CYST REMOVAL      SINUS SURGERY      TONSILLECTOMY       Family History   Problem Relation Age of Onset    Heart disease Mother     Diabetes Mother     Cancer Mother         breast cancer    Heart disease Father     Heart disease Sister     Stroke Brother     Dementia Brother     Heart disease Brother      Social History     Tobacco Use    Smoking status: Never Smoker    Smokeless tobacco: Never Used   Substance Use Topics    Alcohol use: No     Frequency: Never     Drinks per session: Patient refused     Binge frequency: Never    Drug use: No     Review of Systems   Constitutional: Negative for chills and fever.   HENT: Negative for congestion.    Respiratory: Negative for cough and shortness of breath.    Cardiovascular: Negative for chest pain.   Gastrointestinal: Negative for abdominal pain, nausea and vomiting.   Genitourinary: Negative for difficulty urinating.   Musculoskeletal: Positive for arthralgias (right knee). Negative for joint swelling.   Skin: Negative for pallor and rash.   Neurological: Negative for weakness and numbness.   Hematological: Does not bruise/bleed easily.   Psychiatric/Behavioral: The patient is not nervous/anxious.        Physical Exam     Initial Vitals [10/07/19 1214]   BP Pulse Resp Temp SpO2   134/66 65 16 98.9 °F (37.2 °C) 98 %      MAP       --         Physical Exam    Nursing note and vitals reviewed.  Constitutional: She appears well-developed and well-nourished. She is not diaphoretic. No distress.   HENT:   Head: Normocephalic and atraumatic.   Eyes: Conjunctivae are normal.   Neck: Normal range of motion.   Cardiovascular: Normal rate, regular rhythm and normal heart sounds. Exam reveals no gallop and no friction rub.    No murmur heard.  Pulmonary/Chest: Effort normal and breath sounds normal. No respiratory distress.  She has no wheezes. She has no rhonchi. She has no rales.   Musculoskeletal: Normal range of motion. She exhibits tenderness.        Right knee: She exhibits no swelling, no ecchymosis, no LCL laxity and no MCL laxity. No medial joint line and no lateral joint line tenderness noted.   Right anteromedial knee pain with no associated tenderness, swelling, bruising, or laxity.    Neurological: She is alert and oriented to person, place, and time.   Skin: Skin is warm and dry. No erythema.   Psychiatric: She has a normal mood and affect.         ED Course   Procedures  Labs Reviewed - No data to display       Imaging Results          X-Ray Knee 3 View Right (In process)                  Medical Decision Making:   History:   Old Medical Records: I decided to obtain old medical records.  Clinical Tests:   Radiological Study: Reviewed and Ordered  ED Management:  79-year-old female presents via ambulance after her right knee gave out.  She has no effusion or laxity with no evidence of dislocation.  X-rays independently interpreted by me demonstrate osteoarthritis with no fracture or dislocation.  The symptoms are strongly suggestive of chronic osteoarthritis.       APC / Resident Notes:   I, Dr. Bear Robles III, personally performed the services described in this documentation. All medical record entries made by the scribe were at my direction and in my presence.  I have reviewed the chart and agree that the record reflects my personal performance and is accurate and complete       Scribe Attestation:   Scribe #1: I performed the above scribed service and the documentation accurately describes the services I performed. I attest to the accuracy of the note.               Clinical Impression:       ICD-10-CM ICD-9-CM   1. Contusion of right knee, initial encounter S80.01XA 924.11   2. Fall W19.XXXA E888.9   3. Primary osteoarthritis of right knee M17.11 715.16         Disposition:   Disposition: Discharged  Condition:  Stable                        Bear Robles III, MD  10/07/19 1424

## 2019-10-09 ENCOUNTER — PATIENT MESSAGE (OUTPATIENT)
Dept: ORTHOPEDICS | Facility: CLINIC | Age: 79
End: 2019-10-09

## 2019-10-09 ENCOUNTER — PATIENT MESSAGE (OUTPATIENT)
Dept: FAMILY MEDICINE | Facility: CLINIC | Age: 79
End: 2019-10-09

## 2019-10-14 ENCOUNTER — TELEPHONE (OUTPATIENT)
Dept: ORTHOPEDICS | Facility: CLINIC | Age: 79
End: 2019-10-14

## 2019-10-14 NOTE — TELEPHONE ENCOUNTER
Called and spoke w/ pt's son.  Advised that Dr. Nair will be in surgery 10/16 and we need to reschedule her appt.  He can only bring her on Wednesday, so scheduled her to see Dr. Hooper on 10/23 @ 3:30.

## 2019-10-18 ENCOUNTER — TELEPHONE (OUTPATIENT)
Dept: NEUROLOGY | Facility: CLINIC | Age: 79
End: 2019-10-18

## 2019-10-18 NOTE — TELEPHONE ENCOUNTER
----- Message from Kay Palmer sent at 10/18/2019  2:21 PM CDT -----  Contact: Matthew (son) @ [phone]  Caller is returning a missed call from Dr. Humphreys's office, please call

## 2019-10-18 NOTE — TELEPHONE ENCOUNTER
Spoke with pt son Matthew. He confirmed pt will need a refill on the namenda, pt will need an appt since it has been over a year since she last seen Lesly.   He voiced understanding stating he will like to schedule appt in [place] and his only off day is wednesday's. He was informed Lesly is only in [place] on Monday's, [facility] on [facility]. He states he will contact the office to schedule appt after making arrangements with his employer because he is his mother's only transportation.

## 2019-10-18 NOTE — TELEPHONE ENCOUNTER
----- Message from Abram Poole sent at 10/18/2019  2:24 PM CDT -----  Contact: Mr. Rios (son) @ [phone]  Mr. Rios is returning your call

## 2019-10-23 ENCOUNTER — OFFICE VISIT (OUTPATIENT)
Dept: ORTHOPEDICS | Facility: CLINIC | Age: 79
End: 2019-10-23
Payer: MEDICARE

## 2019-10-23 VITALS — HEIGHT: 60 IN | BODY MASS INDEX: 37.3 KG/M2 | WEIGHT: 190 LBS

## 2019-10-23 DIAGNOSIS — M25.562 ACUTE BILATERAL KNEE PAIN: ICD-10-CM

## 2019-10-23 DIAGNOSIS — M25.561 ACUTE BILATERAL KNEE PAIN: ICD-10-CM

## 2019-10-23 PROCEDURE — 1101F PT FALLS ASSESS-DOCD LE1/YR: CPT | Mod: CPTII,S$GLB,, | Performed by: ORTHOPAEDIC SURGERY

## 2019-10-23 PROCEDURE — 1101F PR PT FALLS ASSESS DOC 0-1 FALLS W/OUT INJ PAST YR: ICD-10-PCS | Mod: CPTII,S$GLB,, | Performed by: ORTHOPAEDIC SURGERY

## 2019-10-23 PROCEDURE — 99213 PR OFFICE/OUTPT VISIT, EST, LEVL III, 20-29 MIN: ICD-10-PCS | Mod: S$GLB,,, | Performed by: ORTHOPAEDIC SURGERY

## 2019-10-23 PROCEDURE — 99999 PR PBB SHADOW E&M-EST. PATIENT-LVL III: ICD-10-PCS | Mod: PBBFAC,,, | Performed by: ORTHOPAEDIC SURGERY

## 2019-10-23 PROCEDURE — 99999 PR PBB SHADOW E&M-EST. PATIENT-LVL III: CPT | Mod: PBBFAC,,, | Performed by: ORTHOPAEDIC SURGERY

## 2019-10-23 PROCEDURE — 99213 OFFICE O/P EST LOW 20 MIN: CPT | Mod: S$GLB,,, | Performed by: ORTHOPAEDIC SURGERY

## 2019-10-23 RX ORDER — TRAMADOL HYDROCHLORIDE AND ACETAMINOPHEN 37.5; 325 MG/1; MG/1
1 TABLET, FILM COATED ORAL EVERY 6 HOURS PRN
Qty: 30 TABLET | Refills: 0 | Status: SHIPPED | OUTPATIENT
Start: 2019-10-23 | End: 2019-11-24 | Stop reason: SDUPTHER

## 2019-10-23 NOTE — LETTER
October 23, 2019      Lupe Mukherjee, NP  [address] [facility] [address]           [address]  [facility] [address]  Phone: [phone]          Patient: Jerica Rios   MR Number: [id number]   YOB: 1940   Date of Visit: 10/23/2019       Dear Lupe Mukherjee:    Thank you for referring Jerica Rios to me for evaluation. Attached you will find relevant portions of my assessment and plan of care.    If you have questions, please do not hesitate to call me. I look forward to following Jerica Rios along with you.    Sincerely,    Cooper Hooper MD    Enclosure  CC:  No Recipients    If you would like to receive this communication electronically, please contact [email or web address] or [phone] to request more information on [employer] access.    For providers and/or their staff who would like to refer a patient to [facility], please contact us through our one-stop-shop provider referral line, [facility] , at [phone].    If you feel you have received this communication in error or would no longer like to receive these types of communications, please e-mail [email or web address]

## 2019-10-23 NOTE — PROGRESS NOTES
10/23/2019    Past Medical History:   Diagnosis Date    Allergy     Arthritis     Carotid artery disease     Coronary artery disease     Diabetes mellitus     Diabetes mellitus, type 2     Fall at home     GERD (gastroesophageal reflux disease)     Headache(784.0)     Hyperlipidemia     Hypertension     Mental disorder     Obesity     Right rib fracture     Snoring     Toe fracture, right        Past Surgical History:   Procedure Laterality Date    CHOLECYSTECTOMY      CORONARY STENT PLACEMENT      x 5    CYST REMOVAL      sebaceous cyst from back    EYE SURGERY      OVARIAN CYST REMOVAL      SINUS SURGERY      TONSILLECTOMY         Current Outpatient Medications   Medication Sig    amLODIPine (NORVASC) 10 MG tablet Take 1 tablet (10 mg total) by mouth once daily.    atorvastatin (LIPITOR) 80 MG tablet TAKE 1 TABLET (80 MG TOTAL) BY MOUTH ONCE DAILY.    baclofen (LIORESAL) 10 MG tablet START WITH 1/2 TABLET EVERY EVENING X 1 WEEK THEN INCREASE TO 1 TABLET EVERY EVENING    calcium-vitamin D (CALCIUM 600 + D,3,) 600 mg(1,500mg) -400 unit Tab Take 1 tablet by mouth 2 (two) times daily.    clonazePAM (KLONOPIN) 0.5 MG tablet Take 1 tablet (0.5 mg total) by mouth 2 (two) times daily as needed for Anxiety.    clopidogrel (PLAVIX) 75 mg tablet TAKE 1 TABLET BY MOUTH EVERY DAY -MUST FOLLOW UP WITH CARDIOLOGIST    esomeprazole (NEXIUM) 20 MG capsule Take 20 mg by mouth once daily. Patient takes OTC nexium    ferrous sulfate 324 mg (65 mg iron) TbEC Take 325 mg by mouth 2 (two) times daily.    hydroCHLOROthiazide (HYDRODIURIL) 25 MG tablet Take 1 tablet (25 mg total) by mouth once daily.    ibandronate (BONIVA) 150 mg tablet Take 1 tablet (150 mg total) by mouth every 30 days.    irbesartan (AVAPRO) 300 MG tablet TAKE 1 TABLET BY MOUTH EVERY DAY IN THE EVENING    levothyroxine (SYNTHROID) 50 MCG tablet TAKE 1 TABLET BY MOUTH EVERY DAY IN THE MORNING ON EMPTY STOMACH    magnesium oxide  (MAG-OXIDE) 400 mg tablet Take 1 tablet (400 mg total) by mouth once daily. Every day    melatonin 3 mg Tab Take 3 mg by mouth nightly.    memantine (NAMENDA) 10 MG Tab Take 1 tablet (10 mg total) by mouth 2 (two) times daily.    metFORMIN (GLUCOPHAGE) 500 MG tablet TAKE 1 TABLET (500 MG TOTAL) BY MOUTH 2 (TWO) TIMES DAILY WITH MEALS.    MULTIVITAMIN ORAL Take 1 Package by mouth once daily. Pt takes Diabetic Vitamin Pack    nitroGLYCERIN (NITROSTAT) 0.4 MG SL tablet Place 1 tablet (0.4 mg total) under the tongue every 5 (five) minutes as needed for Chest pain. As directed PRN (Patient taking differently: Place 0.4 mg under the tongue every 5 (five) minutes as needed for Chest pain. As directed for chest pain.  Seek medical help if pain is not relieved by the third dose.)    potassium chloride (KLOR-CON SPRINKLE) 10 MEQ CpSR Take 1 capsule (10 mEq total) by mouth once daily.    ranitidine (ZANTAC) 150 MG tablet Take 150 mg by mouth 2 (two) times daily.    rivastigmine tartrate (EXELON) 3 MG capsule Take 1 capsule (3 mg total) by mouth 2 (two) times daily.    sertraline (ZOLOFT) 100 MG tablet Take 1 tablet (100 mg total) by mouth once daily.    traMADol (ULTRAM) 50 mg tablet Take 1 tablet (50 mg total) by mouth every 6 (six) hours as needed for Pain.    turmeric root extract 500 mg Cap Take 1 capsule by mouth 3 (three) times daily.     vitamin D 1000 units Tab Take 1,000 Units by mouth once daily.      No current facility-administered medications for this visit.        Review of patient's allergies indicates:   Allergen Reactions    Amoxicillin-pot clavulanate      Other reaction(s): CONSTIPATION    Hydrocodone-acetaminophen Other (See Comments)     Other reaction(s): irritable  Other reaction(s): aggressive    Meperidine Other (See Comments)     Other reaction(s): irritability  Other reaction(s): aggressiveness    Propoxyphene n-acetaminophen      Other reaction(s): irritable  Other reaction(s):  aggressive    Sulfa (sulfonamide antibiotics)      Other reaction(s): Rash       Family History   Problem Relation Age of Onset    Heart disease Mother     Diabetes Mother     Cancer Mother         breast cancer    Heart disease Father     Heart disease Sister     Stroke Brother     Dementia Brother     Heart disease Brother        Social History     Socioeconomic History    Marital status:      Spouse name: Not on file    Number of children: Not on file    Years of education: Not on file    Highest education level: Not on file   Occupational History    Not on file   Social Needs    Financial resource strain: Not hard at all    Food insecurity:     Worry: Never true     Inability: Never true    Transportation needs:     Medical: No     Non-medical: No   Tobacco Use    Smoking status: Never Smoker    Smokeless tobacco: Never Used   Substance and Sexual Activity    Alcohol use: No     Frequency: Never     Drinks per session: Patient refused     Binge frequency: Never    Drug use: No    Sexual activity: Not Currently     Partners: Male   Lifestyle    Physical activity:     Days per week: 0 days     Minutes per session: 0 min    Stress: To some extent   Relationships    Social connections:     Talks on phone: More than three times a week     Gets together: Once a week     Attends [employer] service: Not on file     Active member of club or organization: No     Attends meetings of clubs or organizations: Never     Relationship status:    Other Topics Concern    Not on file   Social History Narrative    Not on file       Chief Complaint:   Chief Complaint   Patient presents with    Right Knee - Pain     Osteoarthritis Right Knee. Had Euflexxa inj to the right knee completed on 07/25/2018 by Dr. Nair    Left Knee - Pain     Osteoarthritis Left Knee. Had Euflexxa inj to the Left knee completed on 07/25/2018.       Injury     On 09/13/2019 Patientn had a fall getting out of her  "bed "leg gave out" causing her to land on both knees. Then again on 10/07/2019 she had another fall gettin gout of the bed as well and laned on both knees. Son states she has a lot of falls due to weakness in the legs.          History of present illness:    This is a 79 y.o. year old female who complains of   10/23/2019    Past Medical History:   Diagnosis Date    Allergy     Arthritis     Carotid artery disease     Coronary artery disease     Diabetes mellitus     Diabetes mellitus, type 2     Fall at home     GERD (gastroesophageal reflux disease)     Headache(784.0)     Hyperlipidemia     Hypertension     Mental disorder     Obesity     Right rib fracture     Snoring     Toe fracture, right        Past Surgical History:   Procedure Laterality Date    CHOLECYSTECTOMY      CORONARY STENT PLACEMENT      x 5    CYST REMOVAL      sebaceous cyst from back    EYE SURGERY      OVARIAN CYST REMOVAL      SINUS SURGERY      TONSILLECTOMY         Current Outpatient Medications   Medication Sig    amLODIPine (NORVASC) 10 MG tablet Take 1 tablet (10 mg total) by mouth once daily.    atorvastatin (LIPITOR) 80 MG tablet TAKE 1 TABLET (80 MG TOTAL) BY MOUTH ONCE DAILY.    baclofen (LIORESAL) 10 MG tablet START WITH 1/2 TABLET EVERY EVENING X 1 WEEK THEN INCREASE TO 1 TABLET EVERY EVENING    calcium-vitamin D (CALCIUM 600 + D,3,) 600 mg(1,500mg) -400 unit Tab Take 1 tablet by mouth 2 (two) times daily.    clonazePAM (KLONOPIN) 0.5 MG tablet Take 1 tablet (0.5 mg total) by mouth 2 (two) times daily as needed for Anxiety.    clopidogrel (PLAVIX) 75 mg tablet TAKE 1 TABLET BY MOUTH EVERY DAY -MUST FOLLOW UP WITH CARDIOLOGIST    esomeprazole (NEXIUM) 20 MG capsule Take 20 mg by mouth once daily. Patient takes OTC nexium    ferrous sulfate 324 mg (65 mg iron) TbEC Take 325 mg by mouth 2 (two) times daily.    hydroCHLOROthiazide (HYDRODIURIL) 25 MG tablet Take 1 tablet (25 mg total) by mouth once daily. "    ibandronate (BONIVA) 150 mg tablet Take 1 tablet (150 mg total) by mouth every 30 days.    irbesartan (AVAPRO) 300 MG tablet TAKE 1 TABLET BY MOUTH EVERY DAY IN THE EVENING    levothyroxine (SYNTHROID) 50 MCG tablet TAKE 1 TABLET BY MOUTH EVERY DAY IN THE MORNING ON EMPTY STOMACH    magnesium oxide (MAG-OXIDE) 400 mg tablet Take 1 tablet (400 mg total) by mouth once daily. Every day    melatonin 3 mg Tab Take 3 mg by mouth nightly.    memantine (NAMENDA) 10 MG Tab Take 1 tablet (10 mg total) by mouth 2 (two) times daily.    metFORMIN (GLUCOPHAGE) 500 MG tablet TAKE 1 TABLET (500 MG TOTAL) BY MOUTH 2 (TWO) TIMES DAILY WITH MEALS.    MULTIVITAMIN ORAL Take 1 Package by mouth once daily. Pt takes Diabetic Vitamin Pack    nitroGLYCERIN (NITROSTAT) 0.4 MG SL tablet Place 1 tablet (0.4 mg total) under the tongue every 5 (five) minutes as needed for Chest pain. As directed PRN (Patient taking differently: Place 0.4 mg under the tongue every 5 (five) minutes as needed for Chest pain. As directed for chest pain.  Seek medical help if pain is not relieved by the third dose.)    potassium chloride (KLOR-CON SPRINKLE) 10 MEQ CpSR Take 1 capsule (10 mEq total) by mouth once daily.    ranitidine (ZANTAC) 150 MG tablet Take 150 mg by mouth 2 (two) times daily.    rivastigmine tartrate (EXELON) 3 MG capsule Take 1 capsule (3 mg total) by mouth 2 (two) times daily.    sertraline (ZOLOFT) 100 MG tablet Take 1 tablet (100 mg total) by mouth once daily.    traMADol (ULTRAM) 50 mg tablet Take 1 tablet (50 mg total) by mouth every 6 (six) hours as needed for Pain.    turmeric root extract 500 mg Cap Take 1 capsule by mouth 3 (three) times daily.     vitamin D 1000 units Tab Take 1,000 Units by mouth once daily.      No current facility-administered medications for this visit.        Review of patient's allergies indicates:   Allergen Reactions    Amoxicillin-pot clavulanate      Other reaction(s): CONSTIPATION     Hydrocodone-acetaminophen Other (See Comments)     Other reaction(s): irritable  Other reaction(s): aggressive    Meperidine Other (See Comments)     Other reaction(s): irritability  Other reaction(s): aggressiveness    Propoxyphene n-acetaminophen      Other reaction(s): irritable  Other reaction(s): aggressive    Sulfa (sulfonamide antibiotics)      Other reaction(s): Rash       Family History   Problem Relation Age of Onset    Heart disease Mother     Diabetes Mother     Cancer Mother         breast cancer    Heart disease Father     Heart disease Sister     Stroke Brother     Dementia Brother     Heart disease Brother        Social History     Socioeconomic History    Marital status:      Spouse name: Not on file    Number of children: Not on file    Years of education: Not on file    Highest education level: Not on file   Occupational History    Not on file   Social Needs    Financial resource strain: Not hard at all    Food insecurity:     Worry: Never true     Inability: Never true    Transportation needs:     Medical: No     Non-medical: No   Tobacco Use    Smoking status: Never Smoker    Smokeless tobacco: Never Used   Substance and Sexual Activity    Alcohol use: No     Frequency: Never     Drinks per session: Patient refused     Binge frequency: Never    Drug use: No    Sexual activity: Not Currently     Partners: Male   Lifestyle    Physical activity:     Days per week: 0 days     Minutes per session: 0 min    Stress: To some extent   Relationships    Social connections:     Talks on phone: More than three times a week     Gets together: Once a week     Attends [employer] service: Not on file     Active member of club or organization: No     Attends meetings of clubs or organizations: Never     Relationship status:    Other Topics Concern    Not on file   Social History Narrative    Not on file       Chief Complaint:   Chief Complaint   Patient presents with     "Right Knee - Pain     Osteoarthritis Right Knee. Had Euflexxa inj to the right knee completed on 07/25/2018 by Dr. Nair    Left Knee - Pain     Osteoarthritis Left Knee. Had Euflexxa inj to the Left knee completed on 07/25/2018.       Injury     On 09/13/2019 Patientn had a fall getting out of her bed "leg gave out" causing her to land on both knees. Then again on 10/07/2019 she had another fall gettin gout of the bed as well and laned on both knees. Son states she has a lot of falls due to weakness in the legs.          History of present illness:    This is a 79 y.o. year old female who complains of patient has a history of bilateral osteoarthritis of the knee she has had some falls 2 of them 1 September and another in October she complains of bilateral knee pain    Review of Systems:    Constitution: Denies chills, fever, and sweats.  HENT: Denies headaches or blurry vision.  Cardiovascular: Denies chest pain or irregular heart beat.  Respiratory: Denies cough or shortness of breath.  Gastrointestinal: Denies abdominal pain, nausea, or vomiting.  Musculoskeletal:  Denies muscle cramps.  Neurological: Denies dizziness or focal weakness.  Psychiatric/Behavioral: Normal mental status.  Hematologic/Lymphatic: Denies bleeding problem or easy bruising/bleeding.  Skin: Denies rash or suspicious lesions.    Examination:    Vital Signs:    Vitals:    10/23/19 1524   Weight: 86.2 kg (190 lb)   Height: 5' (1.524 m)       Body mass index is 37.11 kg/m².    This a well-developed, well nourished patient in no acute distress.    Alert and oriented x 3 and cooperative to examination.       Physical Exam:  Left knee-patient is no edema or warmth in her left knee today she moves it fairly well with no discomfort there is no gross deformity          Right knee-patient has no edema warmth in the right knee today she moves the knee fairly well and she has no pain    Imaging:  Previous x-rays of the right knee shows severe medial " compartmental arthritis x-ray of the left knee showed some mild degenerative changes       Assessment: Acute bilateral knee pain  -     Ambulatory referral/consult to Orthopedics        Plan:  Discussed the case with her son patient states that Tylenol is not helping her much will go ahead and give her some ultrasound at that she can take for pain and have her return as needed      DISCLAIMER: This note may have been dictated using voice recognition software and may contain grammatical errors.     NOTE: Consult report sent to referring provider via [employer] EMR.    Review of Systems:    Constitution: Denies chills, fever, and sweats.  HENT: Denies headaches or blurry vision.  Cardiovascular: Denies chest pain or irregular heart beat.  Respiratory: Denies cough or shortness of breath.  Gastrointestinal: Denies abdominal pain, nausea, or vomiting.  Musculoskeletal:  Denies muscle cramps.  Neurological: Denies dizziness or focal weakness.  Psychiatric/Behavioral: Normal mental status.  Hematologic/Lymphatic: Denies bleeding problem or easy bruising/bleeding.  Skin: Denies rash or suspicious lesions.    Examination:    Vital Signs:    Vitals:    10/23/19 1524   Weight: 86.2 kg (190 lb)   Height: 5' (1.524 m)       Body mass index is 37.11 kg/m².    This a well-developed, well nourished patient in no acute distress.    Alert and oriented x 3 and cooperative to examination.       Physical Exam:  See above    Imaging:  See above       Assessment: Acute bilateral knee pain  -     Ambulatory referral/consult to Orthopedics        Plan:  See above      DISCLAIMER: This note may have been dictated using voice recognition software and may contain grammatical errors.     NOTE: Consult report sent to referring provider via [employer] EMR.  Answers for HPI/ROS submitted by the patient on 10/23/2019   Leg pain  unexpected weight change: No  appetite change : No  sleep disturbance: No  IMMUNOCOMPROMISED: No  nervous/ anxious: No  dysphoric  mood: No  rash: No  visual disturbance: No  eye redness: No  eye pain: No  ear pain: No  tinnitus: No  hearing loss: No  sinus pressure : No  nosebleeds: No  enviro allergies: No  food allergies: No  cough: No  shortness of breath: No  sweating: No  dysuria: No  frequency: No  difficulty urinating: No  hematuria: No  painful intercourse: No  chest pain: No  palpitations: No  nausea: No  vomiting: No  diarrhea: No  blood in stool: No  constipation: No  headaches: No  dizziness: No  numbness: No  seizures: No  joint swelling: Yes  myalgia: Yes  weakness: No  back pain: No  Pain Chronicity: recurrent  History of trauma: No  Onset: 1 to 4 weeks ago  Frequency: daily  Progression since onset: unchanged  Injury mechanism: falling  injury location: at home  pain- numeric: 6/10  pain location: right knee  pain quality: sharp  Radiating Pain: No  If your pain is radiating, to what part of the body?: right knee  Aggravating factors: bearing weight  fever: No  inability to bear weight: No  itching: No  joint locking: Yes  limited range of motion: Yes  stiffness: Yes  tingling: No  Treatments tried: injection treatment  physical therapy: ineffective  Improvement on treatment: mild

## 2019-10-25 NOTE — TELEPHONE ENCOUNTER
"  SUBJECTIVE:   Jenny Moss is a 56 year old female who presents to clinic today for the following health issues:      ED/UC Followup:    Facility:  [facility]  Date of visit: 7/9/18  Reason for visit: throat pain, myalgia, GERD, esophagitis  Current Status: patient states that pain is worsening.  Most of pain is located between breasts and through to her back.  Also ear pain.  Throat is tight and voice is hoarse.  Feels like she can't get any air to come up.  Having a hard time breathing.       Jenny is here today for ED follow up. She was seen in the ED 6 weeks ago for abdominal pain and sore throat.  Today, she notes that her symptoms have not not improved.  She is having symptoms of a tight, sore throat. Pain is present in her epigastrium and radiates between her shoulder blades.  She denies any black or bloody stools, denies any hematemesis.  Denies any fatigue, dizziness, lightheadedness, syncope.    Problem list and histories reviewed & adjusted, as indicated.  Additional history: as documented    Reviewed and updated as needed this visit by clinical staff  Tobacco  Allergies  Meds  Med Hx  Surg Hx  Fam Hx  Soc Hx      Reviewed and updated as needed this visit by Provider         ROS:  Constitutional, HEENT, cardiovascular, pulmonary, gi and gu systems are negative, except as otherwise noted.    OBJECTIVE:     /88 (Cuff Size: Adult Regular)  Pulse 91  Temp 98.6  F (37  C) (Oral)  Ht 5' 3\" (1.6 m)  Wt 133 lb (60.3 kg)  SpO2 99%  BMI 23.56 kg/m2  Body mass index is 23.56 kg/(m^2).  GENERAL: healthy, alert and no distress  NECK: no adenopathy, no asymmetry, masses, or scars and thyroid normal to palpation  HENT: ear canals and TM's normal and nose and mouth without ulcers or lesions  RESP: lungs clear to auscultation - no rales, rhonchi or wheezes  CV: regular rate and rhythm, normal S1 S2, no S3 or S4, no murmur, click or rub, no peripheral edema and peripheral pulses strong  ABDOMEN: " LOV 9/25/19  FOV 12/11/19 Lipid 8/7/19  On call   soft, mild tenderness of epigstrium. No rebound or guarding, no masses palpated. Normal bowel sounds.  BACK: no CVA tenderness    Diagnostic Test Results:  none     ASSESSMENT/PLAN:       ICD-10-CM    1. Abdominal pain, epigastric R10.13 Hemoglobin     Comprehensive metabolic panel     Lipase     sucralfate (CARAFATE) 1 GM tablet     omeprazole (PRILOSEC) 20 MG CR capsule   2. Hoarseness of voice R49.0 TSH with free T4 reflex     Pain in upper abdomen, hoarse voice, burning sensation.   Exam and history consistent with suspected GERD vs PUD, vs gastritis, etc.  Above medication as directed with full discussion of risks, benefits, and possible adverse effects.  Would recommend EGD and maybe an US should symptoms persist.  Discussed warning s/s for which to seek emergent care.  F/u in 2 weeks.    Michelle Parr PA-C  [facility]

## 2019-10-26 RX ORDER — ATORVASTATIN CALCIUM 80 MG/1
80 TABLET, FILM COATED ORAL DAILY
Qty: 90 TABLET | Refills: 0 | Status: SHIPPED | OUTPATIENT
Start: 2019-10-26 | End: 2020-01-19

## 2019-10-27 RX ORDER — BACLOFEN 10 MG/1
TABLET ORAL
Qty: 90 TABLET | Refills: 0 | Status: SHIPPED | OUTPATIENT
Start: 2019-10-27 | End: 2019-10-30 | Stop reason: SDUPTHER

## 2019-10-28 ENCOUNTER — PATIENT MESSAGE (OUTPATIENT)
Dept: NEUROLOGY | Facility: CLINIC | Age: 79
End: 2019-10-28

## 2019-10-28 DIAGNOSIS — F03.90 DEMENTIA WITHOUT BEHAVIORAL DISTURBANCE, UNSPECIFIED DEMENTIA TYPE: ICD-10-CM

## 2019-10-28 RX ORDER — RIVASTIGMINE TARTRATE 3 MG/1
3 CAPSULE ORAL 2 TIMES DAILY
Qty: 180 CAPSULE | Refills: 0 | Status: SHIPPED | OUTPATIENT
Start: 2019-10-28 | End: 2020-01-08 | Stop reason: SDUPTHER

## 2019-10-28 NOTE — TELEPHONE ENCOUNTER
----- Message from Ashley Isaacs sent at 10/28/2019 10:06 AM CDT -----  Contact: pts son/aydee barajas lazara [phone]  Juliann Lazo-son is to set up an appt in [place] next Monday.  Pts med is due to be refilled.  Pts son states that she needs an appt at [place] location.  It would not come up to me.  And  Son says that she will run out of medication soon.  Sent a message through [facility] today.

## 2019-10-30 RX ORDER — BACLOFEN 10 MG/1
TABLET ORAL
Qty: 90 TABLET | Refills: 0 | Status: SHIPPED | OUTPATIENT
Start: 2019-10-30 | End: 2020-04-15

## 2019-11-05 RX ORDER — CLOPIDOGREL BISULFATE 75 MG/1
TABLET ORAL
Qty: 30 TABLET | Refills: 1 | Status: SHIPPED | OUTPATIENT
Start: 2019-11-05 | End: 2019-11-27 | Stop reason: SDUPTHER

## 2019-11-14 ENCOUNTER — HOSPITAL ENCOUNTER (EMERGENCY)
Facility: HOSPITAL | Age: 79
Discharge: HOME | End: 2019-11-14
Attending: EMERGENCY MEDICINE
Payer: MEDICARE

## 2019-11-14 ENCOUNTER — TELEPHONE (OUTPATIENT)
Dept: NEUROLOGY | Facility: CLINIC | Age: 79
End: 2019-11-14

## 2019-11-14 VITALS
WEIGHT: 190 LBS | BODY MASS INDEX: 37.11 KG/M2 | DIASTOLIC BLOOD PRESSURE: 64 MMHG | HEART RATE: 84 BPM | RESPIRATION RATE: 17 BRPM | SYSTOLIC BLOOD PRESSURE: 123 MMHG | TEMPERATURE: 99 F | OXYGEN SATURATION: 99 %

## 2019-11-14 DIAGNOSIS — M25.551 ACUTE RIGHT HIP PAIN: Primary | ICD-10-CM
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
## 2019-11-14 DIAGNOSIS — N39.0 ACUTE UTI: ICD-10-CM

## 2019-11-14 LAB
ALBUMIN SERPL BCP-MCNC: 4.7 G/DL (ref 3.5–5.2)
ALP SERPL-CCNC: 129 U/L (ref 55–135)
ALT SERPL W/O P-5'-P-CCNC: 23 U/L (ref 10–44)
ANION GAP SERPL CALC-SCNC: 13 MMOL/L (ref 8–16)
AST SERPL-CCNC: 20 U/L (ref 10–40)
BACTERIA #/AREA URNS HPF: ABNORMAL /HPF
BASOPHILS # BLD AUTO: 0.05 K/UL (ref 0–0.2)
BASOPHILS NFR BLD: 0.3 % (ref 0–1.9)
BILIRUB SERPL-MCNC: 0.7 MG/DL (ref 0.1–1)
BILIRUB UR QL STRIP: NEGATIVE
BUN SERPL-MCNC: 14 MG/DL (ref 8–23)
CALCIUM SERPL-MCNC: 10.1 MG/DL (ref 8.7–10.5)
CHLORIDE SERPL-SCNC: 103 MMOL/L (ref 95–110)
CLARITY UR: CLEAR
CO2 SERPL-SCNC: 26 MMOL/L (ref 23–29)
COLOR UR: YELLOW
CREAT SERPL-MCNC: 1 MG/DL (ref 0.5–1.4)
DIFFERENTIAL METHOD: ABNORMAL
EOSINOPHIL # BLD AUTO: 0 K/UL (ref 0–0.5)
EOSINOPHIL NFR BLD: 0.3 % (ref 0–8)
ERYTHROCYTE [DISTWIDTH] IN BLOOD BY AUTOMATED COUNT: 14.4 % (ref 11.5–14.5)
EST. GFR  (AFRICAN AMERICAN): >60 ML/MIN/1.73 M^2
EST. GFR  (NON AFRICAN AMERICAN): 54 ML/MIN/1.73 M^2
GLUCOSE SERPL-MCNC: 102 MG/DL (ref 70–110)
GLUCOSE UR QL STRIP: NEGATIVE
HCT VFR BLD AUTO: 39.2 % (ref 37–48.5)
HGB BLD-MCNC: 12.4 G/DL (ref 12–16)
HGB UR QL STRIP: ABNORMAL
IMM GRANULOCYTES # BLD AUTO: 0.08 K/UL (ref 0–0.04)
KETONES UR QL STRIP: NEGATIVE
LEUKOCYTE ESTERASE UR QL STRIP: ABNORMAL
LYMPHOCYTES # BLD AUTO: 1.5 K/UL (ref 1–4.8)
LYMPHOCYTES NFR BLD: 9.6 % (ref 18–48)
MCH RBC QN AUTO: 26.7 PG (ref 27–31)
MCHC RBC AUTO-ENTMCNC: 31.6 G/DL (ref 32–36)
MCV RBC AUTO: 85 FL (ref 82–98)
MICROSCOPIC COMMENT: ABNORMAL
MONOCYTES # BLD AUTO: 1.2 K/UL (ref 0.3–1)
MONOCYTES NFR BLD: 7.2 % (ref 4–15)
NEUTROPHILS # BLD AUTO: 13.1 K/UL (ref 1.8–7.7)
NEUTROPHILS NFR BLD: 82.1 % (ref 38–73)
NITRITE UR QL STRIP: NEGATIVE
NRBC BLD-RTO: 0 /100 WBC
PH UR STRIP: 8 [PH] (ref 5–8)
PLATELET # BLD AUTO: 374 K/UL (ref 150–350)
PMV BLD AUTO: 10.8 FL (ref 9.2–12.9)
POCT GLUCOSE: 91 MG/DL (ref 70–110)
POTASSIUM SERPL-SCNC: 3.5 MMOL/L (ref 3.5–5.1)
PROT SERPL-MCNC: 7.5 G/DL (ref 6–8.4)
PROT UR QL STRIP: NEGATIVE
RBC # BLD AUTO: 4.64 M/UL (ref 4–5.4)
RBC #/AREA URNS HPF: 2 /HPF (ref 0–4)
SODIUM SERPL-SCNC: 142 MMOL/L (ref 136–145)
SP GR UR STRIP: 1.01 (ref 1–1.03)
SQUAMOUS #/AREA URNS HPF: 5 /HPF
URN SPEC COLLECT METH UR: ABNORMAL
UROBILINOGEN UR STRIP-ACNC: NEGATIVE EU/DL
WBC # BLD AUTO: 15.91 K/UL (ref 3.9–12.7)
WBC #/AREA URNS HPF: 3 /HPF (ref 0–5)

## 2019-11-14 PROCEDURE — 36415 COLL VENOUS BLD VENIPUNCTURE: CPT

## 2019-11-14 PROCEDURE — 82962 GLUCOSE BLOOD TEST: CPT

## 2019-11-14 PROCEDURE — 85025 COMPLETE CBC W/AUTO DIFF WBC: CPT

## 2019-11-14 PROCEDURE — 25000003 PHARM REV CODE 250: Performed by: EMERGENCY MEDICINE

## 2019-11-14 PROCEDURE — 81000 URINALYSIS NONAUTO W/SCOPE: CPT

## 2019-11-14 PROCEDURE — 80053 COMPREHEN METABOLIC PANEL: CPT

## 2019-11-14 PROCEDURE — 99285 EMERGENCY DEPT VISIT HI MDM: CPT | Mod: 25

## 2019-11-14 RX ORDER — CEPHALEXIN 500 MG/1
500 CAPSULE ORAL EVERY 8 HOURS
Qty: 18 CAPSULE | Refills: 0 | Status: SHIPPED | OUTPATIENT
Start: 2019-11-14 | End: 2019-11-20

## 2019-11-14 RX ORDER — CEPHALEXIN 250 MG/1
500 CAPSULE ORAL
Status: COMPLETED | OUTPATIENT
Start: 2019-11-14 | End: 2019-11-14

## 2019-11-14 RX ADMIN — CEPHALEXIN 500 MG: 250 CAPSULE ORAL at 11:11

## 2019-11-14 NOTE — TELEPHONE ENCOUNTER
----- Message from Dominga Cheung sent at 11/14/2019 12:05 PM CST -----  Contact: Eric Cherry (son) @ [phone]  Calling to reschedule pts appt on 11-18-19 preferably [place] but will come to [place].  Pls call.

## 2019-11-14 NOTE — TELEPHONE ENCOUNTER
Spoke with pt son Matthew, he states he needs to reschedule pt appt for another date preferably a Wednesday. Matthew was informed [facility] does not work on Wednesday. Matthew states he will contact the office back after trying to work something out with job. He was advised [facility] is at main on Tuesday and Thursday, clinic on another floor on Wednesday. Matthew voiced understanding

## 2019-11-15 NOTE — ED NOTES
Pt given sandwich and cranberry juice, eating without difficulty. No other requests or needs at this time. Son at the bedside.

## 2019-11-15 NOTE — ED PROVIDER NOTES
Encounter Date: 11/14/2019    SCRIBE #1 NOTE: Vesta VAYS and tutu scribing for, and in the presence of, Oliverio Mae MD.       History     Chief Complaint   Patient presents with    Hip Pain     right hip pain after fall at home      Time seen by provider: 7:04 PM on 11/14/2019    Jerica Rios is a 79 y.o. female with PMHx of arthritis, Type 2 DM, and HTN who presents to the ED with an onset of right hip pain s/p a mechanical fall. The patient has bad arthritis in her right knee, and she reports that her knee locked up on her, and she fell onto her right side. She denies hitting her head or losing consciousness. Her son found her on the floor, and she has not been able to walk since the fall. Her son also notes that she has not eaten or taken her medications for diabetes today. She reports a headache, which her son reports is typical for her when she has not eaten recently. The patient takes tramadol for pain regularly, but she has not had any today. The patient denies painful urination, increased frequency of urination, abdominal pain, neck pain, or any other symptoms at this time. Patient's PSHx includes coronary stent placement. Drug allergies to Amoxicillin-pot Clavulanate, Hydrocodone-acetaminophen, Meperidine, Propoxyphene N-acetaminophen, and Sulfa.     The history is provided by the patient and a relative.     Review of patient's allergies indicates:   Allergen Reactions    Amoxicillin-pot clavulanate      Other reaction(s): CONSTIPATION    Hydrocodone-acetaminophen Other (See Comments)     Other reaction(s): irritable  Other reaction(s): aggressive    Meperidine Other (See Comments)     Other reaction(s): irritability  Other reaction(s): aggressiveness    Propoxyphene n-acetaminophen      Other reaction(s): irritable  Other reaction(s): aggressive    Sulfa (sulfonamide antibiotics)      Other reaction(s): Rash     Past Medical History:   Diagnosis Date    Allergy     Arthritis      Carotid artery disease     Coronary artery disease     Diabetes mellitus     Diabetes mellitus, type 2     Fall at home     GERD (gastroesophageal reflux disease)     Headache(784.0)     Hyperlipidemia     Hypertension     Mental disorder     Obesity     Right rib fracture     Snoring     Toe fracture, right      Past Surgical History:   Procedure Laterality Date    CHOLECYSTECTOMY      CORONARY STENT PLACEMENT      x 5    CYST REMOVAL      sebaceous cyst from back    EYE SURGERY      OVARIAN CYST REMOVAL      SINUS SURGERY      TONSILLECTOMY       Family History   Problem Relation Age of Onset    Heart disease Mother     Diabetes Mother     Cancer Mother         breast cancer    Heart disease Father     Heart disease Sister     Stroke Brother     Dementia Brother     Heart disease Brother      Social History     Tobacco Use    Smoking status: Never Smoker    Smokeless tobacco: Never Used   Substance Use Topics    Alcohol use: No     Frequency: Never     Drinks per session: Patient refused     Binge frequency: Never    Drug use: No     Review of Systems   Constitutional: Negative for fever.   HENT: Negative for congestion.    Eyes: Negative for visual disturbance.   Respiratory: Negative for wheezing.    Cardiovascular: Negative for chest pain.   Gastrointestinal: Negative for abdominal pain.   Genitourinary: Negative for dysuria and frequency.   Musculoskeletal: Positive for arthralgias (right hip). Negative for joint swelling and neck pain.   Skin: Negative for rash.   Neurological: Positive for headaches. Negative for syncope.   Hematological: Does not bruise/bleed easily.   Psychiatric/Behavioral: Negative for confusion.       Physical Exam     Initial Vitals [11/14/19 1854]   BP Pulse Resp Temp SpO2   (!) 182/73 90 17 99.1 °F (37.3 °C) 99 %      MAP       --         Physical Exam    Nursing note and vitals reviewed.  Constitutional: She appears well-nourished.   HENT:    Head: Normocephalic and atraumatic.   Eyes: Conjunctivae and EOM are normal.   Neck: Normal range of motion. Neck supple. No thyroid mass present.   Cardiovascular: Normal rate, regular rhythm and normal heart sounds. Exam reveals no gallop and no friction rub.    No murmur heard.  Pulmonary/Chest: Breath sounds normal. She has no wheezes. She has no rhonchi. She has no rales.   Abdominal: Soft. Normal appearance and bowel sounds are normal. There is no tenderness.   Musculoskeletal:   Able to straight leg raise against resistance bilaterally. No gross deformity or swelling to any area of the right leg.    Neurological: She is alert and oriented to person, place, and time. She has normal strength. No cranial nerve deficit or sensory deficit.   Skin: Skin is warm and dry. No rash noted. No erythema.   Psychiatric: She has a normal mood and affect. Her speech is normal. Cognition and memory are normal.         ED Course   Procedures  Labs Reviewed   CBC W/ AUTO DIFFERENTIAL - Abnormal; Notable for the following components:       Result Value    WBC 15.91 (*)     Mean Corpuscular Hemoglobin 26.7 (*)     Mean Corpuscular Hemoglobin Conc 31.6 (*)     Platelets 374 (*)     Gran # (ANC) 13.1 (*)     Immature Grans (Abs) 0.08 (*)     Mono # 1.2 (*)     Gran% 82.1 (*)     Lymph% 9.6 (*)     All other components within normal limits   COMPREHENSIVE METABOLIC PANEL - Abnormal; Notable for the following components:    eGFR if non  54 (*)     All other components within normal limits   URINALYSIS, REFLEX TO URINE CULTURE - Abnormal; Notable for the following components:    Occult Blood UA Trace (*)     Leukocytes, UA 3+ (*)     All other components within normal limits    Narrative:     Preferred Collection Type->Urine, Clean Catch   URINALYSIS MICROSCOPIC   POCT GLUCOSE   POCT GLUCOSE MONITORING CONTINUOUS          Imaging Results          CT Hip Without Contrast Right (Final result)  Result time 11/14/19  21:53:31    Final result by Erik Garcia MD (11/14/19 21:53:31)                 Impression:      No evidence of a displaced fracture of the right hip, allowing for diffuse osteopenia.  If symptoms persist, MRI of the right hip may be obtained for further evaluation.      Electronically signed by: Erik Garcia MD  Date:    11/14/2019  Time:    21:53             Narrative:    EXAMINATION:  CT HIP WITHOUT CONTRAST RIGHT    CLINICAL HISTORY:  Fracture, hip;    TECHNIQUE:  Axial CT scan of the right hip was performed without intravenous contrast.  Coronal and sagittal reformats were obtained.    COMPARISON:  X-ray of the right hip dated 11/14/2019.    FINDINGS:  There is diffuse demineralization of the osseous structures.  No cortical step-off is identified.  The inferior and superior pubic rami are within normal limits.  The acetabulum is intact.  The femoral head and neck appears intact, allowing for osteopenia.    There is expected joint space narrowing involving the right hip joint.  There is also joint space narrowing involving the pubic symphysis.    Vascular calcifications are present.  The soft tissues are otherwise unremarkable.                               X-Ray Hip 2 View Right (Final result)  Result time 11/14/19 20:03:36    Final result by Erik Garcia MD (11/14/19 20:03:36)                 Impression:      Apparent foreshortening of the right femoral neck.  The findings may represent impacted subcapital femoral neck fracture.  Noncontrast MRI of the right hip may be obtained for further evaluation.      Electronically signed by: Erik Garcia MD  Date:    11/14/2019  Time:    20:03             Narrative:    EXAMINATION:  XR HIP 2 VIEW RIGHT    CLINICAL HISTORY:  Fall on same level from slipping, tripping and stumbling without subsequent striking against object, initial encounter    TECHNIQUE:  AP view of the pelvis and frog leg lateral view of the right hip were  performed.    COMPARISON:  02/07/2016.    FINDINGS:  Pelvis:    There is demineralization of the osseous structures.  No cortical step-off is identified.  There is no evidence of periostitis.  The pelvic ring is intact.  There is expected joint space narrowing in the pelvis.  There are phleboliths in the pelvis.  There is large colonic stool burden.  There is no evidence of fracture or malalignment involving the pelvic bones.    Right hip:    There is demineralization of the osseous structures.  There is apparent foreshortening of the right femoral neck.  The remainder of the osseous structures are within normal limits.  There is joint space narrowing involving the right hip joint.  There is no evidence of a dislocation.                                 Medical Decision Making:   History:   Old Medical Records: I decided to obtain old medical records.  Clinical Tests:   Lab Tests: Ordered and Reviewed  Radiological Study: Ordered and Reviewed  ED Management:  This patient was emergently received and assessed upon arrival.  She is able to straight leg raise bilaterally without pain.  There is no shortening of the extremities. The patient has had a stable neurologic course prior to arrival after the fall.  She indicates no head pain, loss of consciousness or head contusion.  Low suspicion for intracranial bleed.  Initial x-ray of the hip indicates a question of shortening at the joint.  Later CT scan reveals no overt fracture/dislocation.  Labs indicate a leukocytosis of 15,000 without additional vital sign qualifiers for sepsis.  This may be associated with pain after her fall.  Urinalysis does indicate some findings consistent with UTI and she will be treated for this.  The patient was able to walk around the ER with baseline mobility using a walker.  The patient's son is also here and he is educated about supportive care for this.  They are asked to have her follow up with the primary care doctor as soon as possible  and to return to the ER for any new, concerning, or worsening symptoms.  Patient's son is agreeable with this plan for close follow-up and she is discharged in stable condition.            Scribe Attestation:   Scribe #1: I performed the above scribed service and the documentation accurately describes the services I performed. I attest to the accuracy of the note.    I, Dr. Oliverio Mae, personally performed the services described in this documentation. All medical record entries made by the scribe were at my direction and in my presence.  I have reviewed the chart and agree that the record reflects my personal performance and is accurate and complete. Oliverio Mae MD.  6:16 AM 11/15/2019                        Clinical Impression:       ICD-10-CM ICD-9-CM   1. Acute right hip pain M25.551 719.45   2. Fall on same level from stumbling, initial encounter W01.0XXA E885.9   3. Acute UTI N39.0 599.0         Disposition:   Disposition: Discharged  Condition: Stable                     Oliverio Mae MD  11/15/19 0616

## 2019-11-18 ENCOUNTER — OFFICE VISIT (OUTPATIENT)
Dept: NEUROLOGY | Facility: CLINIC | Age: 79
End: 2019-11-18
Payer: MEDICARE

## 2019-11-18 VITALS
BODY MASS INDEX: 39.05 KG/M2 | SYSTOLIC BLOOD PRESSURE: 124 MMHG | DIASTOLIC BLOOD PRESSURE: 65 MMHG | HEART RATE: 72 BPM | RESPIRATION RATE: 18 BRPM | HEIGHT: 60 IN | WEIGHT: 198.88 LBS

## 2019-11-18 DIAGNOSIS — N39.0 URINARY TRACT INFECTION WITHOUT HEMATURIA, SITE UNSPECIFIED: Primary | ICD-10-CM

## 2019-11-18 DIAGNOSIS — E11.9 CONTROLLED TYPE 2 DIABETES MELLITUS WITHOUT COMPLICATION, WITHOUT LONG-TERM CURRENT USE OF INSULIN: ICD-10-CM

## 2019-11-18 DIAGNOSIS — F03.90 DEMENTIA WITHOUT BEHAVIORAL DISTURBANCE, UNSPECIFIED DEMENTIA TYPE: ICD-10-CM

## 2019-11-18 DIAGNOSIS — I15.2 HYPERTENSION ASSOCIATED WITH DIABETES: ICD-10-CM

## 2019-11-18 DIAGNOSIS — E11.59 HYPERTENSION ASSOCIATED WITH DIABETES: ICD-10-CM

## 2019-11-18 DIAGNOSIS — E78.5 HYPERLIPIDEMIA WITH TARGET LDL LESS THAN 70: ICD-10-CM

## 2019-11-18 PROCEDURE — 3074F PR MOST RECENT SYSTOLIC BLOOD PRESSURE < 130 MM HG: ICD-10-PCS | Mod: CPTII,S$GLB,, | Performed by: NURSE PRACTITIONER

## 2019-11-18 PROCEDURE — 99999 PR PBB SHADOW E&M-EST. PATIENT-LVL III: ICD-10-PCS | Mod: PBBFAC,,, | Performed by: NURSE PRACTITIONER

## 2019-11-18 PROCEDURE — 3078F DIAST BP <80 MM HG: CPT | Mod: CPTII,S$GLB,, | Performed by: NURSE PRACTITIONER

## 2019-11-18 PROCEDURE — 99214 OFFICE O/P EST MOD 30 MIN: CPT | Mod: S$GLB,,, | Performed by: NURSE PRACTITIONER

## 2019-11-18 PROCEDURE — 99999 PR PBB SHADOW E&M-EST. PATIENT-LVL III: CPT | Mod: PBBFAC,,, | Performed by: NURSE PRACTITIONER

## 2019-11-18 PROCEDURE — 3074F SYST BP LT 130 MM HG: CPT | Mod: CPTII,S$GLB,, | Performed by: NURSE PRACTITIONER

## 2019-11-18 PROCEDURE — 99499 RISK ADDL DX/OHS AUDIT: ICD-10-PCS | Mod: S$GLB,,, | Performed by: NURSE PRACTITIONER

## 2019-11-18 PROCEDURE — 99214 PR OFFICE/OUTPT VISIT, EST, LEVL IV, 30-39 MIN: ICD-10-PCS | Mod: S$GLB,,, | Performed by: NURSE PRACTITIONER

## 2019-11-18 PROCEDURE — 1101F PT FALLS ASSESS-DOCD LE1/YR: CPT | Mod: CPTII,S$GLB,, | Performed by: NURSE PRACTITIONER

## 2019-11-18 PROCEDURE — 1101F PR PT FALLS ASSESS DOC 0-1 FALLS W/OUT INJ PAST YR: ICD-10-PCS | Mod: CPTII,S$GLB,, | Performed by: NURSE PRACTITIONER

## 2019-11-18 PROCEDURE — 99499 UNLISTED E&M SERVICE: CPT | Mod: S$GLB,,, | Performed by: NURSE PRACTITIONER

## 2019-11-18 PROCEDURE — 3078F PR MOST RECENT DIASTOLIC BLOOD PRESSURE < 80 MM HG: ICD-10-PCS | Mod: CPTII,S$GLB,, | Performed by: NURSE PRACTITIONER

## 2019-11-18 RX ORDER — MEMANTINE HYDROCHLORIDE 10 MG/1
10 TABLET ORAL 2 TIMES DAILY
Qty: 180 TABLET | Refills: 3 | Status: SHIPPED | OUTPATIENT
Start: 2019-11-18 | End: 2020-03-04 | Stop reason: SDUPTHER

## 2019-11-18 NOTE — PROGRESS NOTES
"Name: Jerica Rios  MRN: [id number]   [facility]: [id number]      Date: 11-18-19      Referring physician:  No referring provider defined for this encounter.    Subjective:      Chief Complaint: memory     History of Present Illness (HPI):    Jerica Rios is a 79 y.o. right-handed female with DM 2, DEVONTE, CAD, HLD, HTN who presents today for a follow-up evaluation of Dementia and is accompanied by son. Last seen in this office 7-23-18.     She feels her memory is not so good, not perfect. She does not feel it is getting worse. He says it is progressing gradually.     Son feels there are increments of time- some days "brilliance" and times of forgetfulness.     Fell last week. Went to ED. No broken bones. Discovered UTI. Now on antibiotic.     Taking rivastigmine 3 mg BID.     Son lives with her. Still independent with feeding, dressing, bathing.   Does need reminders to bathe at times.   He does all the cooking. She no longer uses microwave. She was doing this last visit.   He calls her and checks on her throughout day while he is at work. He sets up snacks and sandwiches for her so she does not have to use microwave.   He manges the medicines- sets them up and he reminds her to take. He just feels more confident with this.   He makes her doctor appointments.       Does have Rolator at home.     Mood pretty even. She denies depression or sadness. She has had this in the past, opal when her  passed 9 years ago.     Does have Life Line.       Review of Systems   Constitutional: Negative for appetite change.   HENT: Negative for trouble swallowing.    Genitourinary:        Currently being treated for UTI   Musculoskeletal: Positive for gait problem.   Psychiatric/Behavioral: Negative for dysphoric mood and sleep disturbance.       Past Medical History: The patient  has a past medical history of Allergy, Arthritis, Carotid artery disease, Coronary artery disease, Diabetes mellitus, Diabetes mellitus, type 2, " Fall at home, GERD (gastroesophageal reflux disease), Headache(784.0), Hyperlipidemia, Hypertension, Mental disorder, Obesity, Right rib fracture, Snoring, and Toe fracture, right.    Social History: The patient  reports that she has never smoked. She has never used smokeless tobacco. She reports that she does not drink alcohol or use drugs.    Family History: Their family history includes Cancer in her mother; Dementia in her brother; Diabetes in her mother; Heart disease in her brother, father, mother, and sister; Stroke in her brother.    Allergies: Amoxicillin-pot clavulanate; Hydrocodone-acetaminophen; Meperidine; Propoxyphene n-acetaminophen; and Sulfa (sulfonamide antibiotics)     Meds:   Current Outpatient Medications on File Prior to Visit   Medication Sig Dispense Refill    amLODIPine (NORVASC) 10 MG tablet Take 1 tablet (10 mg total) by mouth once daily. 90 tablet 0    atorvastatin (LIPITOR) 80 MG tablet Take 1 tablet (80 mg total) by mouth once daily. 90 tablet 0    baclofen (LIORESAL) 10 MG tablet START WITH 1/2 TABLET EVERY EVENING X 1 WEEK THEN INCREASE TO 1 TABLET EVERY EVENING (Patient taking differently: Take 10 mg by mouth once daily. ) 90 tablet 0    calcium-vitamin D (CALCIUM 600 + D,3,) 600 mg(1,500mg) -400 unit Tab Take 1 tablet by mouth 2 (two) times daily.      cephALEXin (KEFLEX) 500 MG capsule Take 1 capsule (500 mg total) by mouth every 8 (eight) hours. for 6 days 18 capsule 0    clopidogrel (PLAVIX) 75 mg tablet TAKE 1 TABLET BY MOUTH EVERY DAY -MUST FOLLOW UP WITH CARDIOLOGIST (Patient taking differently: Take 75 mg by mouth once daily. TAKE 1 TABLET BY MOUTH EVERY DAY -MUST FOLLOW UP WITH CARDIOLOGIST) 30 tablet 1    esomeprazole (NEXIUM) 20 MG capsule Take 20 mg by mouth once daily. Patient takes OTC nexium      ferrous sulfate 324 mg (65 mg iron) TbEC Take 325 mg by mouth 2 (two) times daily.      hydroCHLOROthiazide (HYDRODIURIL) 25 MG tablet Take 1 tablet (25 mg total) by  mouth once daily. 90 tablet 3    ibandronate (BONIVA) 150 mg tablet Take 1 tablet (150 mg total) by mouth every 30 days. 1 tablet 11    irbesartan (AVAPRO) 300 MG tablet TAKE 1 TABLET BY MOUTH EVERY DAY IN THE EVENING (Patient taking differently: Take 300 mg by mouth every evening. ) 90 tablet 2    levothyroxine (SYNTHROID) 50 MCG tablet TAKE 1 TABLET BY MOUTH EVERY DAY IN THE MORNING ON EMPTY STOMACH (Patient taking differently: Take 50 mcg by mouth before breakfast. ) 90 tablet 3    magnesium oxide (MAG-OXIDE) 400 mg tablet Take 1 tablet (400 mg total) by mouth once daily. Every day 90 tablet 3    melatonin 3 mg Tab Take 3 mg by mouth nightly.      metFORMIN (GLUCOPHAGE) 500 MG tablet TAKE 1 TABLET (500 MG TOTAL) BY MOUTH 2 (TWO) TIMES DAILY WITH MEALS. (Patient taking differently: Take 500 mg by mouth 2 (two) times daily with meals. TAKE 1 TABLET (500 MG TOTAL) BY MOUTH 2 (TWO) TIMES DAILY WITH MEALS.) 180 tablet 3    MULTIVITAMIN ORAL Take 1 Package by mouth once daily. Pt takes Diabetic Vitamin Pack      nitroGLYCERIN (NITROSTAT) 0.4 MG SL tablet Place 1 tablet (0.4 mg total) under the tongue every 5 (five) minutes as needed for Chest pain. As directed PRN (Patient taking differently: Place 0.4 mg under the tongue every 5 (five) minutes as needed for Chest pain. As directed for chest pain.  Seek medical help if pain is not relieved by the third dose.) 100 tablet 3    potassium chloride (KLOR-CON SPRINKLE) 10 MEQ CpSR Take 1 capsule (10 mEq total) by mouth once daily. 90 capsule 3    ranitidine (ZANTAC) 150 MG tablet Take 150 mg by mouth 2 (two) times daily.      rivastigmine tartrate (EXELON) 3 MG capsule Take 1 capsule (3 mg total) by mouth 2 (two) times daily. 180 capsule 0    sertraline (ZOLOFT) 100 MG tablet Take 1 tablet (100 mg total) by mouth once daily. 90 tablet 3    tramadol-acetaminophen 37.5-325 mg (ULTRACET) 37.5-325 mg Tab Take 1 tablet by mouth every 6 (six) hours as needed for Pain.  30 tablet 0    turmeric root extract 500 mg Cap Take 1 capsule by mouth 3 (three) times daily.       vitamin D 1000 units Tab Take 1,000 Units by mouth once daily.       [DISCONTINUED] memantine (NAMENDA) 10 MG Tab Take 1 tablet (10 mg total) by mouth 2 (two) times daily. 60 tablet 11     No current facility-administered medications on file prior to visit.        Objective:     Physical Exam:    Vitals:    19 1413   BP: 124/65   Pulse: 72   Resp: 18   Weight: 90.2 kg (198 lb 13.7 oz)   Height: 5' (1.524 m)     Body mass index is 38.84 kg/m².    Constitutional  Well-developed, well-nourished, appears stated age     ..  Neurological    * Mental status  MOCA = deferred today (currently being treated for UTI)   - Orientation Oriented to: person, place, age, phone number,  and that she lives in [place] and next holiday is Thanksgiving.   Not oriented to address, year, month, President     - Memory     Impaired- content to let son answer for her     - Attention/concentration  Decreased     - Language  when does speak, tends to answer in 2-4 words     - Fund of knowledge  not tested     Awake, alert, pleasant and cooperative.   RR equal and unlabored.   Face symmetric.   EOMI.  Hearing intact to conversation.   WC today.       Laboratory Results:  Admission on 2019, Discharged on 2019   Component Date Value Ref Range Status    WBC 2019 15.91* 3.90 - 12.70 K/uL Final    RBC 2019 4.64  4.00 - 5.40 M/uL Final    Hemoglobin 2019 12.4  12.0 - 16.0 g/dL Final    Hematocrit 2019 39.2  37.0 - 48.5 % Final    Mean Corpuscular Volume 2019 85  82 - 98 fL Final    Mean Corpuscular Hemoglobin 2019 26.7* 27.0 - 31.0 pg Final    Mean Corpuscular Hemoglobin Conc 2019 31.6* 32.0 - 36.0 g/dL Final    RDW 2019 14.4  11.5 - 14.5 % Final    Platelets 2019 374* 150 - 350 K/uL Final    MPV 2019 10.8  9.2 - 12.9 fL Final    Gran # (ANC) 2019 13.1*  1.8 - 7.7 K/uL Final    Immature Grans (Abs) 11/14/2019 0.08* 0.00 - 0.04 K/uL Final    Lymph # 11/14/2019 1.5  1.0 - 4.8 K/uL Final    Mono # 11/14/2019 1.2* 0.3 - 1.0 K/uL Final    Eos # 11/14/2019 0.0  0.0 - 0.5 K/uL Final    Baso # 11/14/2019 0.05  0.00 - 0.20 K/uL Final    nRBC 11/14/2019 0  0 /100 WBC Final    Gran% 11/14/2019 82.1* 38.0 - 73.0 % Final    Lymph% 11/14/2019 9.6* 18.0 - 48.0 % Final    Mono% 11/14/2019 7.2  4.0 - 15.0 % Final    Eosinophil% 11/14/2019 0.3  0.0 - 8.0 % Final    Basophil% 11/14/2019 0.3  0.0 - 1.9 % Final    Differential Method 11/14/2019 Automated   Final    Sodium 11/14/2019 142  136 - 145 mmol/L Final    Potassium 11/14/2019 3.5  3.5 - 5.1 mmol/L Final    Chloride 11/14/2019 103  95 - 110 mmol/L Final    CO2 11/14/2019 26  23 - 29 mmol/L Final    Glucose 11/14/2019 102  70 - 110 mg/dL Final    BUN, Bld 11/14/2019 14  8 - 23 mg/dL Final    Creatinine 11/14/2019 1.0  0.5 - 1.4 mg/dL Final    Calcium 11/14/2019 10.1  8.7 - 10.5 mg/dL Final    Total Protein 11/14/2019 7.5  6.0 - 8.4 g/dL Final    Albumin 11/14/2019 4.7  3.5 - 5.2 g/dL Final    Total Bilirubin 11/14/2019 0.7  0.1 - 1.0 mg/dL Final    Alkaline Phosphatase 11/14/2019 129  55 - 135 U/L Final    AST 11/14/2019 20  10 - 40 U/L Final    ALT 11/14/2019 23  10 - 44 U/L Final    Anion Gap 11/14/2019 13  8 - 16 mmol/L Final    eGFR if African American 11/14/2019 >60  >60 mL/min/1.73 m^2 Final    eGFR if non African American 11/14/2019 54* >60 mL/min/1.73 m^2 Final    POCT Glucose 11/14/2019 91  70 - 110 mg/dL Final    Specimen UA 11/14/2019 Urine, Clean Catch   Final    Color, UA 11/14/2019 Yellow  Yellow, Straw, Gayle Final    Appearance, UA 11/14/2019 Clear  Clear Final    pH, UA 11/14/2019 8.0  5.0 - 8.0 Final    Specific [place], UA 11/14/2019 1.010  1.005 - 1.030 Final    Protein, UA 11/14/2019 Negative  Negative Final    Glucose, UA 11/14/2019 Negative  Negative Final    Ketones, UA  11/14/2019 Negative  Negative Final    Bilirubin (UA) 11/14/2019 Negative  Negative Final    Occult Blood UA 11/14/2019 Trace* Negative Final    Nitrite, UA 11/14/2019 Negative  Negative Final    Urobilinogen, UA 11/14/2019 Negative  <2.0 EU/dL Final    Leukocytes, UA 11/14/2019 3+* Negative Final    RBC, UA 11/14/2019 2  0 - 4 /hpf Final    WBC, UA 11/14/2019 3  0 - 5 /hpf Final    Bacteria 11/14/2019 Moderate* None-Occ /hpf Final    Squam Epithel, UA 11/14/2019 5  /hpf Final    Microscopic Comment 11/14/2019 SEE COMMENT   Final   Lab Visit on 09/25/2019   Component Date Value Ref Range Status    Ferritin 09/25/2019 115  20.0 - 300.0 ng/mL Final    Iron 09/25/2019 71  30 - 160 ug/dL Final    Transferrin 09/25/2019 219  200 - 375 mg/dL Final    TIBC 09/25/2019 324  250 - 450 ug/dL Final    Saturated Iron 09/25/2019 22  20 - 50 % Final    WBC 09/25/2019 11.28  3.90 - 12.70 K/uL Final    RBC 09/25/2019 3.88* 4.00 - 5.40 M/uL Final    Hemoglobin 09/25/2019 12.5  12.0 - 16.0 g/dL Final    Hematocrit 09/25/2019 35.8* 37.0 - 48.5 % Final    Mean Corpuscular Volume 09/25/2019 92  82 - 98 fL Final    Mean Corpuscular Hemoglobin 09/25/2019 32.2* 27.0 - 31.0 pg Final    Mean Corpuscular Hemoglobin Conc 09/25/2019 34.9  32.0 - 36.0 g/dL Final    RDW 09/25/2019 15.9* 11.5 - 14.5 % Final    Platelets 09/25/2019 384* 150 - 350 K/uL Final    MPV 09/25/2019 11.3  9.2 - 12.9 fL Final    Immature Granulocytes 09/25/2019 0.3  0.0 - 0.5 % Final    Gran # (ANC) 09/25/2019 7.2  1.8 - 7.7 K/uL Final    Immature Grans (Abs) 09/25/2019 0.03  0.00 - 0.04 K/uL Final    Lymph # 09/25/2019 2.8  1.0 - 4.8 K/uL Final    Mono # 09/25/2019 1.0  0.3 - 1.0 K/uL Final    Eos # 09/25/2019 0.2  0.0 - 0.5 K/uL Final    Baso # 09/25/2019 0.06  0.00 - 0.20 K/uL Final    nRBC 09/25/2019 0  0 /100 WBC Final    Gran% 09/25/2019 64.1  38.0 - 73.0 % Final    Lymph% 09/25/2019 24.5  18.0 - 48.0 % Final    Mono% 09/25/2019 8.6   4.0 - 15.0 % Final    Eosinophil% 09/25/2019 2.0  0.0 - 8.0 % Final    Basophil% 09/25/2019 0.5  0.0 - 1.9 % Final    Differential Method 09/25/2019 Automated   Final   Office Visit on 09/18/2019   Component Date Value Ref Range Status    Urine Culture, Routine 09/19/2019 No growth   Final    Color, UA 09/18/2019 yellow   Final    Spec Grav UA 09/18/2019 1,005   Final    pH, UA 09/18/2019 8   Final    WBC, UA 09/18/2019 +   Final    Nitrite, UA 09/18/2019 neg   Final    Protein 09/18/2019 30   Final    Glucose, UA 09/18/2019 norm   Final    Ketones, UA 09/18/2019 neg   Final    Urobilinogen, UA 09/18/2019 norm   Final    Bilirubin 09/18/2019 neg   Final    Blood, UA 09/18/2019 neg   Final         Imaging:   reviewed from CT head 2-2016            ________________________________________________  CT head 2017:             Impression        1.  No acute intracranial abnormality is seen.  2. Stable mild chronic cerebral atrophy and supratentorial nonspecific white matter disease.  3. Small volume of right sphenoid sinus fluid. Consider mild sinusitis.      In agreement with the preliminary [facility] report.             Electronically signed by: Cecilia Carcamo MD  Date: 06/22/17           Assessment and Plan     Urinary tract infection without hematuria, site unspecified    Dementia without behavioral disturbance, unspecified dementia type  -     memantine (NAMENDA) 10 MG Tab; Take 1 tablet (10 mg total) by mouth 2 (two) times daily.  Dispense: 180 tablet; Refill: 3    Controlled type 2 diabetes mellitus without complication, without long-term current use of insulin    Hypertension associated with diabetes    Hyperlipidemia with target LDL less than 70, baseline LDL not available        Medical Decision Making:    Son interested in increasing rivastigmine to see if this helps. Currently taking 3 mg BID. Currently taking antibiotic for UTI.   Recommend that we not increase this until she is off antibiotic for  1-2 weeks.   Message me and I am happy to increase this.  Continue memantine 10 mg BID.     Call for problems.    Follow up 3-4 months in [place]. Will plan on MoCA at that time.     I spent 25 minutes face-to-face with the patient with >50% of the time spent with counseling and education regarding:  - results of data, diagnosis, and recommendations stated above  - the prognosis of dementia  -UTI on memory and balane  - risks and benefits of rivastigmine and memantine  - importance of diet and exercise    Lesly Humphreys, DNP, NP-C  Division of Movement and Memory Disorders  [facility]  [phone]

## 2019-11-18 NOTE — PATIENT INSTRUCTIONS
Continue memantine 10 mg twice daily.     Continue rivastigmine 3 mg twice daily. When you have been off antibiotic for 2 weeks, I can increase this to 4.5 mg twice daily if you'd like.     Follow up 3-4 months in [place].

## 2019-11-19 ENCOUNTER — TELEPHONE (OUTPATIENT)
Dept: NEUROLOGY | Facility: CLINIC | Age: 79
End: 2019-11-19

## 2019-11-19 NOTE — TELEPHONE ENCOUNTER
----- Message from Lesly Humphreys NP sent at 11/18/2019  2:44 PM CST -----  Follow up p 3-4 mos in [place] - needs 60 min for MoCA

## 2019-11-20 ENCOUNTER — TELEPHONE (OUTPATIENT)
Dept: NEUROLOGY | Facility: CLINIC | Age: 79
End: 2019-11-20

## 2019-11-20 NOTE — TELEPHONE ENCOUNTER
----- Message from Abram Poole sent at 11/20/2019  8:52 AM CST -----  Contact: Mr. Rios (son) @ [phone] or   Mr. Rios states he's returning Evangelina's call

## 2019-11-25 RX ORDER — TRAMADOL HYDROCHLORIDE AND ACETAMINOPHEN 37.5; 325 MG/1; MG/1
TABLET, FILM COATED ORAL
Qty: 28 TABLET | Refills: 1 | Status: SHIPPED | OUTPATIENT
Start: 2019-11-25 | End: 2019-11-26 | Stop reason: SDUPTHER

## 2019-11-26 RX ORDER — TRAMADOL HYDROCHLORIDE AND ACETAMINOPHEN 37.5; 325 MG/1; MG/1
TABLET, FILM COATED ORAL
Qty: 28 TABLET | Refills: 1 | Status: SHIPPED | OUTPATIENT
Start: 2019-11-26 | End: 2019-12-16 | Stop reason: SDUPTHER

## 2019-11-27 ENCOUNTER — TELEPHONE (OUTPATIENT)
Dept: ORTHOPEDICS | Facility: CLINIC | Age: 79
End: 2019-11-27

## 2019-11-27 RX ORDER — CLOPIDOGREL BISULFATE 75 MG/1
TABLET ORAL
Qty: 30 TABLET | Refills: 1 | Status: SHIPPED | OUTPATIENT
Start: 2019-11-27 | End: 2020-03-18

## 2019-11-27 NOTE — TELEPHONE ENCOUNTER
Returned Matthew's call regarding RX for pt. Dr. Hooper does not e-prescribe. Rx had to be called in to pharmacy. Informed pt the order has been successfully placed & he can pick it up from the pharmacy at his convenience. Pt appeased.

## 2019-11-27 NOTE — TELEPHONE ENCOUNTER
----- Message from Fan Arenas sent at 11/27/2019  8:22 AM CST -----  Contact: Son/Matthew Castro called in regarding the attached patient and her Rx for tramadol-acetaminophen 37.5-325 mg (ULTRACET) 37.5-325 mg Tab.  Pharmacy stated Rx was refilled but then received a call & office cancelled the Rx??    Matthew would like a call back at [phone] or [phone]      [facility]/pharmacy #[id number] - [place] - [address]  [address] [address]  Phone: [phone] Fax: [phone]

## 2019-12-05 RX ORDER — AMLODIPINE BESYLATE 10 MG/1
TABLET ORAL
Qty: 90 TABLET | Refills: 0 | Status: SHIPPED | OUTPATIENT
Start: 2019-12-05 | End: 2020-02-28

## 2019-12-11 ENCOUNTER — OFFICE VISIT (OUTPATIENT)
Dept: FAMILY MEDICINE | Facility: CLINIC | Age: 79
End: 2019-12-11
Payer: MEDICARE

## 2019-12-11 ENCOUNTER — LAB VISIT (OUTPATIENT)
Dept: LAB | Facility: HOSPITAL | Age: 79
End: 2019-12-11
Attending: FAMILY MEDICINE
Payer: MEDICARE

## 2019-12-11 VITALS
HEIGHT: 60 IN | HEART RATE: 62 BPM | BODY MASS INDEX: 38.87 KG/M2 | TEMPERATURE: 98 F | DIASTOLIC BLOOD PRESSURE: 78 MMHG | RESPIRATION RATE: 12 BRPM | WEIGHT: 198 LBS | SYSTOLIC BLOOD PRESSURE: 130 MMHG | OXYGEN SATURATION: 96 %

## 2019-12-11 DIAGNOSIS — M81.0 OSTEOPOROSIS, UNSPECIFIED OSTEOPOROSIS TYPE, UNSPECIFIED PATHOLOGICAL FRACTURE PRESENCE: ICD-10-CM

## 2019-12-11 DIAGNOSIS — N39.0 URINARY TRACT INFECTION WITHOUT HEMATURIA, SITE UNSPECIFIED: ICD-10-CM

## 2019-12-11 DIAGNOSIS — D75.839 THROMBOCYTOSIS: ICD-10-CM

## 2019-12-11 DIAGNOSIS — E55.9 VITAMIN D DEFICIENCY: ICD-10-CM

## 2019-12-11 DIAGNOSIS — E78.5 HYPERLIPIDEMIA WITH TARGET LDL LESS THAN 70: ICD-10-CM

## 2019-12-11 DIAGNOSIS — E11.9 CONTROLLED TYPE 2 DIABETES MELLITUS WITHOUT COMPLICATION, WITHOUT LONG-TERM CURRENT USE OF INSULIN: ICD-10-CM

## 2019-12-11 DIAGNOSIS — E61.1 IRON DEFICIENCY: ICD-10-CM

## 2019-12-11 DIAGNOSIS — I15.2 HYPERTENSION ASSOCIATED WITH DIABETES: Primary | ICD-10-CM

## 2019-12-11 DIAGNOSIS — E04.1 THYROID NODULE: ICD-10-CM

## 2019-12-11 DIAGNOSIS — E27.8 ADRENAL NODULE: ICD-10-CM

## 2019-12-11 DIAGNOSIS — E03.4 HYPOTHYROIDISM DUE TO ACQUIRED ATROPHY OF THYROID: ICD-10-CM

## 2019-12-11 DIAGNOSIS — E66.9 OBESITY, CLASS I, BMI 30-34.9: ICD-10-CM

## 2019-12-11 DIAGNOSIS — M15.9 PRIMARY OSTEOARTHRITIS INVOLVING MULTIPLE JOINTS: ICD-10-CM

## 2019-12-11 DIAGNOSIS — G47.33 OSA (OBSTRUCTIVE SLEEP APNEA): Chronic | ICD-10-CM

## 2019-12-11 DIAGNOSIS — Z23 FLU VACCINE NEED: ICD-10-CM

## 2019-12-11 DIAGNOSIS — E53.8 VITAMIN B12 DEFICIENCY: ICD-10-CM

## 2019-12-11 DIAGNOSIS — Z95.5 S/P CORONARY ARTERY STENT PLACEMENT: ICD-10-CM

## 2019-12-11 DIAGNOSIS — E11.59 HYPERTENSION ASSOCIATED WITH DIABETES: Primary | ICD-10-CM

## 2019-12-11 LAB
25(OH)D3+25(OH)D2 SERPL-MCNC: 57 NG/ML (ref 30–96)
ALBUMIN SERPL BCP-MCNC: 4.3 G/DL (ref 3.5–5.2)
ALP SERPL-CCNC: 114 U/L (ref 55–135)
ALT SERPL W/O P-5'-P-CCNC: 16 U/L (ref 10–44)
ANION GAP SERPL CALC-SCNC: 10 MMOL/L (ref 8–16)
AST SERPL-CCNC: 17 U/L (ref 10–40)
BASOPHILS # BLD AUTO: 0.04 K/UL (ref 0–0.2)
BASOPHILS NFR BLD: 0.4 % (ref 0–1.9)
BILIRUB SERPL-MCNC: 0.6 MG/DL (ref 0.1–1)
BUN SERPL-MCNC: 15 MG/DL (ref 8–23)
CALCIUM SERPL-MCNC: 9.9 MG/DL (ref 8.7–10.5)
CHLORIDE SERPL-SCNC: 104 MMOL/L (ref 95–110)
CHOLEST SERPL-MCNC: 121 MG/DL (ref 120–199)
CHOLEST/HDLC SERPL: 2.5 {RATIO} (ref 2–5)
CO2 SERPL-SCNC: 30 MMOL/L (ref 23–29)
CREAT SERPL-MCNC: 1 MG/DL (ref 0.5–1.4)
DIFFERENTIAL METHOD: ABNORMAL
EOSINOPHIL # BLD AUTO: 0.1 K/UL (ref 0–0.5)
EOSINOPHIL NFR BLD: 1.3 % (ref 0–8)
ERYTHROCYTE [DISTWIDTH] IN BLOOD BY AUTOMATED COUNT: 15.2 % (ref 11.5–14.5)
EST. GFR  (AFRICAN AMERICAN): >60 ML/MIN/1.73 M^2
EST. GFR  (NON AFRICAN AMERICAN): 53.7 ML/MIN/1.73 M^2
FERRITIN SERPL-MCNC: 94 NG/ML (ref 20–300)
GLUCOSE SERPL-MCNC: 82 MG/DL (ref 70–110)
HCT VFR BLD AUTO: 37.5 % (ref 37–48.5)
HDLC SERPL-MCNC: 49 MG/DL (ref 40–75)
HDLC SERPL: 40.5 % (ref 20–50)
HGB BLD-MCNC: 11.8 G/DL (ref 12–16)
IMM GRANULOCYTES # BLD AUTO: 0.03 K/UL (ref 0–0.04)
IMM GRANULOCYTES NFR BLD AUTO: 0.3 % (ref 0–0.5)
IRON SERPL-MCNC: 81 UG/DL (ref 30–160)
LDLC SERPL CALC-MCNC: 59 MG/DL (ref 63–159)
LYMPHOCYTES # BLD AUTO: 2.1 K/UL (ref 1–4.8)
LYMPHOCYTES NFR BLD: 21.9 % (ref 18–48)
MCH RBC QN AUTO: 27.8 PG (ref 27–31)
MCHC RBC AUTO-ENTMCNC: 31.5 G/DL (ref 32–36)
MCV RBC AUTO: 88 FL (ref 82–98)
MONOCYTES # BLD AUTO: 0.9 K/UL (ref 0.3–1)
MONOCYTES NFR BLD: 9.2 % (ref 4–15)
NEUTROPHILS # BLD AUTO: 6.5 K/UL (ref 1.8–7.7)
NEUTROPHILS NFR BLD: 66.9 % (ref 38–73)
NONHDLC SERPL-MCNC: 72 MG/DL
NRBC BLD-RTO: 0 /100 WBC
PLATELET # BLD AUTO: 361 K/UL (ref 150–350)
PMV BLD AUTO: 11.2 FL (ref 9.2–12.9)
POTASSIUM SERPL-SCNC: 3.8 MMOL/L (ref 3.5–5.1)
PROT SERPL-MCNC: 7.2 G/DL (ref 6–8.4)
RBC # BLD AUTO: 4.24 M/UL (ref 4–5.4)
SATURATED IRON: 25 % (ref 20–50)
SODIUM SERPL-SCNC: 144 MMOL/L (ref 136–145)
T4 FREE SERPL-MCNC: 1.14 NG/DL (ref 0.71–1.51)
TOTAL IRON BINDING CAPACITY: 329 UG/DL (ref 250–450)
TRANSFERRIN SERPL-MCNC: 222 MG/DL (ref 200–375)
TRIGL SERPL-MCNC: 65 MG/DL (ref 30–150)
TSH SERPL DL<=0.005 MIU/L-ACNC: 2.6 UIU/ML (ref 0.4–4)
VIT B12 SERPL-MCNC: 1422 PG/ML (ref 210–950)
WBC # BLD AUTO: 9.72 K/UL (ref 3.9–12.7)

## 2019-12-11 PROCEDURE — 82306 VITAMIN D 25 HYDROXY: CPT

## 2019-12-11 PROCEDURE — 80061 LIPID PANEL: CPT

## 2019-12-11 PROCEDURE — 36415 COLL VENOUS BLD VENIPUNCTURE: CPT | Mod: PO

## 2019-12-11 PROCEDURE — 82607 VITAMIN B-12: CPT

## 2019-12-11 PROCEDURE — 99214 OFFICE O/P EST MOD 30 MIN: CPT | Mod: 25,S$GLB,, | Performed by: FAMILY MEDICINE

## 2019-12-11 PROCEDURE — 85025 COMPLETE CBC W/AUTO DIFF WBC: CPT

## 2019-12-11 PROCEDURE — 99999 PR PBB SHADOW E&M-EST. PATIENT-LVL IV: CPT | Mod: PBBFAC,,, | Performed by: FAMILY MEDICINE

## 2019-12-11 PROCEDURE — 90662 FLU VACCINE - HIGH DOSE (65+) PRESERVATIVE FREE IM: ICD-10-PCS | Mod: S$GLB,,, | Performed by: FAMILY MEDICINE

## 2019-12-11 PROCEDURE — 99499 UNLISTED E&M SERVICE: CPT | Mod: S$GLB,,, | Performed by: FAMILY MEDICINE

## 2019-12-11 PROCEDURE — 83036 HEMOGLOBIN GLYCOSYLATED A1C: CPT

## 2019-12-11 PROCEDURE — 1125F AMNT PAIN NOTED PAIN PRSNT: CPT | Mod: S$GLB,,, | Performed by: FAMILY MEDICINE

## 2019-12-11 PROCEDURE — 3075F SYST BP GE 130 - 139MM HG: CPT | Mod: CPTII,S$GLB,, | Performed by: FAMILY MEDICINE

## 2019-12-11 PROCEDURE — 82728 ASSAY OF FERRITIN: CPT

## 2019-12-11 PROCEDURE — 99499 RISK ADDL DX/OHS AUDIT: ICD-10-PCS | Mod: S$GLB,,, | Performed by: FAMILY MEDICINE

## 2019-12-11 PROCEDURE — 90662 IIV NO PRSV INCREASED AG IM: CPT | Mod: S$GLB,,, | Performed by: FAMILY MEDICINE

## 2019-12-11 PROCEDURE — 1159F MED LIST DOCD IN RCRD: CPT | Mod: S$GLB,,, | Performed by: FAMILY MEDICINE

## 2019-12-11 PROCEDURE — 3078F DIAST BP <80 MM HG: CPT | Mod: CPTII,S$GLB,, | Performed by: FAMILY MEDICINE

## 2019-12-11 PROCEDURE — 83540 ASSAY OF IRON: CPT

## 2019-12-11 PROCEDURE — 84439 ASSAY OF FREE THYROXINE: CPT

## 2019-12-11 PROCEDURE — 1101F PT FALLS ASSESS-DOCD LE1/YR: CPT | Mod: CPTII,S$GLB,, | Performed by: FAMILY MEDICINE

## 2019-12-11 PROCEDURE — 84443 ASSAY THYROID STIM HORMONE: CPT

## 2019-12-11 PROCEDURE — 99999 PR PBB SHADOW E&M-EST. PATIENT-LVL IV: ICD-10-PCS | Mod: PBBFAC,,, | Performed by: FAMILY MEDICINE

## 2019-12-11 PROCEDURE — 80053 COMPREHEN METABOLIC PANEL: CPT

## 2019-12-11 PROCEDURE — 1159F PR MEDICATION LIST DOCUMENTED IN MEDICAL RECORD: ICD-10-PCS | Mod: S$GLB,,, | Performed by: FAMILY MEDICINE

## 2019-12-11 PROCEDURE — 3075F PR MOST RECENT SYSTOLIC BLOOD PRESS GE 130-139MM HG: ICD-10-PCS | Mod: CPTII,S$GLB,, | Performed by: FAMILY MEDICINE

## 2019-12-11 PROCEDURE — G0008 FLU VACCINE - HIGH DOSE (65+) PRESERVATIVE FREE IM: ICD-10-PCS | Mod: S$GLB,,, | Performed by: FAMILY MEDICINE

## 2019-12-11 PROCEDURE — G0008 ADMIN INFLUENZA VIRUS VAC: HCPCS | Mod: S$GLB,,, | Performed by: FAMILY MEDICINE

## 2019-12-11 PROCEDURE — 99214 PR OFFICE/OUTPT VISIT, EST, LEVL IV, 30-39 MIN: ICD-10-PCS | Mod: 25,S$GLB,, | Performed by: FAMILY MEDICINE

## 2019-12-11 PROCEDURE — 1101F PR PT FALLS ASSESS DOC 0-1 FALLS W/OUT INJ PAST YR: ICD-10-PCS | Mod: CPTII,S$GLB,, | Performed by: FAMILY MEDICINE

## 2019-12-11 PROCEDURE — 3078F PR MOST RECENT DIASTOLIC BLOOD PRESSURE < 80 MM HG: ICD-10-PCS | Mod: CPTII,S$GLB,, | Performed by: FAMILY MEDICINE

## 2019-12-11 PROCEDURE — 1125F PR PAIN SEVERITY QUANTIFIED, PAIN PRESENT: ICD-10-PCS | Mod: S$GLB,,, | Performed by: FAMILY MEDICINE

## 2019-12-11 RX ORDER — IBANDRONATE SODIUM 150 MG/1
150 TABLET, FILM COATED ORAL
Qty: 3 TABLET | Refills: 3 | Status: SHIPPED | OUTPATIENT
Start: 2019-12-11 | End: 2020-12-06

## 2019-12-11 NOTE — PROGRESS NOTES
Subjective:       Patient ID: Jerica Rios is a 79 y.o. female.    Chief Complaint: Establish Care    HPI  Review of Systems   Constitutional: Negative for activity change and unexpected weight change.   HENT: Negative for hearing loss, rhinorrhea and trouble swallowing.    Eyes: Negative for discharge and visual disturbance.   Respiratory: Negative for chest tightness and wheezing.    Cardiovascular: Negative for chest pain and palpitations.   Gastrointestinal: Negative for blood in stool, constipation, diarrhea and vomiting.   Endocrine: Negative for polydipsia and polyuria.   Genitourinary: Negative for difficulty urinating, dysuria, hematuria and menstrual problem.   Musculoskeletal: Positive for arthralgias and joint swelling. Negative for neck pain.   Neurological: Negative for weakness and headaches.   Psychiatric/Behavioral: Negative for confusion and dysphoric mood.       Patient Active Problem List   Diagnosis    Adrenal nodule    Allergic rhinosinusitis    GERD (gastroesophageal reflux disease)    Depression with anxiety    Chronic pain    Atherosclerosis of native coronary artery without angina pectoris    DEVONTE (obstructive sleep apnea), not on Rx due to cost, off 2015, diagnosed in 4/2014    Presbylarynx, lower pitch, hoarse voice    Thyroid nodule, see CT April 2014    S/P coronary artery stent placement, multiple, last in CFx 2010    Type 2 diabetes mellitus, controlled    Hyperlipidemia with target LDL less than 70, baseline LDL not available    Obesity, Class I, BMI 30-34.9, today 31.4    DDD (degenerative disc disease), lumbar    Hypertension associated with diabetes    Abdominal obesity    Sedentary lifestyle    Primary osteoarthritis involving multiple joints    WELLS (dyspnea on exertion)    Diastolic dysfunction    Thrombocytosis    Chronic fatigue    Hypothyroidism due to acquired atrophy of thyroid    Dementia without behavioral disturbance    Iron deficiency     Osteoporosis     Patient is here to establish care  Here with her son Matthew with whom she lives and he has POA. She has dementia and h/o frequent falls. Uses walker or rollator but occ forgets to use it. Also has a manual wheelchair for longer excursions.  Son noticed she was more unstable and falling more and had urine tested.  She did have a UTI and now completed abx.  He would like it retested to verify clearance.  She denies abd pain or burning. Has no vomiting or fever.       Neurology NP [facility] dementia  Ortho Dr. Nair tesha knee OA-takes ultracet 2 x day  CArd Dr. Jiang CAD s/p stents-multiple  Endocrine- Dr. De La Cruz/o thyroid nodules small non meeting FNA criteria 2015  last u/s; tesha adrenal adenomas last CT 2015 Bilateral adrenal adenomas, stable for 4 years.  Prior cholecystectomy.  Small cyst of the spleen.  Two cysts of the right kidney and one cyst of the left kidney.  Prominent amount of stool suggests constipation      Type 2 DM- needs eye exam -son will set up with new specialist.  On ARB and statin and on plavix. Last a1c 5.5 8/19    Osteoporosis- started boniva approx 2018  Objective:      Physical Exam   Constitutional: She is oriented to person, place, and time. She appears well-developed and well-nourished.   Cardiovascular: Normal rate, regular rhythm and normal heart sounds.   Pulses:       Dorsalis pedis pulses are 3+ on the right side, and 3+ on the left side.        Posterior tibial pulses are 3+ on the right side, and 3+ on the left side.   Pulmonary/Chest: Effort normal and breath sounds normal.   Musculoskeletal: She exhibits no edema.        Right foot: There is normal range of motion and no deformity.        Left foot: There is normal range of motion and no deformity.   Feet:   Right Foot:   Protective Sensation: 10 sites tested. 10 sites sensed.   Skin Integrity: Negative for ulcer, blister or skin breakdown.   Left Foot:   Protective Sensation: 10 sites tested. 10 sites sensed.    Skin Integrity: Negative for ulcer, blister or skin breakdown.   Neurological: She is alert and oriented to person, place, and time.   Skin: Skin is warm and dry.   Psychiatric: She has a normal mood and affect.   Nursing note and vitals reviewed.      Assessment:       1. Hypertension associated with diabetes    2. Hyperlipidemia with target LDL less than 70, baseline LDL not available    3. S/P coronary artery stent placement, multiple, last in CFx 2010    4. Thrombocytosis    5. Controlled type 2 diabetes mellitus without complication, without long-term current use of insulin    6. Obesity, Class I, BMI 30-34.9, today 31.4    7. Hypothyroidism due to acquired atrophy of thyroid    8. Iron deficiency    9. Primary osteoarthritis involving multiple joints    10. DEVONTE (obstructive sleep apnea), not on Rx due to cost, off 2015, diagnosed in 4/2014    11. Adrenal nodule    12. Thyroid nodule, see CT April 2014    13. Vitamin B12 deficiency    14. Vitamin D deficiency    15. Urinary tract infection without hematuria, site unspecified    16. Flu vaccine need    17. Osteoporosis, unspecified osteoporosis type, unspecified pathological fracture presence        Plan:         1. Hypertension associated with diabetes  Controlled on current medications.  Continue current medications.      2. Hyperlipidemia with target LDL less than 70, baseline LDL not available  Stable condition.  Continue current medications.  Will adjust based on lab findings or if condition changes.    - Comprehensive metabolic panel; Future  - Lipid panel; Future    3. S/P coronary artery stent placement, multiple, last in CFx 2010  Cont monitoring    4. Thrombocytosis  Screen and treat as indicated:      5. Controlled type 2 diabetes mellitus without complication, without long-term current use of insulin  Stable condition.  Continue current medications.  Will adjust based on lab findings or if condition changes.    - Hemoglobin A1c; Future    6.  Obesity, Class I, BMI 30-34.9, today 31.4  Counseled patient on his ideal body weight, health consequences of being obese and current recommendations including weekly exercise and a heart healthy diet.  Current BMI is:Estimated body mass index is 38.66 kg/m² as calculated from the following:    Height as of this encounter: 5' (1.524 m).    Weight as of this encounter: 89.8 kg (197 lb 15.6 oz)..  Patient is aware that ideal BMI < 25 or Weight in (lb) to have BMI = 25: 127.7.        7. Hypothyroidism due to acquired atrophy of thyroid  Stable condition.  Continue current medications.  Will adjust based on lab findings or if condition changes.    - CBC auto differential; Future  - TSH; Future  - T4, free; Future    8. Iron deficiency  Screen and treat as indicated:    - Iron and TIBC; Future  - Ferritin; Future    9. Primary osteoarthritis involving multiple joints  Cont current mgmt    10. DEVONTE (obstructive sleep apnea), not on Rx due to cost, off 2015, diagnosed in 4/2014  Recommend weight loss and side sleeping    11. Adrenal nodule  Stable    12. Thyroid nodule, see CT April 2014  monitor    13. Vitamin B12 deficiency  Screen and treat as indicated:    - Vitamin B12; Future    14. Vitamin D deficiency  Screen and treat as indicated:    - Vitamin D; Future    15. Urinary tract infection without hematuria, site unspecified  Verify clearance   - POCT urinalysis, dipstick or tablet reag  - Urine culture    16. Flu vaccine need  Immunize today.  Counseled patient on risks, benefits and side effects.  Patient elected to proceed with vaccination.    - Influenza - High Dose (65+) (PF) (IM)    17. Osteoporosis, unspecified osteoporosis type, unspecified pathological fracture presence  Continue  - ibandronate (BONIVA) 150 mg tablet; Take 1 tablet (150 mg total) by mouth every 30 days.  Dispense: 3 tablet; Refill: 3        Time spent with patient: 20 minutes    Patient with be reevaluated in 3 months or sooner prn    Greater  than 50% of this visit was spent counseling as described in above documentation:Yes

## 2019-12-12 LAB
ESTIMATED AVG GLUCOSE: 111 MG/DL (ref 68–131)
HBA1C MFR BLD HPLC: 5.5 % (ref 4–5.6)

## 2019-12-12 NOTE — PROGRESS NOTES
Patient, Jerica Rios (MRN #[id number]), presented with a recorded BMI of 38.66 kg/m^2 and a documented comorbidity(s):  - Diabetes Mellitus Type 2  - Obstructive Sleep Apnea  - Hypertension  - Hyperlipidemia  to which the severe obesity is a contributing factor. This is consistent with the definition of severe obesity (BMI 35.0-39.9) with comorbidity (ICD-10 E66.01, Z68.35). The patient's severe obesity was monitored, evaluated, addressed and/or treated. This addendum to the medical record is made on 12/11/2019.

## 2019-12-17 RX ORDER — TRAMADOL HYDROCHLORIDE AND ACETAMINOPHEN 37.5; 325 MG/1; MG/1
TABLET, FILM COATED ORAL
Qty: 28 TABLET | Refills: 1 | Status: SHIPPED | OUTPATIENT
Start: 2019-12-17 | End: 2021-09-15 | Stop reason: ALTCHOICE

## 2019-12-18 ENCOUNTER — LAB VISIT (OUTPATIENT)
Dept: LAB | Facility: HOSPITAL | Age: 79
End: 2019-12-18
Attending: FAMILY MEDICINE
Payer: MEDICARE

## 2019-12-18 DIAGNOSIS — N39.0 URINARY TRACT INFECTION WITHOUT HEMATURIA, SITE UNSPECIFIED: ICD-10-CM

## 2019-12-18 LAB
BILIRUB UR QL STRIP: NEGATIVE
CLARITY UR REFRACT.AUTO: CLEAR
COLOR UR AUTO: YELLOW
GLUCOSE UR QL STRIP: NEGATIVE
HGB UR QL STRIP: NEGATIVE
KETONES UR QL STRIP: NEGATIVE
LEUKOCYTE ESTERASE UR QL STRIP: NEGATIVE
MICROSCOPIC COMMENT: NORMAL
NITRITE UR QL STRIP: NEGATIVE
PH UR STRIP: 7 [PH] (ref 5–8)
PROT UR QL STRIP: NEGATIVE
SP GR UR STRIP: 1.02 (ref 1–1.03)
URN SPEC COLLECT METH UR: NORMAL

## 2019-12-18 PROCEDURE — 81001 URINALYSIS AUTO W/SCOPE: CPT

## 2019-12-18 PROCEDURE — 87086 URINE CULTURE/COLONY COUNT: CPT

## 2019-12-18 RX ORDER — TRAMADOL HYDROCHLORIDE AND ACETAMINOPHEN 37.5; 325 MG/1; MG/1
TABLET, FILM COATED ORAL
Qty: 28 TABLET | Refills: 1 | OUTPATIENT
Start: 2019-12-18

## 2019-12-19 LAB
BACTERIA UR CULT: NORMAL
BACTERIA UR CULT: NORMAL

## 2019-12-20 ENCOUNTER — PATIENT MESSAGE (OUTPATIENT)
Dept: NEUROLOGY | Facility: CLINIC | Age: 79
End: 2019-12-20

## 2020-01-07 ENCOUNTER — PATIENT MESSAGE (OUTPATIENT)
Dept: NEUROLOGY | Facility: CLINIC | Age: 80
End: 2020-01-07

## 2020-01-08 DIAGNOSIS — F03.90 DEMENTIA WITHOUT BEHAVIORAL DISTURBANCE, UNSPECIFIED DEMENTIA TYPE: ICD-10-CM

## 2020-01-08 RX ORDER — RIVASTIGMINE TARTRATE 4.5 MG/1
4.5 CAPSULE ORAL 2 TIMES DAILY
Qty: 180 CAPSULE | Refills: 3 | Status: SHIPPED | OUTPATIENT
Start: 2020-01-08 | End: 2020-12-07

## 2020-01-19 RX ORDER — ATORVASTATIN CALCIUM 80 MG/1
TABLET, FILM COATED ORAL
Qty: 90 TABLET | Refills: 3 | Status: SHIPPED | OUTPATIENT
Start: 2020-01-19 | End: 2021-01-07

## 2020-01-30 NOTE — ED NOTES
"Pt presents tot he ED with complaints of right hip pain s/p a mechanical trip and fall at home after her "right knee locked up on her." Pt has been unable to walk since the fall and was found on the ground by family. Bed rails are up and call light is within patient reach. Will continue to monitor. Family at the bedside.   " Hide Include Location In Plan Question?: No Detail Level: Zone

## 2020-02-28 RX ORDER — AMLODIPINE BESYLATE 10 MG/1
TABLET ORAL
Qty: 90 TABLET | Refills: 3 | Status: SHIPPED | OUTPATIENT
Start: 2020-02-28 | End: 2020-12-21

## 2020-03-04 ENCOUNTER — OFFICE VISIT (OUTPATIENT)
Dept: FAMILY MEDICINE | Facility: CLINIC | Age: 80
End: 2020-03-04
Payer: MEDICARE

## 2020-03-04 VITALS
TEMPERATURE: 99 F | SYSTOLIC BLOOD PRESSURE: 110 MMHG | BODY MASS INDEX: 38.82 KG/M2 | OXYGEN SATURATION: 94 % | HEIGHT: 60 IN | WEIGHT: 197.75 LBS | HEART RATE: 74 BPM | DIASTOLIC BLOOD PRESSURE: 56 MMHG

## 2020-03-04 DIAGNOSIS — E03.4 HYPOTHYROIDISM DUE TO ACQUIRED ATROPHY OF THYROID: ICD-10-CM

## 2020-03-04 DIAGNOSIS — E11.59 HYPERTENSION ASSOCIATED WITH DIABETES: Primary | ICD-10-CM

## 2020-03-04 DIAGNOSIS — E11.9 CONTROLLED TYPE 2 DIABETES MELLITUS WITHOUT COMPLICATION, WITHOUT LONG-TERM CURRENT USE OF INSULIN: ICD-10-CM

## 2020-03-04 DIAGNOSIS — E78.5 HYPERLIPIDEMIA WITH TARGET LDL LESS THAN 70: ICD-10-CM

## 2020-03-04 DIAGNOSIS — F03.90 DEMENTIA WITHOUT BEHAVIORAL DISTURBANCE, UNSPECIFIED DEMENTIA TYPE: ICD-10-CM

## 2020-03-04 DIAGNOSIS — I15.2 HYPERTENSION ASSOCIATED WITH DIABETES: Primary | ICD-10-CM

## 2020-03-04 PROCEDURE — 1101F PT FALLS ASSESS-DOCD LE1/YR: CPT | Mod: CPTII,S$GLB,, | Performed by: NURSE PRACTITIONER

## 2020-03-04 PROCEDURE — 3074F PR MOST RECENT SYSTOLIC BLOOD PRESSURE < 130 MM HG: ICD-10-PCS | Mod: CPTII,S$GLB,, | Performed by: NURSE PRACTITIONER

## 2020-03-04 PROCEDURE — 99999 PR PBB SHADOW E&M-EST. PATIENT-LVL III: ICD-10-PCS | Mod: PBBFAC,,, | Performed by: NURSE PRACTITIONER

## 2020-03-04 PROCEDURE — 3078F DIAST BP <80 MM HG: CPT | Mod: CPTII,S$GLB,, | Performed by: NURSE PRACTITIONER

## 2020-03-04 PROCEDURE — 3074F SYST BP LT 130 MM HG: CPT | Mod: CPTII,S$GLB,, | Performed by: NURSE PRACTITIONER

## 2020-03-04 PROCEDURE — 1101F PR PT FALLS ASSESS DOC 0-1 FALLS W/OUT INJ PAST YR: ICD-10-PCS | Mod: CPTII,S$GLB,, | Performed by: NURSE PRACTITIONER

## 2020-03-04 PROCEDURE — 1159F MED LIST DOCD IN RCRD: CPT | Mod: S$GLB,,, | Performed by: NURSE PRACTITIONER

## 2020-03-04 PROCEDURE — 1126F AMNT PAIN NOTED NONE PRSNT: CPT | Mod: S$GLB,,, | Performed by: NURSE PRACTITIONER

## 2020-03-04 PROCEDURE — 1126F PR PAIN SEVERITY QUANTIFIED, NO PAIN PRESENT: ICD-10-PCS | Mod: S$GLB,,, | Performed by: NURSE PRACTITIONER

## 2020-03-04 PROCEDURE — 99214 OFFICE O/P EST MOD 30 MIN: CPT | Mod: S$GLB,,, | Performed by: NURSE PRACTITIONER

## 2020-03-04 PROCEDURE — 99214 PR OFFICE/OUTPT VISIT, EST, LEVL IV, 30-39 MIN: ICD-10-PCS | Mod: S$GLB,,, | Performed by: NURSE PRACTITIONER

## 2020-03-04 PROCEDURE — 99499 RISK ADDL DX/OHS AUDIT: ICD-10-PCS | Mod: S$GLB,,, | Performed by: NURSE PRACTITIONER

## 2020-03-04 PROCEDURE — 99999 PR PBB SHADOW E&M-EST. PATIENT-LVL III: CPT | Mod: PBBFAC,,, | Performed by: NURSE PRACTITIONER

## 2020-03-04 PROCEDURE — 1159F PR MEDICATION LIST DOCUMENTED IN MEDICAL RECORD: ICD-10-PCS | Mod: S$GLB,,, | Performed by: NURSE PRACTITIONER

## 2020-03-04 PROCEDURE — 99499 UNLISTED E&M SERVICE: CPT | Mod: S$GLB,,, | Performed by: NURSE PRACTITIONER

## 2020-03-04 PROCEDURE — 3078F PR MOST RECENT DIASTOLIC BLOOD PRESSURE < 80 MM HG: ICD-10-PCS | Mod: CPTII,S$GLB,, | Performed by: NURSE PRACTITIONER

## 2020-03-04 RX ORDER — MEMANTINE HYDROCHLORIDE 10 MG/1
10 TABLET ORAL 2 TIMES DAILY
Qty: 180 TABLET | Refills: 3 | Status: CANCELLED | OUTPATIENT
Start: 2020-03-04

## 2020-03-05 RX ORDER — MEMANTINE HYDROCHLORIDE 10 MG/1
10 TABLET ORAL 2 TIMES DAILY
Qty: 180 TABLET | Refills: 3 | Status: SHIPPED | OUTPATIENT
Start: 2020-03-05 | End: 2021-06-07

## 2020-03-14 NOTE — PROGRESS NOTES
Subjective:       Patient ID: Jerica Rios is a 80 y.o. female.    Chief Complaint: Follow-up    HPI   Ms. Rios is an 79 yo female who presents today for routine follow up.  She presents with her son.  She had a diagnosis of dementia which is managed by neurology.  This is currently stable on current medications.  She was being followed by Dr. Galeas in endocrine but her son says it has been some time since she has seen endocrine, and they don't have plans to currently.  She is due for fasting labs.  She is without acute complaint today.  She ambulates with use of a rollator.    Vitals:    03/04/20 1616   BP: (!) 110/56   Pulse: 74   Temp: 98.5 °F (36.9 °C)     Review of Systems   Constitutional: Negative for activity change and unexpected weight change.   HENT: Negative for hearing loss, rhinorrhea and trouble swallowing.    Eyes: Negative for discharge and visual disturbance.   Respiratory: Negative for chest tightness and wheezing.    Cardiovascular: Negative for chest pain and palpitations.   Gastrointestinal: Negative for blood in stool, constipation, diarrhea and vomiting.   Endocrine: Negative for polydipsia and polyuria.   Genitourinary: Negative for difficulty urinating, dysuria, hematuria and menstrual problem.   Musculoskeletal: Positive for arthralgias and joint swelling. Negative for neck pain.   Neurological: Positive for weakness. Negative for headaches.   Psychiatric/Behavioral: Positive for confusion. Negative for dysphoric mood.       Objective:      Physical Exam   Constitutional: She is oriented to person, place, and time. She appears well-developed and well-nourished.   HENT:   Head: Normocephalic and atraumatic.   Eyes: Pupils are equal, round, and reactive to light. Conjunctivae are normal. Right eye exhibits no discharge. Left eye exhibits no discharge.   Neck: Normal range of motion. Neck supple. No thyromegaly present.   Cardiovascular: Normal rate, regular rhythm, normal heart  sounds and intact distal pulses.   Pulmonary/Chest: Breath sounds normal. No respiratory distress. She has no wheezes.   Abdominal: Soft. Bowel sounds are normal. She exhibits no distension. There is no tenderness. There is no rebound.   Musculoskeletal: Normal range of motion. She exhibits no edema or deformity.   Lymphadenopathy:     She has no cervical adenopathy.   Neurological: She is alert and oriented to person, place, and time. No cranial nerve deficit or sensory deficit.   Skin: Skin is warm and dry. No rash noted.   Psychiatric: She has a normal mood and affect.       Assessment & Plan:       Hypertension associated with diabetes  -     CBC auto differential; Future; Expected date: 03/04/2020        -    Controlled, continue current medication regimen  Dementia without behavioral disturbance, unspecified dementia type  -Stable, continue current medication regimen  -Continue regular follow up with neurology   Hyperlipidemia with target LDL less than 70, baseline LDL not available  -     Comprehensive metabolic panel; Future; Expected date: 03/04/2020  -     Lipid panel; Future; Expected date: 03/04/2020        -    Screen and treat as indicated        -     Continue current medication regimen   Controlled type 2 diabetes mellitus without complication, without long-term current use of insulin  -     Comprehensive metabolic panel; Future; Expected date: 03/04/2020  -     Hemoglobin A1c; Future; Expected date: 03/04/2020        -     Will follow up with results of above when received         -    Continue current medication regimen for now   Hypothyroidism due to acquired atrophy of thyroid  -     TSH; Future; Expected date: 03/04/2020  -     T4, free; Future; Expected date: 03/04/2020        -     Will follow up with results of above when received        -     Continue current medication regimen for now         Follow up in about 3 months (around 6/4/2020) for Dr. Davis- chronic conditions .

## 2020-03-16 RX ORDER — IRBESARTAN 300 MG/1
300 TABLET ORAL NIGHTLY
Qty: 90 TABLET | Refills: 2 | Status: SHIPPED | OUTPATIENT
Start: 2020-03-16 | End: 2020-12-16

## 2020-03-16 NOTE — PROGRESS NOTES
Patient, Jerica Rios (MRN #[id number]), presented with a recorded BMI of 38.62 kg/m^2 and a documented comorbidity(s):  - Diabetes Mellitus Type 2  - Hypertension  - Hyperlipidemia  to which the severe obesity is a contributing factor. This is consistent with the definition of severe obesity (BMI 35.0-39.9) with comorbidity (ICD-10 E66.01, Z68.35). The patient's severe obesity was monitored, evaluated, addressed and/or treated. This addendum to the medical record is made on 03/16/2020.

## 2020-03-18 RX ORDER — CLOPIDOGREL BISULFATE 75 MG/1
TABLET ORAL
Qty: 30 TABLET | Refills: 1 | Status: SHIPPED | OUTPATIENT
Start: 2020-03-18 | End: 2020-04-13

## 2020-04-13 RX ORDER — CLOPIDOGREL BISULFATE 75 MG/1
TABLET ORAL
Qty: 30 TABLET | Refills: 3 | Status: SHIPPED | OUTPATIENT
Start: 2020-04-13 | End: 2020-07-09

## 2020-04-15 RX ORDER — BACLOFEN 10 MG/1
10 TABLET ORAL DAILY
Qty: 90 TABLET | Refills: 2 | Status: SHIPPED | OUTPATIENT
Start: 2020-04-15 | End: 2020-12-21 | Stop reason: SDUPTHER

## 2020-05-05 ENCOUNTER — PATIENT MESSAGE (OUTPATIENT)
Dept: ADMINISTRATIVE | Facility: HOSPITAL | Age: 80
End: 2020-05-05

## 2020-05-14 DIAGNOSIS — E03.9 HYPOTHYROIDISM: ICD-10-CM

## 2020-05-14 RX ORDER — LEVOTHYROXINE SODIUM 50 UG/1
TABLET ORAL
Qty: 90 TABLET | Refills: 3 | Status: SHIPPED | OUTPATIENT
Start: 2020-05-14 | End: 2021-06-10

## 2020-05-20 NOTE — TELEPHONE ENCOUNTER
Spoke to at [facility] [[email or web address] pharmacy #[id number] clonazepam 0.5mg take 1 tab po 1-2 times a day as needed 30 tabs 0 refills   Additional Notes: Present for the last few months\\nGrowing\\nWill start authorization for excision Detail Level: Simple

## 2020-07-09 RX ORDER — CLOPIDOGREL BISULFATE 75 MG/1
TABLET ORAL
Qty: 90 TABLET | Refills: 3 | Status: SHIPPED | OUTPATIENT
Start: 2020-07-09 | End: 2021-06-29

## 2020-07-31 DIAGNOSIS — F41.8 DEPRESSION WITH ANXIETY: ICD-10-CM

## 2020-07-31 RX ORDER — SERTRALINE HYDROCHLORIDE 100 MG/1
100 TABLET, FILM COATED ORAL DAILY
Qty: 90 TABLET | Refills: 3 | Status: SHIPPED | OUTPATIENT
Start: 2020-07-31 | End: 2021-08-10

## 2020-08-20 ENCOUNTER — PATIENT MESSAGE (OUTPATIENT)
Dept: FAMILY MEDICINE | Facility: CLINIC | Age: 80
End: 2020-08-20

## 2020-08-21 RX ORDER — POTASSIUM CHLORIDE 750 MG/1
10 CAPSULE, EXTENDED RELEASE ORAL DAILY
Qty: 90 CAPSULE | Refills: 3 | Status: SHIPPED | OUTPATIENT
Start: 2020-08-21 | End: 2021-09-15 | Stop reason: SDUPTHER

## 2020-08-21 RX ORDER — POTASSIUM CHLORIDE 750 MG/1
10 CAPSULE, EXTENDED RELEASE ORAL DAILY
Qty: 90 CAPSULE | Refills: 3 | Status: SHIPPED | OUTPATIENT
Start: 2020-08-21 | End: 2021-09-15

## 2020-08-28 ENCOUNTER — TELEPHONE (OUTPATIENT)
Dept: FAMILY MEDICINE | Facility: CLINIC | Age: 80
End: 2020-08-28

## 2020-08-28 DIAGNOSIS — E11.9 TYPE 2 DIABETES MELLITUS WITHOUT COMPLICATION: ICD-10-CM

## 2020-08-28 RX ORDER — HYDROCHLOROTHIAZIDE 25 MG/1
25 TABLET ORAL DAILY
Qty: 90 TABLET | Refills: 0 | Status: SHIPPED | OUTPATIENT
Start: 2020-08-28 | End: 2020-12-21

## 2020-08-28 RX ORDER — METFORMIN HYDROCHLORIDE 500 MG/1
TABLET ORAL
Qty: 180 TABLET | Refills: 0 | Status: SHIPPED | OUTPATIENT
Start: 2020-08-28 | End: 2020-12-24

## 2020-08-28 NOTE — TELEPHONE ENCOUNTER
Spoke to Patient's son. VV made 9/2/20 per patient request r/t COVID-19, did not want to come in office.

## 2020-08-28 NOTE — TELEPHONE ENCOUNTER
----- Message from Shelli Wolfe sent at 8/28/2020  3:25 PM CDT -----  Type:  Patient Returning Call    Who Called:  Son (Matthew)  Who Left Message for Patient:  Eloina  Does the patient know what this is regarding?:  patient  Best Call Back Number:  [phone]  Additional Information:

## 2020-09-02 ENCOUNTER — OFFICE VISIT (OUTPATIENT)
Dept: FAMILY MEDICINE | Facility: CLINIC | Age: 80
End: 2020-09-02
Payer: MEDICARE

## 2020-09-02 DIAGNOSIS — M15.9 PRIMARY OSTEOARTHRITIS INVOLVING MULTIPLE JOINTS: ICD-10-CM

## 2020-09-02 DIAGNOSIS — F41.8 DEPRESSION WITH ANXIETY: ICD-10-CM

## 2020-09-02 DIAGNOSIS — Z95.5 S/P CORONARY ARTERY STENT PLACEMENT: ICD-10-CM

## 2020-09-02 DIAGNOSIS — E03.4 HYPOTHYROIDISM DUE TO ACQUIRED ATROPHY OF THYROID: ICD-10-CM

## 2020-09-02 DIAGNOSIS — E78.5 HYPERLIPIDEMIA WITH TARGET LDL LESS THAN 70: ICD-10-CM

## 2020-09-02 DIAGNOSIS — I15.2 HYPERTENSION ASSOCIATED WITH DIABETES: ICD-10-CM

## 2020-09-02 DIAGNOSIS — E66.9 OBESITY, CLASS I, BMI 30-34.9: ICD-10-CM

## 2020-09-02 DIAGNOSIS — E11.9 CONTROLLED TYPE 2 DIABETES MELLITUS WITHOUT COMPLICATION, WITHOUT LONG-TERM CURRENT USE OF INSULIN: Primary | ICD-10-CM

## 2020-09-02 DIAGNOSIS — M81.0 OSTEOPOROSIS, UNSPECIFIED OSTEOPOROSIS TYPE, UNSPECIFIED PATHOLOGICAL FRACTURE PRESENCE: ICD-10-CM

## 2020-09-02 DIAGNOSIS — E11.59 HYPERTENSION ASSOCIATED WITH DIABETES: ICD-10-CM

## 2020-09-02 PROCEDURE — 99499 UNLISTED E&M SERVICE: CPT | Mod: 95,,, | Performed by: NURSE PRACTITIONER

## 2020-09-02 PROCEDURE — 1101F PR PT FALLS ASSESS DOC 0-1 FALLS W/OUT INJ PAST YR: ICD-10-PCS | Mod: CPTII,95,, | Performed by: NURSE PRACTITIONER

## 2020-09-02 PROCEDURE — 1159F PR MEDICATION LIST DOCUMENTED IN MEDICAL RECORD: ICD-10-PCS | Mod: 95,,, | Performed by: NURSE PRACTITIONER

## 2020-09-02 PROCEDURE — 99499 RISK ADDL DX/OHS AUDIT: ICD-10-PCS | Mod: 95,,, | Performed by: NURSE PRACTITIONER

## 2020-09-02 PROCEDURE — 1159F MED LIST DOCD IN RCRD: CPT | Mod: 95,,, | Performed by: NURSE PRACTITIONER

## 2020-09-02 PROCEDURE — 99214 PR OFFICE/OUTPT VISIT, EST, LEVL IV, 30-39 MIN: ICD-10-PCS | Mod: 95,,, | Performed by: NURSE PRACTITIONER

## 2020-09-02 PROCEDURE — 1101F PT FALLS ASSESS-DOCD LE1/YR: CPT | Mod: CPTII,95,, | Performed by: NURSE PRACTITIONER

## 2020-09-02 PROCEDURE — 99214 OFFICE O/P EST MOD 30 MIN: CPT | Mod: 95,,, | Performed by: NURSE PRACTITIONER

## 2020-09-02 NOTE — PROGRESS NOTES
Subjective:       Patient ID: Jerica Rios is a 80 y.o. female.    Chief Complaint: No chief complaint on file.    The patient location is: [place], [place].  The chief complaint leading to consultation is: chronic conditions follow up    Visit type: audiovisual    Face to Face time with patient: 20 minutes of total time spent on the encounter, which includes face to face time and non-face to face time preparing to see the patient (eg, review of tests), Obtaining and/or reviewing separately obtained history, Documenting clinical information in the electronic or other health record, Independently interpreting results (not separately reported) and communicating results to the patient/family/caregiver, or Care coordination (not separately reported).         Each patient to whom he or she provides medical services by telemedicine is:  (1) informed of the relationship between the physician and patient and the respective role of any other health care provider with respect to management of the patient; and (2) notified that he or she may decline to receive medical services by telemedicine and may withdraw from such care at any time.    Notes:      Patient presents today with son via Telemedicine for chronic condition follow up. Last labs 12/18/19. Discussed with son patient needs to have labs done, she needs an Eye exam, and Dexa Scan. Keena verbalizes understanding.    Neurology NP [facility] dementia  Ortho Dr. Nair tesha knee OA-takes ultracet 2 x day-patients takes as needed.  CArd Dr. Jiang CAD s/p stents-multiple  Endocrine- Dr. De La Cruz/o thyroid nodules small non meeting FNA criteria 2015  last u/s; tesha adrenal adenomas last CT 2015 Bilateral adrenal adenomas, stable for 4 years.  Prior cholecystectomy.  Small cyst of the spleen.  Two cysts of the right kidney and one cyst of the left kidney.  Prominent amount of stool suggests constipation       Type 2 DM- needs eye exam -son will set up with new specialist.  On ARB  and statin and on plavix. Last a1c 5.5 8/19     Osteoporosis- started boniva approx 2018  Past Medical History:   Diagnosis Date    Allergy     Arthritis     Carotid artery disease     Coronary artery disease     Diabetes mellitus     Diabetes mellitus, type 2     Fall at home     GERD (gastroesophageal reflux disease)     Headache(784.0)     Hyperlipidemia     Hypertension     Mental disorder     Obesity     Right rib fracture     Snoring     Toe fracture, right        Review of patient's allergies indicates:   Allergen Reactions    Amoxicillin-pot clavulanate      Other reaction(s): CONSTIPATION    Hydrocodone-acetaminophen Other (See Comments)     Other reaction(s): irritable  Other reaction(s): aggressive    Meperidine Other (See Comments)     Other reaction(s): irritability  Other reaction(s): aggressiveness    Propoxyphene n-acetaminophen      Other reaction(s): irritable  Other reaction(s): aggressive    Sulfa (sulfonamide antibiotics)      Other reaction(s): Rash         Current Outpatient Medications:     amLODIPine (NORVASC) 10 MG tablet, TAKE 1 TABLET BY MOUTH EVERY DAY, Disp: 90 tablet, Rfl: 3    atorvastatin (LIPITOR) 80 MG tablet, TAKE 1 TABLET BY MOUTH EVERY DAY, Disp: 90 tablet, Rfl: 3    baclofen (LIORESAL) 10 MG tablet, Take 1 tablet (10 mg total) by mouth once daily., Disp: 90 tablet, Rfl: 2    clopidogreL (PLAVIX) 75 mg tablet, TAKE 1 TABLET BY MOUTH EVERY DAY -MUST FOLLOW UP WITH CARDIOLOGIST, Disp: 90 tablet, Rfl: 3    esomeprazole (NEXIUM) 20 MG capsule, Take 20 mg by mouth once daily. Patient takes OTC nexium, Disp: , Rfl:     ferrous sulfate 324 mg (65 mg iron) TbEC, Take 325 mg by mouth 2 (two) times daily., Disp: , Rfl:     hydroCHLOROthiazide (HYDRODIURIL) 25 MG tablet, Take 1 tablet (25 mg total) by mouth once daily., Disp: 90 tablet, Rfl: 0    ibandronate (BONIVA) 150 mg tablet, Take 1 tablet (150 mg total) by mouth every 30 days., Disp: 3 tablet, Rfl: 3     irbesartan (AVAPRO) 300 MG tablet, Take 1 tablet (300 mg total) by mouth every evening., Disp: 90 tablet, Rfl: 2    levothyroxine (SYNTHROID) 50 MCG tablet, TAKE 1 TABLET BY MOUTH EVERY DAY IN THE MORNING ON EMPTY STOMACH, Disp: 90 tablet, Rfl: 3    magnesium oxide (MAG-OXIDE) 400 mg tablet, Take 1 tablet (400 mg total) by mouth once daily. Every day, Disp: 90 tablet, Rfl: 3    melatonin 3 mg Tab, Take 3 mg by mouth nightly., Disp: , Rfl:     memantine (NAMENDA) 10 MG Tab, Take 1 tablet (10 mg total) by mouth 2 (two) times daily., Disp: 180 tablet, Rfl: 3    metFORMIN (GLUCOPHAGE) 500 MG tablet, TAKE 1 TABLET (500 MG TOTAL) BY MOUTH 2 (TWO) TIMES DAILY WITH MEALS., Disp: 180 tablet, Rfl: 0    MULTIVITAMIN ORAL, Take 1 Package by mouth once daily. Pt takes Diabetic Vitamin Pack, Disp: , Rfl:     nitroGLYCERIN (NITROSTAT) 0.4 MG SL tablet, Place 1 tablet (0.4 mg total) under the tongue every 5 (five) minutes as needed for Chest pain. As directed PRN (Patient taking differently: Place 0.4 mg under the tongue every 5 (five) minutes as needed for Chest pain. As directed for chest pain.  Seek medical help if pain is not relieved by the third dose.), Disp: 100 tablet, Rfl: 3    potassium chloride (KLOR-CON SPRINKLE) 10 MEQ CpSR, Take 1 capsule (10 mEq total) by mouth once daily., Disp: 90 capsule, Rfl: 3    potassium chloride (KLOR-CON SPRINKLE) 10 MEQ CpSR, Take 1 capsule (10 mEq total) by mouth once daily., Disp: 90 capsule, Rfl: 3    rivastigmine tartrate 4.5 mg capsule, Take 1 capsule (4.5 mg total) by mouth 2 (two) times daily., Disp: 180 capsule, Rfl: 3    sertraline (ZOLOFT) 100 MG tablet, Take 1 tablet (100 mg total) by mouth once daily., Disp: 90 tablet, Rfl: 3    tramadol-acetaminophen 37.5-325 mg (ULTRACET) 37.5-325 mg Tab, TAKE 1 TABLET BY MOUTH EVERY 6 HOURS AS NEEDED FOR PAIN, Disp: 28 tablet, Rfl: 1    turmeric root extract 500 mg Cap, Take 1 capsule by mouth 3 (three) times daily. , Disp: ,  Rfl:     vitamin D 1000 units Tab, Take 1,000 Units by mouth once daily. , Disp: , Rfl:     Review of Systems   Constitutional: Negative for unexpected weight change.   HENT: Negative for trouble swallowing.    Eyes: Negative for visual disturbance.   Respiratory: Negative for shortness of breath.    Cardiovascular: Negative for chest pain, palpitations and leg swelling.   Gastrointestinal: Negative for blood in stool.   Genitourinary: Negative for hematuria.   Musculoskeletal: Positive for arthralgias (right knee).   Skin: Negative for rash.   Allergic/Immunologic: Negative for immunocompromised state.   Neurological: Negative for headaches.   Hematological: Does not bruise/bleed easily.   Psychiatric/Behavioral: Negative for agitation. The patient is not nervous/anxious.        Objective:      There were no vitals taken for this visit.  Physical Exam  Constitutional:       Appearance: She is well-developed.   Pulmonary:      Effort: Pulmonary effort is normal.   Neurological:      Mental Status: She is alert and oriented to person, place, and time.   Psychiatric:         Behavior: Behavior normal.         Thought Content: Thought content normal.         Judgment: Judgment normal.         Assessment:       1. Controlled type 2 diabetes mellitus without complication, without long-term current use of insulin    2. Hypertension associated with diabetes    3. Hyperlipidemia with target LDL less than 70, baseline LDL not available    4. Hypothyroidism due to acquired atrophy of thyroid    5. S/P coronary artery stent placement, multiple, last in CFx 2010    6. Primary osteoarthritis involving multiple joints    7. Depression with anxiety    8. Osteoporosis, unspecified osteoporosis type, unspecified pathological fracture presence    9. Obesity, Class I, BMI 30-34.9, today 31.4        Plan:       Controlled type 2 diabetes mellitus without complication, without long-term current use of insulin  Stable condition.   Continue current medications.  Will adjust based on lab findings or if condition changes  Hemoglobin A1C   Date Value Ref Range Status   12/11/2019 5.5 4.0 - 5.6 % Final     Comment:     ADA Screening Guidelines:  5.7-6.4%  Consistent with prediabetes  >or=6.5%  Consistent with diabetes  High levels of fetal hemoglobin interfere with the HbA1C  assay. Heterozygous hemoglobin variants (HbS, HgC, etc)do  not significantly interfere with this assay.   However, presence of multiple variants may affect accuracy.     08/07/2019 5.5 4.0 - 5.6 % Final     Comment:     ADA Screening Guidelines:  5.7-6.4%  Consistent with prediabetes  >or=6.5%  Consistent with diabetes  High levels of fetal hemoglobin interfere with the HbA1C  assay. Heterozygous hemoglobin variants (HbS, HgC, etc)do  not significantly interfere with this assay.   However, presence of multiple variants may affect accuracy.     12/12/2018 5.4 4.0 - 5.6 % Final     Comment:     ADA Screening Guidelines:  5.7-6.4%  Consistent with prediabetes  >or=6.5%  Consistent with diabetes  High levels of fetal hemoglobin interfere with the HbA1C  assay. Heterozygous hemoglobin variants (HbS, HgC, etc)do  not significantly interfere with this assay.   However, presence of multiple variants may affect accuracy.       Hypertension associated with diabetes  Stable, continue medication  Low sodium diet  Hyperlipidemia with target LDL less than 70, baseline LDL not available  Stable, on Lipitor  Hypothyroidism due to acquired atrophy of thyroid  Stable condition.  Continue current medications.  Will adjust based on lab findings or if condition changes  S/P coronary artery stent placement, multiple, last in CFx 2010  Stable, on Plavix  Primary osteoarthritis involving multiple joints  Stable, continue medicaiton  Depression with anxiety  Stable, continue medication  Osteoporosis, unspecified osteoporosis type, unspecified pathological fracture presence  -     DXA Bone Density  Spine And Hip; Future; Expected date: 09/02/2020  On Boniva  Obesity, Class I, BMI 30-34.9, today 31.4  Counseled patient on his ideal body weight, health consequences of being obese and current recommendations including weekly exercise and a heart healthy diet.  Current BMI is:Estimated body mass index is 38.62 kg/m² as calculated from the following:    Height as of 3/4/20: 5' (1.524 m).    Weight as of 3/4/20: 89.7 kg (197 lb 12 oz)..  Patient is aware that ideal BMI < 25 or  .            Patient readiness: acceptance and barriers:none    During the course of the visit the patient was educated and counseled about the following:     Diabetes:  Discussed general issues about diabetes pathophysiology and management.  Encouraged aerobic exercise.  Hypertension:   Dietary sodium restriction.  Regular aerobic exercise.  Obesity:   General weight loss/lifestyle modification strategies discussed (elicit support from others; identify saboteurs; non-food rewards, etc).  Regular aerobic exercise program discussed.    Goals: Diabetes: Maintain Hemoglobin A1C below 7, Hypertension: Reduce Blood Pressure and Obesity: Reduce calorie intake and BMI    Did patient meet goals/outcomes: Yes    The following self management tools provided: declined    Patient Instructions (the written plan) was given to the patient/family.     Time spent with patient: 15 minutes    Barriers to medications present (no )    Adverse reactions to current medications (no)    Over the counter medications reviewed (Yes)

## 2020-10-15 ENCOUNTER — PES CALL (OUTPATIENT)
Dept: ADMINISTRATIVE | Facility: CLINIC | Age: 80
End: 2020-10-15

## 2020-10-21 ENCOUNTER — OFFICE VISIT (OUTPATIENT)
Dept: HOME HEALTH SERVICES | Facility: CLINIC | Age: 80
End: 2020-10-21
Payer: MEDICARE

## 2020-10-21 VITALS
DIASTOLIC BLOOD PRESSURE: 76 MMHG | HEIGHT: 60 IN | HEART RATE: 77 BPM | SYSTOLIC BLOOD PRESSURE: 135 MMHG | WEIGHT: 198 LBS | BODY MASS INDEX: 38.87 KG/M2

## 2020-10-21 DIAGNOSIS — M15.9 PRIMARY OSTEOARTHRITIS INVOLVING MULTIPLE JOINTS: ICD-10-CM

## 2020-10-21 DIAGNOSIS — E11.59 HYPERTENSION ASSOCIATED WITH DIABETES: ICD-10-CM

## 2020-10-21 DIAGNOSIS — E66.9 OBESITY, CLASS I, BMI 30-34.9: ICD-10-CM

## 2020-10-21 DIAGNOSIS — I15.2 HYPERTENSION ASSOCIATED WITH DIABETES: ICD-10-CM

## 2020-10-21 DIAGNOSIS — M81.0 OSTEOPOROSIS, UNSPECIFIED OSTEOPOROSIS TYPE, UNSPECIFIED PATHOLOGICAL FRACTURE PRESENCE: ICD-10-CM

## 2020-10-21 DIAGNOSIS — E04.1 THYROID NODULE: ICD-10-CM

## 2020-10-21 DIAGNOSIS — F03.90 DEMENTIA WITHOUT BEHAVIORAL DISTURBANCE, UNSPECIFIED DEMENTIA TYPE: ICD-10-CM

## 2020-10-21 DIAGNOSIS — G47.33 OSA (OBSTRUCTIVE SLEEP APNEA): Chronic | ICD-10-CM

## 2020-10-21 DIAGNOSIS — G89.4 CHRONIC PAIN SYNDROME: ICD-10-CM

## 2020-10-21 DIAGNOSIS — E11.9 CONTROLLED TYPE 2 DIABETES MELLITUS WITHOUT COMPLICATION, WITHOUT LONG-TERM CURRENT USE OF INSULIN: ICD-10-CM

## 2020-10-21 DIAGNOSIS — E03.4 HYPOTHYROIDISM DUE TO ACQUIRED ATROPHY OF THYROID: ICD-10-CM

## 2020-10-21 DIAGNOSIS — E78.5 HYPERLIPIDEMIA WITH TARGET LDL LESS THAN 70: ICD-10-CM

## 2020-10-21 DIAGNOSIS — R26.9 ABNORMALITY OF GAIT AND MOBILITY: ICD-10-CM

## 2020-10-21 DIAGNOSIS — E27.8 ADRENAL NODULE: ICD-10-CM

## 2020-10-21 DIAGNOSIS — Z00.00 ENCOUNTER FOR PREVENTIVE HEALTH EXAMINATION: Primary | ICD-10-CM

## 2020-10-21 DIAGNOSIS — M51.36 DDD (DEGENERATIVE DISC DISEASE), LUMBAR: ICD-10-CM

## 2020-10-21 DIAGNOSIS — I25.10 ATHEROSCLEROSIS OF NATIVE CORONARY ARTERY OF NATIVE HEART WITHOUT ANGINA PECTORIS: ICD-10-CM

## 2020-10-21 DIAGNOSIS — I51.89 DIASTOLIC DYSFUNCTION: ICD-10-CM

## 2020-10-21 DIAGNOSIS — K21.9 GASTROESOPHAGEAL REFLUX DISEASE, UNSPECIFIED WHETHER ESOPHAGITIS PRESENT: ICD-10-CM

## 2020-10-21 DIAGNOSIS — D75.839 THROMBOCYTOSIS: ICD-10-CM

## 2020-10-21 DIAGNOSIS — F41.8 DEPRESSION WITH ANXIETY: ICD-10-CM

## 2020-10-21 DIAGNOSIS — I77.1 TORTUOUS AORTA: ICD-10-CM

## 2020-10-21 DIAGNOSIS — Z99.89 DEPENDENCE ON OTHER ENABLING MACHINES AND DEVICES: ICD-10-CM

## 2020-10-21 PROCEDURE — 99499 UNLISTED E&M SERVICE: CPT | Mod: S$GLB,,, | Performed by: NURSE PRACTITIONER

## 2020-10-21 PROCEDURE — 99499 RISK ADDL DX/OHS AUDIT: ICD-10-PCS | Mod: S$GLB,,, | Performed by: NURSE PRACTITIONER

## 2020-10-21 PROCEDURE — 3075F SYST BP GE 130 - 139MM HG: CPT | Mod: CPTII,S$GLB,, | Performed by: NURSE PRACTITIONER

## 2020-10-21 PROCEDURE — 3078F DIAST BP <80 MM HG: CPT | Mod: CPTII,S$GLB,, | Performed by: NURSE PRACTITIONER

## 2020-10-21 PROCEDURE — 3075F PR MOST RECENT SYSTOLIC BLOOD PRESS GE 130-139MM HG: ICD-10-PCS | Mod: CPTII,S$GLB,, | Performed by: NURSE PRACTITIONER

## 2020-10-21 PROCEDURE — G0439 PR MEDICARE ANNUAL WELLNESS SUBSEQUENT VISIT: ICD-10-PCS | Mod: 95,,, | Performed by: NURSE PRACTITIONER

## 2020-10-21 PROCEDURE — G0439 PPPS, SUBSEQ VISIT: HCPCS | Mod: 95,,, | Performed by: NURSE PRACTITIONER

## 2020-10-21 PROCEDURE — 3078F PR MOST RECENT DIASTOLIC BLOOD PRESSURE < 80 MM HG: ICD-10-PCS | Mod: CPTII,S$GLB,, | Performed by: NURSE PRACTITIONER

## 2020-10-21 NOTE — PATIENT INSTRUCTIONS
Counseling and Referral of Other Preventative  (Italic type indicates deductible and co-insurance are waived)    Patient Name: Jerica Rios  Today's Date: 10/21/2020    Health Maintenance       Date Due Completion Date    Shingles Vaccine (2 of 3) 09/16/2011 7/22/2011    Eye Exam 09/28/2017 9/28/2016    Override on 8/24/2016: Done (Dr Leah Bauer   report sent to scanning   kb)    Override on 3/30/2016: Done ([facility] 20/20 sent scanning)    DEXA SCAN 11/15/2019 11/15/2017    Hemoglobin A1c 06/11/2020 12/11/2019    Influenza Vaccine (1) 08/01/2020 12/11/2019    Foot Exam 12/11/2020 12/11/2019    Override on 9/24/2014: Done (Dr. Hirsch)    Lipid Panel 12/11/2020 12/11/2019    Aspirin/Antiplatelet Therapy 07/09/2021 7/9/2020    TETANUS VACCINE 09/02/2025 9/2/2015        No orders of the defined types were placed in this encounter.    The following information is provided to all patients.  This information is to help you find resources for any of the problems found today that may be affecting your health:                Living healthy guide: www.[facility].[place].gov      Understanding Diabetes: www.diabetes.org      Eating healthy: www.cdc.gov/healthyweight      CDC home safety checklist: www.cdc.gov/steadi/patient.html      Agency on Aging: www.goea.[place].[facility]      Alcoholics anonymous (AA): www.aa.org      Physical Activity: www.matteo.nih.gov/qr8vzyh      Tobacco use: www.quitwithusla.org

## 2020-10-21 NOTE — PROGRESS NOTES
The patient location is: Home  The chief complaint leading to consultation is: Health Risk Assessment     Visit type: audiovisual    Face to Face time with patient: 30  45 minutes of total time spent on the encounter, which includes face to face time and non-face to face time preparing to see the patient (eg, review of tests), Obtaining and/or reviewing separately obtained history, Documenting clinical information in the electronic or other health record, Independently interpreting results (not separately reported) and communicating results to the patient/family/caregiver, or Care coordination (not separately reported).         Each patient to whom he or she provides medical services by telemedicine is:  (1) informed of the relationship between the physician and patient and the respective role of any other health care provider with respect to management of the patient; and (2) notified that he or she may decline to receive medical services by telemedicine and may withdraw from such care at any time.    Notes:       Jerica Rios presented for a  Medicare AWV and comprehensive Health Risk Assessment today. The following components were reviewed and updated:    · Medical history  · Family History  · Social history  · Allergies and Current Medications  · Health Risk Assessment  · Health Maintenance  · Care Team     ** See Completed Assessments for Annual Wellness Visit within the encounter summary.**         The following assessments were completed:  · Living Situation  · CAGE  · Depression Screening  · Fall Risk Assessment (MACH 10)  · Hearing Assessment(HHI)  · Cognitive Function Screening  · Nutrition Screening  · ADL Screening  · PAQ Screening      Vitals:    10/21/20 1202   BP: 135/76   Pulse: 77   Weight: 89.8 kg (198 lb)   Height: 5' (1.524 m)     Body mass index is 38.67 kg/m².  Physical Exam  Constitutional:       Appearance: Normal appearance.   HENT:      Head: Normocephalic and atraumatic.   Neck:       Musculoskeletal: Normal range of motion.   Neurological:      General: No focal deficit present.      Mental Status: She is alert.      Comments: Oriented to person, place, not time.   Psychiatric:         Mood and Affect: Mood normal.         Behavior: Behavior normal.               Diagnoses and health risks identified today and associated recommendations/orders:    1. Encounter for preventive health examination  Assessment completed. Preventive measures reviewed with patient and patients son.    2. Dependence on other enabling machines and devices  Stable, followed by PCP.    3. Abnormality of gait and mobility  Stable, followed by PCP.    4. Dementia without behavioral disturbance, unspecified dementia type  Stable, followed by Neurology.    5. Chronic pain syndrome  Stable, followed by PCP.    6. DDD (degenerative disc disease), lumbar  Stable, followed by PCP.    7. Depression with anxiety  Stable, followed by PCP.    8. Tortuous aorta  Stable, followed by PCP.    9. Atherosclerosis of native coronary artery of native heart without angina pectoris  Stable, followed by PCP.    10. Diastolic dysfunction  Stable, followed by PCP.    11. Hyperlipidemia with target LDL less than 70, baseline LDL not available  Stable, followed by PCP.    12. Hypertension associated with diabetes  Stable, followed by PCP.    13. Thrombocytosis  Stable, followed by PCP.    14. Hypothyroidism due to acquired atrophy of thyroid  Stable, followed by PCP.    15. Obesity, Class I, BMI 30-34.9, today 31.4  Stable, followed by PCP.    16. Thyroid nodule, see CT April 2014  Stable, followed by PCP.    17. Adrenal nodule  Stable, followed by PCP.    18. Controlled type 2 diabetes mellitus without complication, without long-term current use of insulin  Stable, followed by PCP.    19. Gastroesophageal reflux disease, unspecified whether esophagitis present  Stable, followed by PCP.    20. Osteoporosis, unspecified osteoporosis type, unspecified  pathological fracture presence  Stable, followed by PCP.    21. Primary osteoarthritis involving multiple joints  Stable, followed by PCP.    22. DEVONTE (obstructive sleep apnea), not on Rx due to cost, off 2015, diagnosed in 4/2014  Stable, followed by PCP.    Provided Jerica with a 5-10 year written screening schedule and personal prevention plan. Recommendations were developed using the USPSTF age appropriate recommendations. Education, counseling, and referrals were provided as needed. After Visit Summary printed and given to patient which includes a list of additional screenings\tests needed.    No follow-ups on file.    Miladys Woody NP  I offered to discuss end of life issues, including information on how to make advance directives that the patient could use to name someone who would make medical decisions on their behalf if they became too ill to make themselves.    ___Patient declined  _X_Patient is interested, I provided paper work and offered to discuss.

## 2020-10-24 PROBLEM — I77.1 TORTUOUS AORTA: Status: ACTIVE | Noted: 2020-10-24

## 2021-01-24 DIAGNOSIS — E11.9 TYPE 2 DIABETES MELLITUS WITHOUT COMPLICATION: ICD-10-CM

## 2021-01-26 RX ORDER — METFORMIN HYDROCHLORIDE 500 MG/1
TABLET ORAL
Qty: 180 TABLET | Refills: 0 | Status: SHIPPED | OUTPATIENT
Start: 2021-01-26 | End: 2021-06-29

## 2021-03-12 ENCOUNTER — TELEPHONE (OUTPATIENT)
Dept: NEUROLOGY | Facility: CLINIC | Age: 81
End: 2021-03-12

## 2021-06-08 DIAGNOSIS — E03.9 HYPOTHYROIDISM: ICD-10-CM

## 2021-06-10 RX ORDER — LEVOTHYROXINE SODIUM 50 UG/1
TABLET ORAL
Qty: 90 TABLET | Refills: 0 | Status: SHIPPED | OUTPATIENT
Start: 2021-06-10 | End: 2021-09-19

## 2021-06-14 RX ORDER — HYDROCHLOROTHIAZIDE 25 MG/1
TABLET ORAL
Qty: 90 TABLET | Refills: 0 | Status: SHIPPED | OUTPATIENT
Start: 2021-06-14 | End: 2021-09-15

## 2021-06-29 DIAGNOSIS — E11.9 TYPE 2 DIABETES MELLITUS WITHOUT COMPLICATION: ICD-10-CM

## 2021-06-29 RX ORDER — METFORMIN HYDROCHLORIDE 500 MG/1
TABLET ORAL
Qty: 180 TABLET | Refills: 0 | Status: SHIPPED | OUTPATIENT
Start: 2021-06-29 | End: 2021-09-19

## 2021-08-03 ENCOUNTER — TELEPHONE (OUTPATIENT)
Dept: NEUROLOGY | Facility: CLINIC | Age: 81
End: 2021-08-03

## 2021-08-03 ENCOUNTER — PATIENT MESSAGE (OUTPATIENT)
Dept: NEUROLOGY | Facility: CLINIC | Age: 81
End: 2021-08-03

## 2021-08-03 ENCOUNTER — PATIENT OUTREACH (OUTPATIENT)
Dept: ADMINISTRATIVE | Facility: OTHER | Age: 81
End: 2021-08-03

## 2021-08-04 ENCOUNTER — PATIENT MESSAGE (OUTPATIENT)
Dept: ADMINISTRATIVE | Facility: HOSPITAL | Age: 81
End: 2021-08-04

## 2021-08-05 ENCOUNTER — PATIENT MESSAGE (OUTPATIENT)
Dept: NEUROLOGY | Facility: CLINIC | Age: 81
End: 2021-08-05

## 2021-08-06 ENCOUNTER — PATIENT MESSAGE (OUTPATIENT)
Dept: NEUROLOGY | Facility: CLINIC | Age: 81
End: 2021-08-06

## 2021-08-10 ENCOUNTER — PATIENT MESSAGE (OUTPATIENT)
Dept: NEUROLOGY | Facility: CLINIC | Age: 81
End: 2021-08-10

## 2021-08-23 ENCOUNTER — OFFICE VISIT (OUTPATIENT)
Dept: NEUROLOGY | Facility: CLINIC | Age: 81
End: 2021-08-23
Payer: MEDICARE

## 2021-08-23 DIAGNOSIS — F01.50 VASCULAR DEMENTIA WITHOUT BEHAVIORAL DISTURBANCE: Primary | ICD-10-CM

## 2021-08-23 PROCEDURE — 99499 RISK ADDL DX/OHS AUDIT: ICD-10-PCS | Mod: 95,,, | Performed by: NURSE PRACTITIONER

## 2021-08-23 PROCEDURE — 1160F PR REVIEW ALL MEDS BY PRESCRIBER/CLIN PHARMACIST DOCUMENTED: ICD-10-PCS | Mod: CPTII,95,, | Performed by: NURSE PRACTITIONER

## 2021-08-23 PROCEDURE — 99499 UNLISTED E&M SERVICE: CPT | Mod: 95,,, | Performed by: NURSE PRACTITIONER

## 2021-08-23 PROCEDURE — 1159F MED LIST DOCD IN RCRD: CPT | Mod: CPTII,95,, | Performed by: NURSE PRACTITIONER

## 2021-08-23 PROCEDURE — 1157F PR ADVANCE CARE PLAN OR EQUIV PRESENT IN MEDICAL RECORD: ICD-10-PCS | Mod: CPTII,95,, | Performed by: NURSE PRACTITIONER

## 2021-08-23 PROCEDURE — 99213 OFFICE O/P EST LOW 20 MIN: CPT | Mod: 95,,, | Performed by: NURSE PRACTITIONER

## 2021-08-23 PROCEDURE — 1160F RVW MEDS BY RX/DR IN RCRD: CPT | Mod: CPTII,95,, | Performed by: NURSE PRACTITIONER

## 2021-08-23 PROCEDURE — 99213 PR OFFICE/OUTPT VISIT, EST, LEVL III, 20-29 MIN: ICD-10-PCS | Mod: 95,,, | Performed by: NURSE PRACTITIONER

## 2021-08-23 PROCEDURE — 1157F ADVNC CARE PLAN IN RCRD: CPT | Mod: CPTII,95,, | Performed by: NURSE PRACTITIONER

## 2021-08-23 PROCEDURE — 1159F PR MEDICATION LIST DOCUMENTED IN MEDICAL RECORD: ICD-10-PCS | Mod: CPTII,95,, | Performed by: NURSE PRACTITIONER

## 2021-08-23 RX ORDER — RIVASTIGMINE TARTRATE 6 MG/1
6 CAPSULE ORAL 2 TIMES DAILY
Qty: 180 CAPSULE | Refills: 3 | Status: SHIPPED | OUTPATIENT
Start: 2021-08-23 | End: 2021-09-20 | Stop reason: SDUPTHER

## 2021-08-31 ENCOUNTER — HOSPITAL ENCOUNTER (EMERGENCY)
Facility: HOSPITAL | Age: 81
Discharge: HOME | End: 2021-09-01
Attending: EMERGENCY MEDICINE
Payer: MEDICARE

## 2021-08-31 DIAGNOSIS — S01.01XA SCALP LACERATION, INITIAL ENCOUNTER: Primary | ICD-10-CM

## 2021-08-31 PROCEDURE — 99284 EMERGENCY DEPT VISIT MOD MDM: CPT | Mod: 25

## 2021-08-31 PROCEDURE — 12001 RPR S/N/AX/GEN/TRNK 2.5CM/<: CPT

## 2021-09-01 VITALS
DIASTOLIC BLOOD PRESSURE: 78 MMHG | BODY MASS INDEX: 39.27 KG/M2 | OXYGEN SATURATION: 98 % | HEART RATE: 81 BPM | HEIGHT: 60 IN | WEIGHT: 200 LBS | SYSTOLIC BLOOD PRESSURE: 188 MMHG | TEMPERATURE: 98 F | RESPIRATION RATE: 16 BRPM

## 2021-09-01 PROCEDURE — 25000003 PHARM REV CODE 250: Performed by: EMERGENCY MEDICINE

## 2021-09-01 RX ORDER — LIDOCAINE HYDROCHLORIDE 10 MG/ML
10 INJECTION INFILTRATION; PERINEURAL
Status: DISCONTINUED | OUTPATIENT
Start: 2021-09-01 | End: 2021-09-01 | Stop reason: HOSPADM

## 2021-09-01 RX ORDER — ACETAMINOPHEN 500 MG
1000 TABLET ORAL
Status: COMPLETED | OUTPATIENT
Start: 2021-09-01 | End: 2021-09-01

## 2021-09-01 RX ORDER — TRAMADOL HYDROCHLORIDE 50 MG/1
50 TABLET ORAL EVERY 6 HOURS PRN
Qty: 12 TABLET | Refills: 0 | Status: SHIPPED | OUTPATIENT
Start: 2021-09-01 | End: 2022-12-07

## 2021-09-01 RX ADMIN — ACETAMINOPHEN 1000 MG: 500 TABLET ORAL at 02:09

## 2021-09-03 ENCOUNTER — PATIENT MESSAGE (OUTPATIENT)
Dept: NEUROLOGY | Facility: CLINIC | Age: 81
End: 2021-09-03

## 2021-09-05 DIAGNOSIS — F41.8 DEPRESSION WITH ANXIETY: ICD-10-CM

## 2021-09-09 ENCOUNTER — TELEPHONE (OUTPATIENT)
Dept: FAMILY MEDICINE | Facility: CLINIC | Age: 81
End: 2021-09-09

## 2021-09-15 ENCOUNTER — OFFICE VISIT (OUTPATIENT)
Dept: FAMILY MEDICINE | Facility: CLINIC | Age: 81
End: 2021-09-15
Payer: MEDICARE

## 2021-09-15 VITALS
HEIGHT: 60 IN | TEMPERATURE: 98 F | BODY MASS INDEX: 41.03 KG/M2 | WEIGHT: 209 LBS | OXYGEN SATURATION: 96 % | HEART RATE: 75 BPM | DIASTOLIC BLOOD PRESSURE: 68 MMHG | SYSTOLIC BLOOD PRESSURE: 130 MMHG
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
## 2021-09-15 DIAGNOSIS — M81.0 OSTEOPOROSIS, UNSPECIFIED OSTEOPOROSIS TYPE, UNSPECIFIED PATHOLOGICAL FRACTURE PRESENCE: ICD-10-CM
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
## 2021-09-15 PROCEDURE — 1100F PR PT FALLS ASSESS DOC 2+ FALLS/FALL W/INJURY/YR: ICD-10-PCS | Mod: CPTII,S$GLB,,

## 2021-09-15 PROCEDURE — 3075F SYST BP GE 130 - 139MM HG: CPT | Mod: CPTII,S$GLB,,

## 2021-09-15 PROCEDURE — 99999 PR PBB SHADOW E&M-EST. PATIENT-LVL III: CPT | Mod: PBBFAC,,,

## 2021-09-15 PROCEDURE — 1159F MED LIST DOCD IN RCRD: CPT | Mod: CPTII,S$GLB,,

## 2021-09-15 PROCEDURE — 1157F ADVNC CARE PLAN IN RCRD: CPT | Mod: CPTII,S$GLB,,

## 2021-09-15 PROCEDURE — 3078F DIAST BP <80 MM HG: CPT | Mod: CPTII,S$GLB,,

## 2021-09-15 PROCEDURE — 1100F PTFALLS ASSESS-DOCD GE2>/YR: CPT | Mod: CPTII,S$GLB,,

## 2021-09-15 PROCEDURE — 3288F FALL RISK ASSESSMENT DOCD: CPT | Mod: CPTII,S$GLB,,

## 2021-09-15 PROCEDURE — 99999 PR PBB SHADOW E&M-EST. PATIENT-LVL III: ICD-10-PCS | Mod: PBBFAC,,,

## 2021-09-15 PROCEDURE — 3078F PR MOST RECENT DIASTOLIC BLOOD PRESSURE < 80 MM HG: ICD-10-PCS | Mod: CPTII,S$GLB,,

## 2021-09-15 PROCEDURE — 1157F PR ADVANCE CARE PLAN OR EQUIV PRESENT IN MEDICAL RECORD: ICD-10-PCS | Mod: CPTII,S$GLB,,

## 2021-09-15 PROCEDURE — 1159F PR MEDICATION LIST DOCUMENTED IN MEDICAL RECORD: ICD-10-PCS | Mod: CPTII,S$GLB,,

## 2021-09-15 PROCEDURE — 3075F PR MOST RECENT SYSTOLIC BLOOD PRESS GE 130-139MM HG: ICD-10-PCS | Mod: CPTII,S$GLB,,

## 2021-09-15 PROCEDURE — 99213 PR OFFICE/OUTPT VISIT, EST, LEVL III, 20-29 MIN: ICD-10-PCS | Mod: S$GLB,,,

## 2021-09-15 PROCEDURE — 99213 OFFICE O/P EST LOW 20 MIN: CPT | Mod: S$GLB,,,

## 2021-09-15 PROCEDURE — 3288F PR FALLS RISK ASSESSMENT DOCUMENTED: ICD-10-PCS | Mod: CPTII,S$GLB,,

## 2021-09-15 RX ORDER — IBANDRONATE SODIUM 150 MG/1
150 TABLET, FILM COATED ORAL
Qty: 3 TABLET | Refills: 3 | Status: SHIPPED | OUTPATIENT
Start: 2021-09-15 | End: 2021-11-17 | Stop reason: SDUPTHER

## 2021-09-15 RX ORDER — SERTRALINE HYDROCHLORIDE 100 MG/1
TABLET, FILM COATED ORAL
Qty: 30 TABLET | Refills: 0 | Status: SHIPPED | OUTPATIENT
Start: 2021-09-15 | End: 2021-09-19 | Stop reason: SDUPTHER

## 2021-09-19 ENCOUNTER — PATIENT MESSAGE (OUTPATIENT)
Dept: FAMILY MEDICINE | Facility: CLINIC | Age: 81
End: 2021-09-19

## 2021-09-19 ENCOUNTER — PATIENT MESSAGE (OUTPATIENT)
Dept: NEUROLOGY | Facility: CLINIC | Age: 81
End: 2021-09-19

## 2021-09-19 DIAGNOSIS — E11.9 TYPE 2 DIABETES MELLITUS WITHOUT COMPLICATION: ICD-10-CM

## 2021-09-19 DIAGNOSIS — F41.8 DEPRESSION WITH ANXIETY: ICD-10-CM

## 2021-09-19 DIAGNOSIS — E03.9 HYPOTHYROIDISM: ICD-10-CM

## 2021-09-19 RX ORDER — METFORMIN HYDROCHLORIDE 500 MG/1
TABLET ORAL
Qty: 180 TABLET | Refills: 0 | Status: SHIPPED | OUTPATIENT
Start: 2021-09-19 | End: 2021-12-07

## 2021-09-19 RX ORDER — LEVOTHYROXINE SODIUM 50 UG/1
TABLET ORAL
Qty: 90 TABLET | Refills: 0 | Status: SHIPPED | OUTPATIENT
Start: 2021-09-19 | End: 2021-12-07

## 2021-09-20 ENCOUNTER — PATIENT MESSAGE (OUTPATIENT)
Dept: NEUROLOGY | Facility: CLINIC | Age: 81
End: 2021-09-20

## 2021-09-20 DIAGNOSIS — F01.50 VASCULAR DEMENTIA WITHOUT BEHAVIORAL DISTURBANCE: ICD-10-CM

## 2021-09-20 RX ORDER — RIVASTIGMINE TARTRATE 6 MG/1
6 CAPSULE ORAL 2 TIMES DAILY
Qty: 180 CAPSULE | Refills: 3 | Status: SHIPPED | OUTPATIENT
Start: 2021-09-20 | End: 2022-09-20

## 2021-09-20 RX ORDER — SERTRALINE HYDROCHLORIDE 100 MG/1
100 TABLET, FILM COATED ORAL DAILY
Qty: 30 TABLET | Refills: 0 | Status: SHIPPED | OUTPATIENT
Start: 2021-09-20 | End: 2021-11-08

## 2021-10-12 ENCOUNTER — PES CALL (OUTPATIENT)
Dept: ADMINISTRATIVE | Facility: CLINIC | Age: 81
End: 2021-10-12

## 2021-10-18 ENCOUNTER — PATIENT MESSAGE (OUTPATIENT)
Dept: ADMINISTRATIVE | Facility: HOSPITAL | Age: 81
End: 2021-10-18
Payer: MEDICARE

## 2021-11-08 ENCOUNTER — PATIENT MESSAGE (OUTPATIENT)
Dept: FAMILY MEDICINE | Facility: CLINIC | Age: 81
End: 2021-11-08
Payer: MEDICARE

## 2021-11-08 DIAGNOSIS — E78.5 HYPERLIPIDEMIA WITH TARGET LDL LESS THAN 70: ICD-10-CM

## 2021-11-08 DIAGNOSIS — E03.4 HYPOTHYROIDISM DUE TO ACQUIRED ATROPHY OF THYROID: ICD-10-CM

## 2021-11-08 DIAGNOSIS — E11.9 CONTROLLED TYPE 2 DIABETES MELLITUS WITHOUT COMPLICATION, WITHOUT LONG-TERM CURRENT USE OF INSULIN: Primary | ICD-10-CM

## 2021-11-08 DIAGNOSIS — I15.2 HYPERTENSION ASSOCIATED WITH DIABETES: ICD-10-CM

## 2021-11-08 DIAGNOSIS — E11.59 HYPERTENSION ASSOCIATED WITH DIABETES: ICD-10-CM

## 2021-11-15 ENCOUNTER — TELEPHONE (OUTPATIENT)
Dept: ADMINISTRATIVE | Facility: CLINIC | Age: 81
End: 2021-11-15
Payer: MEDICARE

## 2021-11-17 ENCOUNTER — TELEPHONE (OUTPATIENT)
Dept: ADMINISTRATIVE | Facility: CLINIC | Age: 81
End: 2021-11-17
Payer: MEDICARE

## 2021-11-17 ENCOUNTER — OFFICE VISIT (OUTPATIENT)
Dept: FAMILY MEDICINE | Facility: CLINIC | Age: 81
End: 2021-11-17
Payer: MEDICARE

## 2021-11-17 DIAGNOSIS — E03.4 HYPOTHYROIDISM DUE TO ACQUIRED ATROPHY OF THYROID: ICD-10-CM

## 2021-11-17 DIAGNOSIS — F41.8 DEPRESSION WITH ANXIETY: ICD-10-CM

## 2021-11-17 DIAGNOSIS — F33.1 MODERATE EPISODE OF RECURRENT MAJOR DEPRESSIVE DISORDER: ICD-10-CM

## 2021-11-17 DIAGNOSIS — R26.9 ABNORMALITY OF GAIT AND MOBILITY: ICD-10-CM

## 2021-11-17 DIAGNOSIS — E11.21 CONTROLLED TYPE 2 DIABETES MELLITUS WITH DIABETIC NEPHROPATHY, WITHOUT LONG-TERM CURRENT USE OF INSULIN: ICD-10-CM

## 2021-11-17 DIAGNOSIS — E11.59 HYPERTENSION ASSOCIATED WITH DIABETES: ICD-10-CM

## 2021-11-17 DIAGNOSIS — I25.10 ATHEROSCLEROSIS OF NATIVE CORONARY ARTERY OF NATIVE HEART WITHOUT ANGINA PECTORIS: ICD-10-CM

## 2021-11-17 DIAGNOSIS — Z91.89 SEDENTARY LIFESTYLE: ICD-10-CM

## 2021-11-17 DIAGNOSIS — I77.1 TORTUOUS AORTA: ICD-10-CM

## 2021-11-17 DIAGNOSIS — E78.5 HYPERLIPIDEMIA WITH TARGET LDL LESS THAN 70: ICD-10-CM

## 2021-11-17 DIAGNOSIS — Z00.00 ENCOUNTER FOR PREVENTIVE HEALTH EXAMINATION: Primary | ICD-10-CM

## 2021-11-17 DIAGNOSIS — I15.2 HYPERTENSION ASSOCIATED WITH DIABETES: ICD-10-CM

## 2021-11-17 DIAGNOSIS — F03.90 DEMENTIA WITHOUT BEHAVIORAL DISTURBANCE, UNSPECIFIED DEMENTIA TYPE: ICD-10-CM

## 2021-11-17 DIAGNOSIS — Z99.89 DEPENDENCE ON OTHER ENABLING MACHINES AND DEVICES: ICD-10-CM

## 2021-11-17 DIAGNOSIS — M81.0 OSTEOPOROSIS, UNSPECIFIED OSTEOPOROSIS TYPE, UNSPECIFIED PATHOLOGICAL FRACTURE PRESENCE: ICD-10-CM

## 2021-11-17 PROCEDURE — 1157F ADVNC CARE PLAN IN RCRD: CPT | Mod: CPTII,S$GLB,, | Performed by: NURSE PRACTITIONER

## 2021-11-17 PROCEDURE — 1101F PR PT FALLS ASSESS DOC 0-1 FALLS W/OUT INJ PAST YR: ICD-10-PCS | Mod: CPTII,S$GLB,, | Performed by: NURSE PRACTITIONER

## 2021-11-17 PROCEDURE — 99499 RISK ADDL DX/OHS AUDIT: ICD-10-PCS | Mod: S$GLB,,, | Performed by: NURSE PRACTITIONER

## 2021-11-17 PROCEDURE — 1170F FXNL STATUS ASSESSED: CPT | Mod: CPTII,S$GLB,, | Performed by: NURSE PRACTITIONER

## 2021-11-17 PROCEDURE — 1170F PR FUNCTIONAL STATUS ASSESSED: ICD-10-PCS | Mod: CPTII,S$GLB,, | Performed by: NURSE PRACTITIONER

## 2021-11-17 PROCEDURE — 1160F PR REVIEW ALL MEDS BY PRESCRIBER/CLIN PHARMACIST DOCUMENTED: ICD-10-PCS | Mod: CPTII,S$GLB,, | Performed by: NURSE PRACTITIONER

## 2021-11-17 PROCEDURE — 1101F PT FALLS ASSESS-DOCD LE1/YR: CPT | Mod: CPTII,S$GLB,, | Performed by: NURSE PRACTITIONER

## 2021-11-17 PROCEDURE — 1159F PR MEDICATION LIST DOCUMENTED IN MEDICAL RECORD: ICD-10-PCS | Mod: CPTII,S$GLB,, | Performed by: NURSE PRACTITIONER

## 2021-11-17 PROCEDURE — 1160F RVW MEDS BY RX/DR IN RCRD: CPT | Mod: CPTII,S$GLB,, | Performed by: NURSE PRACTITIONER

## 2021-11-17 PROCEDURE — 99499 UNLISTED E&M SERVICE: CPT | Mod: S$GLB,,, | Performed by: NURSE PRACTITIONER

## 2021-11-17 PROCEDURE — 1159F MED LIST DOCD IN RCRD: CPT | Mod: CPTII,S$GLB,, | Performed by: NURSE PRACTITIONER

## 2021-11-17 PROCEDURE — 1157F PR ADVANCE CARE PLAN OR EQUIV PRESENT IN MEDICAL RECORD: ICD-10-PCS | Mod: CPTII,S$GLB,, | Performed by: NURSE PRACTITIONER

## 2021-11-17 PROCEDURE — G0439 PPPS, SUBSEQ VISIT: HCPCS | Mod: 95,,, | Performed by: NURSE PRACTITIONER

## 2021-11-17 PROCEDURE — 3288F FALL RISK ASSESSMENT DOCD: CPT | Mod: CPTII,S$GLB,, | Performed by: NURSE PRACTITIONER

## 2021-11-17 PROCEDURE — G0439 PR MEDICARE ANNUAL WELLNESS SUBSEQUENT VISIT: ICD-10-PCS | Mod: 95,,, | Performed by: NURSE PRACTITIONER

## 2021-11-17 PROCEDURE — 3288F PR FALLS RISK ASSESSMENT DOCUMENTED: ICD-10-PCS | Mod: CPTII,S$GLB,, | Performed by: NURSE PRACTITIONER

## 2021-11-17 RX ORDER — IBANDRONATE SODIUM 150 MG/1
150 TABLET, FILM COATED ORAL
Qty: 3 TABLET | Refills: 3 | Status: SHIPPED | OUTPATIENT
Start: 2021-11-17 | End: 2022-12-07 | Stop reason: SDUPTHER

## 2021-12-05 DIAGNOSIS — I15.2 HYPERTENSION ASSOCIATED WITH DIABETES: Primary | ICD-10-CM

## 2021-12-05 DIAGNOSIS — E11.59 HYPERTENSION ASSOCIATED WITH DIABETES: Primary | ICD-10-CM

## 2021-12-06 RX ORDER — AMLODIPINE BESYLATE 10 MG/1
TABLET ORAL
Qty: 90 TABLET | Refills: 3 | Status: SHIPPED | OUTPATIENT
Start: 2021-12-06 | End: 2021-12-30 | Stop reason: SDUPTHER

## 2021-12-08 ENCOUNTER — TELEPHONE (OUTPATIENT)
Dept: FAMILY MEDICINE | Facility: CLINIC | Age: 81
End: 2021-12-08

## 2021-12-08 ENCOUNTER — PATIENT MESSAGE (OUTPATIENT)
Dept: FAMILY MEDICINE | Facility: CLINIC | Age: 81
End: 2021-12-08

## 2021-12-08 ENCOUNTER — OFFICE VISIT (OUTPATIENT)
Dept: FAMILY MEDICINE | Facility: CLINIC | Age: 81
End: 2021-12-08
Payer: MEDICARE

## 2021-12-08 ENCOUNTER — LAB VISIT (OUTPATIENT)
Dept: LAB | Facility: HOSPITAL | Age: 81
End: 2021-12-08
Attending: NURSE PRACTITIONER
Payer: MEDICARE

## 2021-12-08 VITALS
HEART RATE: 69 BPM | TEMPERATURE: 99 F | BODY MASS INDEX: 42.85 KG/M2 | HEIGHT: 60 IN | OXYGEN SATURATION: 96 % | WEIGHT: 218.25 LBS | DIASTOLIC BLOOD PRESSURE: 70 MMHG | SYSTOLIC BLOOD PRESSURE: 120 MMHG

## 2021-12-08 DIAGNOSIS — E11.59 HYPERTENSION ASSOCIATED WITH DIABETES: ICD-10-CM

## 2021-12-08 DIAGNOSIS — M81.0 OSTEOPOROSIS, UNSPECIFIED OSTEOPOROSIS TYPE, UNSPECIFIED PATHOLOGICAL FRACTURE PRESENCE: ICD-10-CM

## 2021-12-08 DIAGNOSIS — E78.5 HYPERLIPIDEMIA WITH TARGET LDL LESS THAN 70: ICD-10-CM

## 2021-12-08 DIAGNOSIS — F03.90 DEMENTIA WITHOUT BEHAVIORAL DISTURBANCE, UNSPECIFIED DEMENTIA TYPE: ICD-10-CM

## 2021-12-08 DIAGNOSIS — Z01.00 DIABETIC EYE EXAM: ICD-10-CM

## 2021-12-08 DIAGNOSIS — E11.65 TYPE 2 DIABETES MELLITUS WITH HYPERGLYCEMIA, WITHOUT LONG-TERM CURRENT USE OF INSULIN: ICD-10-CM

## 2021-12-08 DIAGNOSIS — I15.2 HYPERTENSION ASSOCIATED WITH DIABETES: ICD-10-CM

## 2021-12-08 DIAGNOSIS — E11.9 CONTROLLED TYPE 2 DIABETES MELLITUS WITHOUT COMPLICATION, WITHOUT LONG-TERM CURRENT USE OF INSULIN: ICD-10-CM

## 2021-12-08 DIAGNOSIS — E66.01 MORBID (SEVERE) OBESITY DUE TO EXCESS CALORIES: ICD-10-CM

## 2021-12-08 DIAGNOSIS — E03.4 HYPOTHYROIDISM DUE TO ACQUIRED ATROPHY OF THYROID: ICD-10-CM

## 2021-12-08 DIAGNOSIS — E11.59 HYPERTENSION ASSOCIATED WITH DIABETES: Primary | ICD-10-CM

## 2021-12-08 DIAGNOSIS — E11.9 DIABETIC EYE EXAM: ICD-10-CM

## 2021-12-08 DIAGNOSIS — Z23 FLU VACCINE NEED: ICD-10-CM

## 2021-12-08 DIAGNOSIS — I15.2 HYPERTENSION ASSOCIATED WITH DIABETES: Primary | ICD-10-CM

## 2021-12-08 LAB
ALBUMIN SERPL BCP-MCNC: 4.3 G/DL (ref 3.5–5.2)
ALP SERPL-CCNC: 124 U/L (ref 55–135)
ALT SERPL W/O P-5'-P-CCNC: 26 U/L (ref 10–44)
ANION GAP SERPL CALC-SCNC: 12 MMOL/L (ref 8–16)
AST SERPL-CCNC: 19 U/L (ref 10–40)
BASOPHILS # BLD AUTO: 0.06 K/UL (ref 0–0.2)
BASOPHILS NFR BLD: 0.6 % (ref 0–1.9)
BILIRUB SERPL-MCNC: 0.5 MG/DL (ref 0.1–1)
BUN SERPL-MCNC: 15 MG/DL (ref 8–23)
CALCIUM SERPL-MCNC: 10 MG/DL (ref 8.7–10.5)
CHLORIDE SERPL-SCNC: 102 MMOL/L (ref 95–110)
CHOLEST SERPL-MCNC: 122 MG/DL (ref 120–199)
CHOLEST/HDLC SERPL: 3 {RATIO} (ref 2–5)
CO2 SERPL-SCNC: 24 MMOL/L (ref 23–29)
CREAT SERPL-MCNC: 1.1 MG/DL (ref 0.5–1.4)
DIFFERENTIAL METHOD: ABNORMAL
EOSINOPHIL # BLD AUTO: 0.4 K/UL (ref 0–0.5)
EOSINOPHIL NFR BLD: 3.5 % (ref 0–8)
ERYTHROCYTE [DISTWIDTH] IN BLOOD BY AUTOMATED COUNT: 15.9 % (ref 11.5–14.5)
EST. GFR  (AFRICAN AMERICAN): 54.4 ML/MIN/1.73 M^2
EST. GFR  (NON AFRICAN AMERICAN): 47.2 ML/MIN/1.73 M^2
ESTIMATED AVG GLUCOSE: 117 MG/DL (ref 68–131)
GLUCOSE SERPL-MCNC: 99 MG/DL (ref 70–110)
HBA1C MFR BLD: 5.7 % (ref 4–5.6)
HCT VFR BLD AUTO: 37.1 % (ref 37–48.5)
HDLC SERPL-MCNC: 41 MG/DL (ref 40–75)
HDLC SERPL: 33.6 % (ref 20–50)
HGB BLD-MCNC: 12 G/DL (ref 12–16)
IMM GRANULOCYTES # BLD AUTO: 0.03 K/UL (ref 0–0.04)
IMM GRANULOCYTES NFR BLD AUTO: 0.3 % (ref 0–0.5)
LDLC SERPL CALC-MCNC: 60.6 MG/DL (ref 63–159)
LYMPHOCYTES # BLD AUTO: 1.8 K/UL (ref 1–4.8)
LYMPHOCYTES NFR BLD: 17.2 % (ref 18–48)
MCH RBC QN AUTO: 28.6 PG (ref 27–31)
MCHC RBC AUTO-ENTMCNC: 32.3 G/DL (ref 32–36)
MCV RBC AUTO: 88 FL (ref 82–98)
MONOCYTES # BLD AUTO: 0.8 K/UL (ref 0.3–1)
MONOCYTES NFR BLD: 7.4 % (ref 4–15)
NEUTROPHILS # BLD AUTO: 7.5 K/UL (ref 1.8–7.7)
NEUTROPHILS NFR BLD: 71 % (ref 38–73)
NONHDLC SERPL-MCNC: 81 MG/DL
NRBC BLD-RTO: 0 /100 WBC
PLATELET # BLD AUTO: 403 K/UL (ref 150–450)
PMV BLD AUTO: 10.9 FL (ref 9.2–12.9)
POTASSIUM SERPL-SCNC: 4.1 MMOL/L (ref 3.5–5.1)
PROT SERPL-MCNC: 7.5 G/DL (ref 6–8.4)
RBC # BLD AUTO: 4.2 M/UL (ref 4–5.4)
SODIUM SERPL-SCNC: 138 MMOL/L (ref 136–145)
T4 FREE SERPL-MCNC: 0.98 NG/DL (ref 0.71–1.51)
TRIGL SERPL-MCNC: 102 MG/DL (ref 30–150)
TSH SERPL DL<=0.005 MIU/L-ACNC: 2.63 UIU/ML (ref 0.4–4)
WBC # BLD AUTO: 10.61 K/UL (ref 3.9–12.7)

## 2021-12-08 PROCEDURE — 99499 RISK ADDL DX/OHS AUDIT: ICD-10-PCS | Mod: S$GLB,,, | Performed by: NURSE PRACTITIONER

## 2021-12-08 PROCEDURE — 80061 LIPID PANEL: CPT | Performed by: NURSE PRACTITIONER

## 2021-12-08 PROCEDURE — 99214 OFFICE O/P EST MOD 30 MIN: CPT | Mod: 25,S$GLB,, | Performed by: NURSE PRACTITIONER

## 2021-12-08 PROCEDURE — 1157F ADVNC CARE PLAN IN RCRD: CPT | Mod: CPTII,S$GLB,, | Performed by: NURSE PRACTITIONER

## 2021-12-08 PROCEDURE — 83036 HEMOGLOBIN GLYCOSYLATED A1C: CPT | Performed by: NURSE PRACTITIONER

## 2021-12-08 PROCEDURE — 90694 FLU VACCINE - QUADRIVALENT - ADJUVANTED: ICD-10-PCS | Mod: S$GLB,,, | Performed by: NURSE PRACTITIONER

## 2021-12-08 PROCEDURE — 90694 VACC AIIV4 NO PRSRV 0.5ML IM: CPT | Mod: S$GLB,,, | Performed by: NURSE PRACTITIONER

## 2021-12-08 PROCEDURE — 99214 PR OFFICE/OUTPT VISIT, EST, LEVL IV, 30-39 MIN: ICD-10-PCS | Mod: 25,S$GLB,, | Performed by: NURSE PRACTITIONER

## 2021-12-08 PROCEDURE — 99999 PR PBB SHADOW E&M-EST. PATIENT-LVL V: ICD-10-PCS | Mod: PBBFAC,,, | Performed by: NURSE PRACTITIONER

## 2021-12-08 PROCEDURE — 84443 ASSAY THYROID STIM HORMONE: CPT | Performed by: NURSE PRACTITIONER

## 2021-12-08 PROCEDURE — 85025 COMPLETE CBC W/AUTO DIFF WBC: CPT | Performed by: NURSE PRACTITIONER

## 2021-12-08 PROCEDURE — 1157F PR ADVANCE CARE PLAN OR EQUIV PRESENT IN MEDICAL RECORD: ICD-10-PCS | Mod: CPTII,S$GLB,, | Performed by: NURSE PRACTITIONER

## 2021-12-08 PROCEDURE — 36415 COLL VENOUS BLD VENIPUNCTURE: CPT | Mod: PO | Performed by: NURSE PRACTITIONER

## 2021-12-08 PROCEDURE — G0008 ADMIN INFLUENZA VIRUS VAC: HCPCS | Mod: S$GLB,,, | Performed by: NURSE PRACTITIONER

## 2021-12-08 PROCEDURE — 99999 PR PBB SHADOW E&M-EST. PATIENT-LVL V: CPT | Mod: PBBFAC,,, | Performed by: NURSE PRACTITIONER

## 2021-12-08 PROCEDURE — G0008 FLU VACCINE - QUADRIVALENT - ADJUVANTED: ICD-10-PCS | Mod: S$GLB,,, | Performed by: NURSE PRACTITIONER

## 2021-12-08 PROCEDURE — 99499 UNLISTED E&M SERVICE: CPT | Mod: S$GLB,,, | Performed by: NURSE PRACTITIONER

## 2021-12-08 PROCEDURE — 84439 ASSAY OF FREE THYROXINE: CPT | Performed by: NURSE PRACTITIONER

## 2021-12-08 PROCEDURE — 80053 COMPREHEN METABOLIC PANEL: CPT | Performed by: NURSE PRACTITIONER

## 2021-12-08 RX ORDER — RIVASTIGMINE TARTRATE 4.5 MG/1
4.5 CAPSULE ORAL 2 TIMES DAILY
COMMUNITY
Start: 2021-09-09 | End: 2021-12-27

## 2021-12-09 ENCOUNTER — TELEPHONE (OUTPATIENT)
Dept: FAMILY MEDICINE | Facility: CLINIC | Age: 81
End: 2021-12-09
Payer: MEDICARE

## 2021-12-27 DIAGNOSIS — E11.9 TYPE 2 DIABETES MELLITUS WITHOUT COMPLICATION: ICD-10-CM

## 2021-12-27 DIAGNOSIS — E03.9 HYPOTHYROIDISM: ICD-10-CM

## 2021-12-27 NOTE — TELEPHONE ENCOUNTER
Care Due:                  Date            Visit Type   Department     Provider  --------------------------------------------------------------------------------    Last Visit: None Found      None         None Found  Next Visit: None Scheduled  None         None Found                                                            Last  Test          Frequency    Reason                     Performed    Due Date  --------------------------------------------------------------------------------    Office Visit  12 months..  atorvastatin,              Not Found    Overdue                             hydroCHLOROthiazide,                             irbesartan, metFORMIN,                             potassium................    Powered by [employer]. Reference number: [id number].   12/27/2021 4:22:17 PM CST

## 2022-01-01 RX ORDER — LEVOTHYROXINE SODIUM 50 UG/1
TABLET ORAL
Qty: 90 TABLET | Refills: 0 | Status: SHIPPED | OUTPATIENT
Start: 2022-01-01 | End: 2022-05-31

## 2022-01-01 RX ORDER — IRBESARTAN 300 MG/1
TABLET ORAL
Qty: 90 TABLET | Refills: 0 | Status: SHIPPED | OUTPATIENT
Start: 2022-01-01 | End: 2022-05-31

## 2022-01-01 RX ORDER — HYDROCHLOROTHIAZIDE 25 MG/1
TABLET ORAL
Qty: 90 TABLET | Refills: 0 | Status: SHIPPED | OUTPATIENT
Start: 2022-01-01 | End: 2022-05-23

## 2022-01-01 RX ORDER — METFORMIN HYDROCHLORIDE 500 MG/1
TABLET ORAL
Qty: 180 TABLET | Refills: 0 | Status: SHIPPED | OUTPATIENT
Start: 2022-01-01 | End: 2022-05-23

## 2022-01-01 RX ORDER — ATORVASTATIN CALCIUM 80 MG/1
TABLET, FILM COATED ORAL
Qty: 90 TABLET | Refills: 0 | Status: SHIPPED | OUTPATIENT
Start: 2022-01-01 | End: 2022-05-23

## 2022-02-16 ENCOUNTER — PES CALL (OUTPATIENT)
Dept: ADMINISTRATIVE | Facility: CLINIC | Age: 82
End: 2022-02-16
Payer: MEDICARE

## 2022-02-17 RX ORDER — POTASSIUM CHLORIDE 750 MG/1
10 CAPSULE, EXTENDED RELEASE ORAL DAILY
Qty: 30 CAPSULE | Refills: 5 | OUTPATIENT
Start: 2022-02-17 | End: 2022-03-31

## 2022-02-17 NOTE — TELEPHONE ENCOUNTER
No new care gaps identified.  Powered by [employer]. Reference number: [id number].   2/17/2022 10:03:47 AM CST

## 2022-03-31 ENCOUNTER — HOSPITAL ENCOUNTER (EMERGENCY)
Facility: HOSPITAL | Age: 82
Discharge: HOME | End: 2022-03-31
Attending: EMERGENCY MEDICINE
Payer: MEDICARE

## 2022-03-31 VITALS
DIASTOLIC BLOOD PRESSURE: 94 MMHG | RESPIRATION RATE: 18 BRPM | HEIGHT: 60 IN | SYSTOLIC BLOOD PRESSURE: 150 MMHG | HEART RATE: 77 BPM | TEMPERATURE: 99 F | WEIGHT: 215 LBS | OXYGEN SATURATION: 98 % | BODY MASS INDEX: 42.21 KG/M2

## 2022-03-31 DIAGNOSIS — S01.01XA LACERATION OF SCALP, INITIAL ENCOUNTER: Primary | ICD-10-CM

## 2022-03-31 DIAGNOSIS — E87.6 HYPOKALEMIA: ICD-10-CM

## 2022-03-31 DIAGNOSIS — S09.90XA HEAD INJURY: ICD-10-CM

## 2022-03-31 DIAGNOSIS — S00.03XA CONTUSION OF SCALP, INITIAL ENCOUNTER: ICD-10-CM

## 2022-03-31 LAB
ANION GAP SERPL CALC-SCNC: 14 MMOL/L (ref 8–16)
BACTERIA #/AREA URNS HPF: ABNORMAL /HPF
BASOPHILS # BLD AUTO: 0.07 K/UL (ref 0–0.2)
BASOPHILS NFR BLD: 0.4 % (ref 0–1.9)
BILIRUB UR QL STRIP: NEGATIVE
BUN SERPL-MCNC: 12 MG/DL (ref 8–23)
CALCIUM SERPL-MCNC: 10.1 MG/DL (ref 8.7–10.5)
CHLORIDE SERPL-SCNC: 104 MMOL/L (ref 95–110)
CLARITY UR: ABNORMAL
CO2 SERPL-SCNC: 22 MMOL/L (ref 23–29)
COLOR UR: YELLOW
CREAT SERPL-MCNC: 1 MG/DL (ref 0.5–1.4)
DIFFERENTIAL METHOD: ABNORMAL
EOSINOPHIL # BLD AUTO: 0.2 K/UL (ref 0–0.5)
EOSINOPHIL NFR BLD: 1 % (ref 0–8)
ERYTHROCYTE [DISTWIDTH] IN BLOOD BY AUTOMATED COUNT: 15.3 % (ref 11.5–14.5)
EST. GFR  (AFRICAN AMERICAN): >60 ML/MIN/1.73 M^2
EST. GFR  (NON AFRICAN AMERICAN): 53 ML/MIN/1.73 M^2
GLUCOSE SERPL-MCNC: 123 MG/DL (ref 70–110)
GLUCOSE UR QL STRIP: NEGATIVE
HCT VFR BLD AUTO: 38.3 % (ref 37–48.5)
HGB BLD-MCNC: 12.2 G/DL (ref 12–16)
HGB UR QL STRIP: ABNORMAL
IMM GRANULOCYTES # BLD AUTO: 0.07 K/UL (ref 0–0.04)
IMM GRANULOCYTES NFR BLD AUTO: 0.4 % (ref 0–0.5)
KETONES UR QL STRIP: NEGATIVE
LEUKOCYTE ESTERASE UR QL STRIP: ABNORMAL
LYMPHOCYTES # BLD AUTO: 2.4 K/UL (ref 1–4.8)
LYMPHOCYTES NFR BLD: 13.6 % (ref 18–48)
MCH RBC QN AUTO: 27.5 PG (ref 27–31)
MCHC RBC AUTO-ENTMCNC: 31.9 G/DL (ref 32–36)
MCV RBC AUTO: 87 FL (ref 82–98)
MICROSCOPIC COMMENT: ABNORMAL
MONOCYTES # BLD AUTO: 1.1 K/UL (ref 0.3–1)
MONOCYTES NFR BLD: 6.3 % (ref 4–15)
NEUTROPHILS # BLD AUTO: 13.6 K/UL (ref 1.8–7.7)
NEUTROPHILS NFR BLD: 78.3 % (ref 38–73)
NITRITE UR QL STRIP: POSITIVE
NRBC BLD-RTO: 0 /100 WBC
PH UR STRIP: 7 [PH] (ref 5–8)
PLATELET # BLD AUTO: 389 K/UL (ref 150–450)
PMV BLD AUTO: 10.2 FL (ref 9.2–12.9)
POTASSIUM SERPL-SCNC: 3.2 MMOL/L (ref 3.5–5.1)
PROT UR QL STRIP: NEGATIVE
RBC # BLD AUTO: 4.43 M/UL (ref 4–5.4)
RBC #/AREA URNS HPF: 6 /HPF (ref 0–4)
SODIUM SERPL-SCNC: 140 MMOL/L (ref 136–145)
SP GR UR STRIP: 1.01 (ref 1–1.03)
SQUAMOUS #/AREA URNS HPF: 2 /HPF
URN SPEC COLLECT METH UR: ABNORMAL
UROBILINOGEN UR STRIP-ACNC: NEGATIVE EU/DL
WBC # BLD AUTO: 17.39 K/UL (ref 3.9–12.7)
WBC #/AREA URNS HPF: 1 /HPF (ref 0–5)

## 2022-03-31 PROCEDURE — 81000 URINALYSIS NONAUTO W/SCOPE: CPT | Performed by: EMERGENCY MEDICINE

## 2022-03-31 PROCEDURE — 36415 COLL VENOUS BLD VENIPUNCTURE: CPT | Performed by: EMERGENCY MEDICINE

## 2022-03-31 PROCEDURE — 85025 COMPLETE CBC W/AUTO DIFF WBC: CPT | Performed by: EMERGENCY MEDICINE

## 2022-03-31 PROCEDURE — 25000003 PHARM REV CODE 250: Performed by: EMERGENCY MEDICINE

## 2022-03-31 PROCEDURE — 80048 BASIC METABOLIC PNL TOTAL CA: CPT | Performed by: EMERGENCY MEDICINE

## 2022-03-31 PROCEDURE — 12001 RPR S/N/AX/GEN/TRNK 2.5CM/<: CPT

## 2022-03-31 PROCEDURE — 99284 EMERGENCY DEPT VISIT MOD MDM: CPT | Mod: 25

## 2022-03-31 RX ORDER — POTASSIUM CHLORIDE 20 MEQ/1
40 TABLET, EXTENDED RELEASE ORAL
Status: COMPLETED | OUTPATIENT
Start: 2022-03-31 | End: 2022-03-31

## 2022-03-31 RX ORDER — POTASSIUM CHLORIDE 20 MEQ/1
20 TABLET, EXTENDED RELEASE ORAL 2 TIMES DAILY
Qty: 30 TABLET | Refills: 0 | Status: SHIPPED | OUTPATIENT
Start: 2022-03-31 | End: 2022-04-05

## 2022-03-31 RX ORDER — ACETAMINOPHEN 325 MG/1
650 TABLET ORAL
Status: COMPLETED | OUTPATIENT
Start: 2022-03-31 | End: 2022-03-31

## 2022-03-31 RX ORDER — LIDOCAINE HCL/EPINEPHRINE/PF 2%-1:200K
10 VIAL (ML) INJECTION ONCE
Status: COMPLETED | OUTPATIENT
Start: 2022-03-31 | End: 2022-03-31

## 2022-03-31 RX ADMIN — ACETAMINOPHEN 650 MG: 325 TABLET ORAL at 07:03

## 2022-03-31 RX ADMIN — POTASSIUM CHLORIDE 40 MEQ: 1500 TABLET, EXTENDED RELEASE ORAL at 06:03

## 2022-03-31 RX ADMIN — LIDOCAINE HYDROCHLORIDE,EPINEPHRINE BITARTRATE 10 ML: 20; .005 INJECTION, SOLUTION EPIDURAL; INFILTRATION; INTRACAUDAL; PERINEURAL at 06:03

## 2022-03-31 NOTE — ED NOTES
Patient is alert & oriented x3, vital signs stable, came in for laceration to head from a fall that required staples, went to head CT and awaiting results, needs assessed, safety sweep done, will continue to monitor.

## 2022-03-31 NOTE — ED PROVIDER NOTES
Encounter Date: 3/31/2022       History     Chief Complaint   Patient presents with    Fall     Patient fell when getting out of bed. Laceration to left forehead. Patient is on blood thinners     Chief complaint:  Head injury    HPI:  82-year-old female presents with a laceration to the left scalp after she awakened, lost her balance and hit her head on a nightstand.  She denies any chest pain, palpitations or shortness of breath.  She has no loss of consciousness or neck pain.  She is chronically treated with Plavix.  Past medical history is significant for coronary artery disease, peripheral vascular disease, and hypertension.        Review of patient's allergies indicates:   Allergen Reactions    Amoxicillin-pot clavulanate      Other reaction(s): CONSTIPATION    Hydrocodone-acetaminophen Other (See Comments)     Other reaction(s): irritable  Other reaction(s): aggressive    Meperidine Other (See Comments)     Other reaction(s): irritability  Other reaction(s): aggressiveness    Propoxyphene n-acetaminophen      Other reaction(s): irritable  Other reaction(s): aggressive    Sulfa (sulfonamide antibiotics)      Other reaction(s): Rash     Past Medical History:   Diagnosis Date    Allergy     Arthritis     Carotid artery disease     Coronary artery disease     Diabetes mellitus     Diabetes mellitus, type 2     Fall at home     GERD (gastroesophageal reflux disease)     Headache(784.0)     Hyperlipidemia     Hypertension     Mental disorder     Obesity     Right rib fracture     Snoring     Toe fracture, right      Past Surgical History:   Procedure Laterality Date    CHOLECYSTECTOMY      CORONARY STENT PLACEMENT      x 5    CYST REMOVAL      sebaceous cyst from back    EYE SURGERY      OVARIAN CYST REMOVAL      SINUS SURGERY      TONSILLECTOMY       Family History   Problem Relation Age of Onset    Heart disease Mother     Diabetes Mother     Cancer Mother         breast cancer     Heart disease Father     Heart disease Sister     Stroke Brother     Dementia Brother     Heart disease Brother      Social History     Tobacco Use    Smoking status: Never Smoker    Smokeless tobacco: Never Used   Substance Use Topics    Alcohol use: No    Drug use: No     Review of Systems   Constitutional: Negative for activity change, appetite change, chills, fatigue and fever.   Eyes: Negative for visual disturbance.   Respiratory: Negative for apnea and shortness of breath.    Cardiovascular: Negative for chest pain and palpitations.   Gastrointestinal: Negative for abdominal distention and abdominal pain.   Genitourinary: Negative for difficulty urinating.   Musculoskeletal: Negative for neck pain.   Skin: Negative for pallor and rash.   Neurological: Negative for headaches.   Hematological: Does not bruise/bleed easily.   Psychiatric/Behavioral: Negative for agitation.       Physical Exam     Initial Vitals [03/31/22 0453]   BP Pulse Resp Temp SpO2   (!) 156/97 96 18 98.7 °F (37.1 °C) 98 %      MAP       --         Physical Exam    Nursing note and vitals reviewed.  Constitutional: She appears well-developed and well-nourished.   HENT:   Head: Normocephalic.   2.4 cm laceration of the left temporal scalp with no active bleeding   Eyes: Conjunctivae are normal.   Neck: Neck supple.   Normal range of motion.  Cardiovascular: Normal rate, regular rhythm and normal heart sounds. Exam reveals no gallop and no friction rub.    No murmur heard.  Pulmonary/Chest: Effort normal and breath sounds normal. No respiratory distress. She has no wheezes. She has no rhonchi. She has no rales.   Abdominal: Abdomen is soft. She exhibits no distension. There is no abdominal tenderness.   Musculoskeletal:         General: Normal range of motion.      Cervical back: Normal range of motion and neck supple.     Neurological: She is alert and oriented to person, place, and time. She has normal strength. No sensory deficit.  GCS score is 15. GCS eye subscore is 4. GCS verbal subscore is 5. GCS motor subscore is 6.   Skin: Skin is warm and dry. No erythema.   Psychiatric: She has a normal mood and affect.         ED Course   Lac Repair    Date/Time: 3/31/2022 5:34 AM  Performed by: Bear Robles III, MD  Authorized by: Bear Robles III, MD     Anesthesia:     Anesthesia method:  Local infiltration    Local anesthetic:  Lidocaine 1% w/o epi  Laceration details:     Location:  Scalp    Scalp location:  L temporal    Length (cm):  2.4  Exploration:     Hemostasis achieved with:  Direct pressure    Imaging outcome: foreign body not noted      Wound exploration: entire depth of wound visualized    Treatment:     Area cleansed with:  Povidone-iodine    Amount of cleaning:  Standard    Debridement:  None    Undermining:  None  Skin repair:     Repair method:  Staples    Number of staples:  5  Approximation:     Approximation:  Close  Repair type:     Repair type:  Simple  Post-procedure details:     Dressing:  Open (no dressing)      Labs Reviewed   BASIC METABOLIC PANEL - Abnormal; Notable for the following components:       Result Value    Potassium 3.2 (*)     CO2 22 (*)     Glucose 123 (*)     eGFR if non  53 (*)     All other components within normal limits   CBC W/ AUTO DIFFERENTIAL - Abnormal; Notable for the following components:    WBC 17.39 (*)     MCHC 31.9 (*)     RDW 15.3 (*)     Gran # (ANC) 13.6 (*)     Immature Grans (Abs) 0.07 (*)     Mono # 1.1 (*)     Gran % 78.3 (*)     Lymph % 13.6 (*)     All other components within normal limits   URINALYSIS, REFLEX TO URINE CULTURE - Abnormal; Notable for the following components:    Appearance, UA Hazy (*)     Occult Blood UA 1+ (*)     Nitrite, UA Positive (*)     Leukocytes, UA Trace (*)     All other components within normal limits    Narrative:     Specimen Source->Urine   URINALYSIS MICROSCOPIC - Abnormal; Notable for the following components:    RBC, UA 6  (*)     Bacteria Many (*)     All other components within normal limits    Narrative:     Specimen Source->Urine          Imaging Results          CT Head Without Contrast (Final result)  Result time 03/31/22 06:53:53    Final result by Logan Her MD (03/31/22 06:53:53)                 Impression:      1. There is lateral left frontal scalp laceration with skin staples in place.  There is no fracture.  2. There is no acute intracranial abnormality.  There is volume loss and nonspecific white matter change with stable ventriculomegaly.  Ventricular size may be slightly out of proportion to brain volume raising the question of normal pressure hydrocephalus.  Clinical correlation is there is no hemorrhage.  Note: Preliminary results were provided by Dr. Frank Kaufman ([facility]).  There is no significant discrepancy.      Electronically signed by: Logan Her MD  Date:    03/31/2022  Time:    06:53             Narrative:    EXAMINATION:  CT HEAD WITHOUT CONTRAST    CLINICAL HISTORY:  Head trauma, minor (Age >= 65y); Unspecified injury of head, initial encounter    TECHNIQUE:  Routine unenhanced axial images were obtained through the head.  Sagittal and coronal reformatted images were created.  The study is reviewed in bone and soft tissue windows.    COMPARISON:  Head CT dated 09/01/2021    FINDINGS:  Intracranial contents: There is no acute abnormality or change in the appearance of the brain compared to the prior study.  There is generalized volume loss and nonspecific white matter change.  There is a stable ventricular size.  The ventricles may be however out of proportion to brain volume raising the question of extraventricular obstructive hydrocephalus (normal pressure hydrocephalus).  There is no intracranial hemorrhage or mass effect.  There is no obvious acute edema or ischemia.  There is no abnormal extra-axial fluid collection.  Incidentally noted are tiny midline lipomas.  The cerebellar tonsils  are normal position.  Basilar cisterns are open.    Extracranial contents, calvarium, soft tissues: There is a lateral left frontal scalp laceration.  There is no acute fracture.  The included paranasal sinuses and mastoid air cells are clear.                                 Medications   LIDOcaine-EPINEPHrine (PF) 2%-1:200,000 injection 10 mL (10 mLs Intradermal Given 3/31/22 0630)   potassium chloride SA CR tablet 40 mEq (40 mEq Oral Given 3/31/22 0640)   acetaminophen tablet 650 mg (650 mg Oral Given 3/31/22 0744)     Medical Decision Making:   ED Management:  82-year-old female presents with a laceration and swelling to her left forehead sustained when she lost her balance and had a side table.  Laceration is repaired with staples.  CT of the head is obtained due to treatment with Plavix and scalp swelling.  CT fails to demonstrate any evidence of significant intracranial injury.  Cervical spine is cleared clinically with Pope cervical spine rules.  She has coincidentally found to have modest hypokalemia and is prescribed potassium chloride.                      Clinical Impression:   Final diagnoses:  [S09.90XA] Head injury  [S01.01XA] Laceration of scalp, initial encounter (Primary)  [S00.03XA] Contusion of scalp, initial encounter  [E87.6] Hypokalemia          ED Disposition Condition    Discharge Stable        ED Prescriptions     Medication Sig Dispense Start Date End Date Auth. Provider    potassium chloride SA (K-DUR,KLOR-CON) 20 MEQ tablet Take 1 tablet (20 mEq total) by mouth 2 (two) times daily. for 5 days 30 tablet 3/31/2022 4/5/2022 Bear Robles III, MD        Follow-up Information     Follow up With Specialties Details Why Contact Info    [facility] Emergency Dept Emergency Medicine In 1 week  [address] [place] [id number] [phone]           Bear Robles III, MD  03/31/22 3282

## 2022-04-17 ENCOUNTER — OFFICE VISIT (OUTPATIENT)
Dept: URGENT CARE | Facility: CLINIC | Age: 82
End: 2022-04-17
Payer: MEDICARE

## 2022-04-17 VITALS
DIASTOLIC BLOOD PRESSURE: 66 MMHG | TEMPERATURE: 99 F | SYSTOLIC BLOOD PRESSURE: 141 MMHG | HEART RATE: 68 BPM | OXYGEN SATURATION: 97 % | RESPIRATION RATE: 16 BRPM

## 2022-04-17 DIAGNOSIS — Z48.02 ENCOUNTER FOR STAPLE REMOVAL: Primary | ICD-10-CM

## 2022-04-17 PROCEDURE — 3077F SYST BP >= 140 MM HG: CPT | Mod: CPTII,S$GLB,, | Performed by: NURSE PRACTITIONER

## 2022-04-17 PROCEDURE — 1159F MED LIST DOCD IN RCRD: CPT | Mod: CPTII,S$GLB,, | Performed by: NURSE PRACTITIONER

## 2022-04-17 PROCEDURE — 3077F PR MOST RECENT SYSTOLIC BLOOD PRESSURE >= 140 MM HG: ICD-10-PCS | Mod: CPTII,S$GLB,, | Performed by: NURSE PRACTITIONER

## 2022-04-17 PROCEDURE — 1160F PR REVIEW ALL MEDS BY PRESCRIBER/CLIN PHARMACIST DOCUMENTED: ICD-10-PCS | Mod: CPTII,S$GLB,, | Performed by: NURSE PRACTITIONER

## 2022-04-17 PROCEDURE — 99204 OFFICE O/P NEW MOD 45 MIN: CPT | Mod: S$GLB,,, | Performed by: NURSE PRACTITIONER

## 2022-04-17 PROCEDURE — 1160F RVW MEDS BY RX/DR IN RCRD: CPT | Mod: CPTII,S$GLB,, | Performed by: NURSE PRACTITIONER

## 2022-04-17 PROCEDURE — 1159F PR MEDICATION LIST DOCUMENTED IN MEDICAL RECORD: ICD-10-PCS | Mod: CPTII,S$GLB,, | Performed by: NURSE PRACTITIONER

## 2022-04-17 PROCEDURE — 1157F PR ADVANCE CARE PLAN OR EQUIV PRESENT IN MEDICAL RECORD: ICD-10-PCS | Mod: CPTII,S$GLB,, | Performed by: NURSE PRACTITIONER

## 2022-04-17 PROCEDURE — 3078F DIAST BP <80 MM HG: CPT | Mod: CPTII,S$GLB,, | Performed by: NURSE PRACTITIONER

## 2022-04-17 PROCEDURE — 1157F ADVNC CARE PLAN IN RCRD: CPT | Mod: CPTII,S$GLB,, | Performed by: NURSE PRACTITIONER

## 2022-04-17 PROCEDURE — 3078F PR MOST RECENT DIASTOLIC BLOOD PRESSURE < 80 MM HG: ICD-10-PCS | Mod: CPTII,S$GLB,, | Performed by: NURSE PRACTITIONER

## 2022-04-17 PROCEDURE — 99204 PR OFFICE/OUTPT VISIT, NEW, LEVL IV, 45-59 MIN: ICD-10-PCS | Mod: S$GLB,,, | Performed by: NURSE PRACTITIONER

## 2022-04-17 NOTE — PROGRESS NOTES
Subjective:       Patient ID: Jerica Rios is a 82 y.o. female.    Vitals:  oral temperature is 98.8 °F (37.1 °C). Her blood pressure is 141/66 (abnormal) and her pulse is 68. Her respiration is 16 and oxygen saturation is 97%.     Chief Complaint: Suture / Staple Removal    Staples placed on laceration on head on 03/31/22.  Pt presents with son for staple removal.    Suture / Staple Removal        Skin: Positive for laceration (staples in place from repair) and erythema (mild erythema mostly at staple insertion sites.  Appropriate for stage of healing from recent trauma and foreign body reaction to staples.. No induration or drainage. No signs of infection). Negative for skin thickening/induration and bruising.       Objective:      Physical Exam   Constitutional: She is oriented to person, place, and time. She appears well-developed.  Non-toxic appearance. She does not appear ill. No distress.   HENT:   Head: Normocephalic and atraumatic.   Mouth/Throat: Oropharynx is clear and moist.   Eyes: Conjunctivae and EOM are normal. Right eye exhibits no discharge. Left eye exhibits no discharge.   Abdominal: Normal appearance.   Neurological: no focal deficit. She is alert, oriented to person, place, and time and at baseline.   Skin: Skin is warm, dry and not diaphoretic. Capillary refill takes 2 to 3 seconds. erythema (mild erythema mostly at staple insertion sites.  Appropriate for stage of healing from recent trauma and foreign body reaction to staples.. No induration or drainage. No signs of infection) No bruising         Comments: See attached photos   Psychiatric: Her behavior is normal. Mood normal.   Nursing note and vitals reviewed.chaperone present                   Assessment:       1. Encounter for staple removal          Plan:         Encounter for staple removal

## 2022-04-20 NOTE — PROCEDURES
Suture Removal    Date/Time: 4/17/2022 9:40 AM  Location procedure was performed: [facility]  Performed by: Nita Pepe NP  Authorized by: Nita Pepe NP   Pre-operative diagnosis: Laceration left scalp  Post-operative diagnosis: encounter for staple removal  Body area: head/neck  Location details: scalp  Wound Appearance: well healed, normal color, no drainage and clean  Staples Removed: 4  Post-removal: no dressing applied  Complications: No  Estimated blood loss (mL): 0  Specimens: No  Implants: No  Patient tolerance: Patient tolerated the procedure well with no immediate complications

## 2022-05-09 ENCOUNTER — PATIENT MESSAGE (OUTPATIENT)
Dept: SMOKING CESSATION | Facility: CLINIC | Age: 82
End: 2022-05-09
Payer: MEDICARE

## 2022-10-07 ENCOUNTER — PES CALL (OUTPATIENT)
Dept: ADMINISTRATIVE | Facility: CLINIC | Age: 82
End: 2022-10-07
Payer: MEDICARE

## 2022-10-17 ENCOUNTER — TELEPHONE (OUTPATIENT)
Dept: ADMINISTRATIVE | Facility: CLINIC | Age: 82
End: 2022-10-17
Payer: MEDICARE

## 2022-10-17 ENCOUNTER — PATIENT MESSAGE (OUTPATIENT)
Dept: ADMINISTRATIVE | Facility: CLINIC | Age: 82
End: 2022-10-17
Payer: MEDICARE

## 2022-10-19 ENCOUNTER — PES CALL (OUTPATIENT)
Dept: ADMINISTRATIVE | Facility: CLINIC | Age: 82
End: 2022-10-19
Payer: MEDICARE

## 2022-10-31 ENCOUNTER — TELEPHONE (OUTPATIENT)
Dept: ADMINISTRATIVE | Facility: CLINIC | Age: 82
End: 2022-10-31
Payer: MEDICARE

## 2022-10-31 ENCOUNTER — TELEPHONE (OUTPATIENT)
Dept: NEUROLOGY | Facility: CLINIC | Age: 82
End: 2022-10-31
Payer: MEDICARE

## 2022-10-31 NOTE — TELEPHONE ENCOUNTER
Called pt, informed pt's son I was calling to remind pt of her in office EAWV on 11/2/22; pt's son stated the appt was suppose to be scheduled as a virtual appt; appt rescheduled to 11/16/22 per pt's son request for a virtual appt

## 2022-10-31 NOTE — TELEPHONE ENCOUNTER
----- Message from Dominga Cheung sent at 10/31/2022  9:28 AM CDT -----  Contact: Rosalio (son) @ [phone]  Pts son says he is returning Evangelina's call.  Pls call.

## 2022-11-14 ENCOUNTER — TELEPHONE (OUTPATIENT)
Dept: ADMINISTRATIVE | Facility: CLINIC | Age: 82
End: 2022-11-14
Payer: MEDICARE

## 2022-11-14 ENCOUNTER — PATIENT MESSAGE (OUTPATIENT)
Dept: ADMINISTRATIVE | Facility: CLINIC | Age: 82
End: 2022-11-14
Payer: MEDICARE

## 2022-11-16 ENCOUNTER — TELEPHONE (OUTPATIENT)
Dept: ADMINISTRATIVE | Facility: CLINIC | Age: 82
End: 2022-11-16
Payer: MEDICARE

## 2022-11-16 ENCOUNTER — OFFICE VISIT (OUTPATIENT)
Dept: HOME HEALTH SERVICES | Facility: CLINIC | Age: 82
End: 2022-11-16
Payer: MEDICARE

## 2022-11-16 ENCOUNTER — TELEPHONE (OUTPATIENT)
Dept: FAMILY MEDICINE | Facility: CLINIC | Age: 82
End: 2022-11-16
Payer: MEDICARE

## 2022-11-16 VITALS — BODY MASS INDEX: 36.7 KG/M2 | WEIGHT: 215 LBS | HEIGHT: 64 IN

## 2022-11-16 DIAGNOSIS — E11.59 HYPERTENSION ASSOCIATED WITH DIABETES: ICD-10-CM

## 2022-11-16 DIAGNOSIS — Z00.00 ENCOUNTER FOR PREVENTIVE HEALTH EXAMINATION: Primary | ICD-10-CM

## 2022-11-16 DIAGNOSIS — N18.31 CHRONIC KIDNEY DISEASE, STAGE 3A: ICD-10-CM

## 2022-11-16 DIAGNOSIS — E66.01 MORBID (SEVERE) OBESITY DUE TO EXCESS CALORIES: ICD-10-CM

## 2022-11-16 DIAGNOSIS — R26.9 ABNORMALITY OF GAIT AND MOBILITY: ICD-10-CM

## 2022-11-16 DIAGNOSIS — E03.4 HYPOTHYROIDISM DUE TO ACQUIRED ATROPHY OF THYROID: ICD-10-CM

## 2022-11-16 DIAGNOSIS — K21.9 GASTROESOPHAGEAL REFLUX DISEASE, UNSPECIFIED WHETHER ESOPHAGITIS PRESENT: ICD-10-CM

## 2022-11-16 DIAGNOSIS — E11.9 CONTROLLED TYPE 2 DIABETES MELLITUS WITHOUT COMPLICATION, WITHOUT LONG-TERM CURRENT USE OF INSULIN: ICD-10-CM

## 2022-11-16 DIAGNOSIS — M51.36 DDD (DEGENERATIVE DISC DISEASE), LUMBAR: ICD-10-CM

## 2022-11-16 DIAGNOSIS — F03.90 DEMENTIA WITHOUT BEHAVIORAL DISTURBANCE: ICD-10-CM

## 2022-11-16 DIAGNOSIS — E78.5 HYPERLIPIDEMIA WITH TARGET LDL LESS THAN 70: ICD-10-CM

## 2022-11-16 DIAGNOSIS — E27.8 ADRENAL NODULE: ICD-10-CM

## 2022-11-16 DIAGNOSIS — I77.1 TORTUOUS AORTA: ICD-10-CM

## 2022-11-16 DIAGNOSIS — I25.118 ATHEROSCLEROTIC HEART DISEASE OF NATIVE CORONARY ARTERY WITH OTHER FORMS OF ANGINA PECTORIS: ICD-10-CM

## 2022-11-16 DIAGNOSIS — F33.1 MODERATE EPISODE OF RECURRENT MAJOR DEPRESSIVE DISORDER: ICD-10-CM

## 2022-11-16 DIAGNOSIS — Z99.89 DEPENDENCE ON OTHER ENABLING MACHINES AND DEVICES: ICD-10-CM

## 2022-11-16 DIAGNOSIS — I15.2 HYPERTENSION ASSOCIATED WITH DIABETES: ICD-10-CM

## 2022-11-16 DIAGNOSIS — M81.0 OSTEOPOROSIS, UNSPECIFIED OSTEOPOROSIS TYPE, UNSPECIFIED PATHOLOGICAL FRACTURE PRESENCE: ICD-10-CM

## 2022-11-16 PROCEDURE — 3288F PR FALLS RISK ASSESSMENT DOCUMENTED: ICD-10-PCS | Mod: CPTII,95,, | Performed by: NURSE PRACTITIONER

## 2022-11-16 PROCEDURE — 1159F PR MEDICATION LIST DOCUMENTED IN MEDICAL RECORD: ICD-10-PCS | Mod: CPTII,95,, | Performed by: NURSE PRACTITIONER

## 2022-11-16 PROCEDURE — 1100F PTFALLS ASSESS-DOCD GE2>/YR: CPT | Mod: CPTII,95,, | Performed by: NURSE PRACTITIONER

## 2022-11-16 PROCEDURE — 1125F PR PAIN SEVERITY QUANTIFIED, PAIN PRESENT: ICD-10-PCS | Mod: CPTII,95,, | Performed by: NURSE PRACTITIONER

## 2022-11-16 PROCEDURE — 1157F ADVNC CARE PLAN IN RCRD: CPT | Mod: CPTII,95,, | Performed by: NURSE PRACTITIONER

## 2022-11-16 PROCEDURE — 1159F MED LIST DOCD IN RCRD: CPT | Mod: CPTII,95,, | Performed by: NURSE PRACTITIONER

## 2022-11-16 PROCEDURE — 1157F PR ADVANCE CARE PLAN OR EQUIV PRESENT IN MEDICAL RECORD: ICD-10-PCS | Mod: CPTII,95,, | Performed by: NURSE PRACTITIONER

## 2022-11-16 PROCEDURE — 1160F PR REVIEW ALL MEDS BY PRESCRIBER/CLIN PHARMACIST DOCUMENTED: ICD-10-PCS | Mod: CPTII,95,, | Performed by: NURSE PRACTITIONER

## 2022-11-16 PROCEDURE — 1100F PR PT FALLS ASSESS DOC 2+ FALLS/FALL W/INJURY/YR: ICD-10-PCS | Mod: CPTII,95,, | Performed by: NURSE PRACTITIONER

## 2022-11-16 PROCEDURE — G0439 PPPS, SUBSEQ VISIT: HCPCS | Mod: 95,,, | Performed by: NURSE PRACTITIONER

## 2022-11-16 PROCEDURE — 99499 UNLISTED E&M SERVICE: CPT | Mod: 95,,, | Performed by: NURSE PRACTITIONER

## 2022-11-16 PROCEDURE — 1125F AMNT PAIN NOTED PAIN PRSNT: CPT | Mod: CPTII,95,, | Performed by: NURSE PRACTITIONER

## 2022-11-16 PROCEDURE — 1160F RVW MEDS BY RX/DR IN RCRD: CPT | Mod: CPTII,95,, | Performed by: NURSE PRACTITIONER

## 2022-11-16 PROCEDURE — 3288F FALL RISK ASSESSMENT DOCD: CPT | Mod: CPTII,95,, | Performed by: NURSE PRACTITIONER

## 2022-11-16 PROCEDURE — 99499 RISK ADDL DX/OHS AUDIT: ICD-10-PCS | Mod: 95,,, | Performed by: NURSE PRACTITIONER

## 2022-11-16 PROCEDURE — G0439 PR MEDICARE ANNUAL WELLNESS SUBSEQUENT VISIT: ICD-10-PCS | Mod: 95,,, | Performed by: NURSE PRACTITIONER

## 2022-11-16 NOTE — PATIENT INSTRUCTIONS
Counseling and Referral of Other Preventative  (Italic type indicates deductible and co-insurance are waived)    Patient Name: Jerica Rios  Today's Date: 11/16/2022    Health Maintenance       Date Due Completion Date    COVID-19 Vaccine (1) Never done ---    Shingles Vaccine (2 of 3) 09/16/2011 7/22/2011    Eye Exam 09/28/2017 9/28/2016    Override on 8/24/2016: Done (Dr Leah Bauer   report sent to scanning   kb)    Override on 3/30/2016: Done ([facility] 20/20 sent scanning)    Diabetes Urine Screening 11/15/2018 11/15/2017    DEXA Scan 11/15/2019 11/15/2017    Hemoglobin A1c 06/08/2022 12/8/2021    Influenza Vaccine (1) 09/01/2022 12/8/2021    Lipid Panel 12/08/2022 12/8/2021    TETANUS VACCINE 09/02/2025 9/2/2015        No orders of the defined types were placed in this encounter.    The following information is provided to all patients.  This information is to help you find resources for any of the problems found today that may be affecting your health:                Living healthy guide: www.[facility].[place].gov      Understanding Diabetes: www.diabetes.org      Eating healthy: www.cdc.gov/healthyweight      CDC home safety checklist: www.cdc.gov/steadi/patient.html      Agency on Aging: www.goea.[place].[facility]      Alcoholics anonymous (AA): www.aa.org      Physical Activity: www.matteo.nih.gov/zg6jwab      Tobacco use: www.quitwithusla.org

## 2022-11-18 ENCOUNTER — TELEPHONE (OUTPATIENT)
Dept: NEUROLOGY | Facility: CLINIC | Age: 82
End: 2022-11-18
Payer: MEDICARE

## 2022-11-18 DIAGNOSIS — F03.90 DEMENTIA WITHOUT BEHAVIORAL DISTURBANCE, UNSPECIFIED DEMENTIA TYPE: ICD-10-CM

## 2022-11-18 DIAGNOSIS — F03.90 UNSPECIFIED DEMENTIA WITHOUT BEHAVIORAL DISTURBANCE: ICD-10-CM

## 2022-11-18 RX ORDER — MEMANTINE HYDROCHLORIDE 10 MG/1
10 TABLET ORAL 2 TIMES DAILY
Qty: 180 TABLET | Refills: 0 | Status: CANCELLED | OUTPATIENT
Start: 2022-11-18

## 2022-11-18 RX ORDER — RIVASTIGMINE TARTRATE 4.5 MG/1
4.5 CAPSULE ORAL 2 TIMES DAILY
Qty: 60 CAPSULE | Refills: 0 | Status: CANCELLED | OUTPATIENT
Start: 2022-11-18

## 2022-11-18 NOTE — TELEPHONE ENCOUNTER
----- Message from Мария Bandab sent at 11/18/2022  9:17 AM CST -----  Contact: Son/ aydee  Type: Needs Medical Advice    Who Called:  SonColeen Castro  Symptoms (please be specific):  check up appt  Pharmacy:   [facility]/pharmacy #[id number] - [place] - [address]  [address]  [place] [address]  Phone: [phone] Fax: [phone]     [facility] Call Back Number: [phone]   Additional Information: The caller stated that they would like to melissa a virtual appt on Tuesday late afternoon sometime in Dec.  The caller also stated that the pt is out of her medication rivastigmine tartrate 4.5 mg capsule and memantine (NAMENDA) 10 MG Tab. The caller stated that they would like to get the medication refilled before appt. Please call caller back and advice. Thanks.

## 2022-11-20 PROBLEM — N18.31 CHRONIC KIDNEY DISEASE, STAGE 3A: Status: ACTIVE | Noted: 2022-11-20

## 2022-11-21 ENCOUNTER — TELEPHONE (OUTPATIENT)
Dept: NEUROLOGY | Facility: CLINIC | Age: 82
End: 2022-11-21
Payer: MEDICARE

## 2022-11-21 DIAGNOSIS — F03.90 MAJOR NEUROCOGNITIVE DISORDER: ICD-10-CM

## 2022-11-21 RX ORDER — RIVASTIGMINE TARTRATE 4.5 MG/1
4.5 CAPSULE ORAL 2 TIMES DAILY
Qty: 60 CAPSULE | Refills: 1 | Status: SHIPPED | OUTPATIENT
Start: 2022-11-21 | End: 2022-12-21 | Stop reason: SDUPTHER

## 2022-11-21 RX ORDER — MEMANTINE HYDROCHLORIDE 10 MG/1
10 TABLET ORAL 2 TIMES DAILY
Qty: 180 TABLET | Refills: 0 | Status: SHIPPED | OUTPATIENT
Start: 2022-11-21 | End: 2022-12-07 | Stop reason: SDUPTHER

## 2022-11-21 NOTE — TELEPHONE ENCOUNTER
----- Message from Tremontana Chevalier sent at 11/21/2022  2:52 PM CST -----  Regarding: Refill  Jerica Rios calling regarding Refills  (message) for #rivastigmine tartrate 4.5 mg capsule. Pt's son Matthew says pharm says RX denied because visit with doctor is required. Pls call @ [phone]    Pharm   [facility]/pharmacy #[id number] - [place] - [address]   Phone:  [phone]  Fax:  [phone]

## 2022-11-24 PROBLEM — E11.9 CONTROLLED TYPE 2 DIABETES MELLITUS WITHOUT COMPLICATION, WITHOUT LONG-TERM CURRENT USE OF INSULIN: Status: ACTIVE | Noted: 2022-11-24

## 2022-11-24 PROBLEM — E11.9 CONTROLLED TYPE 2 DIABETES MELLITUS: Status: ACTIVE | Noted: 2022-11-24

## 2022-11-25 NOTE — PROGRESS NOTES
"The patient location is: [place]  The chief complaint leading to consultation is: Health Risk Assessment    Visit type: audiovisual    Face to Face time with patient: 25  45 minutes of total time spent on the encounter, which includes face to face time and non-face to face time preparing to see the patient (eg, review of tests), Obtaining and/or reviewing separately obtained history, Documenting clinical information in the electronic or other health record, Independently interpreting results (not separately reported) and communicating results to the patient/family/caregiver, or Care coordination (not separately reported).         Each patient to whom he or she provides medical services by telemedicine is:  (1) informed of the relationship between the physician and patient and the respective role of any other health care provider with respect to management of the patient; and (2) notified that he or she may decline to receive medical services by telemedicine and may withdraw from such care at any time.    Notes:       Jerica Rios presented for a  Medicare AWV and comprehensive Health Risk Assessment today. The following components were reviewed and updated:    Medical history  Family History  Social history  Allergies and Current Medications  Health Risk Assessment  Health Maintenance  Care Team         ** See Completed Assessments for Annual Wellness Visit within the encounter summary.**         The following assessments were completed:  Living Situation  CAGE  Depression Screening  Fall Risk Assessment (MACH 10)  Hearing Assessment(HHI)  Cognitive Function Screening  Nutrition Screening  ADL Screening  PAQ Screening      Vitals:    11/16/22 1250   Weight: 97.5 kg (215 lb)   Height: 5' 4" (1.626 m)     Body mass index is 36.9 kg/m².  Physical Exam  Constitutional:       Appearance: She is obese.   Neurological:      Mental Status: She is alert. Mental status is at baseline.             Diagnoses and health " risks identified today and associated recommendations/orders:    1. Encounter for preventive health examination  Assessments completed. Preventive measures and health maintenance reviewed with patients son, caregiver.  Review for Opioid screening: Patient does not currently have any prescriptions for Opioids.  Review for substance use disorder: Patient does not use any substances.    2. Atherosclerotic heart disease of native coronary artery with other forms of angina pectoris  Stable, patient on Lipitor. Followed by PCP.    3. Controlled type 2 diabetes mellitus without complication, without long-term current use of insulin  Stable, patient on Metformin. Followed by PCP.    4. Chronic kidney disease, stage 3a  Stable, followed by PCP. Patients caregiver educated on avoiding giving patient any NSAID's.    5. Hypertension associated with diabetes  Stable, patient on Amlodipine, Avapro, and Hydrochlorothiazide. Followed by PCP.    6. Morbid (severe) obesity due to excess calories  Stable, followed by PCP. Healthy diet and tolerable exercise encouraged.    7. Adrenal nodule  Stable, followed by PCP.    8. Moderate episode of recurrent major depressive disorder  Stable, patient on Zoloft. Followed by PCP.    9. Tortuous aorta  Stable, patient on Lipitor. Followed by PCP.    10. Dementia without behavioral disturbance  Stable, patient on Namenda and Rivastigmine. Followed by Neurology.    11. DDD (degenerative disc disease), lumbar  Stable, followed by PCP.    12. Hyperlipidemia with target LDL less than 70, baseline LDL not available  Stable, patient on Lipitor. Followed by PCP.    13. Hypothyroidism due to acquired atrophy of thyroid  Stable, patient on Synthroid. Followed by PCP.    14. Gastroesophageal reflux disease, unspecified whether esophagitis present  Stable, patient on Nexium. Followed by PCP.    15. Osteoporosis, unspecified osteoporosis type, unspecified pathological fracture presence  Stable, patient on  Boniva and Vitamin D.    16. Abnormality of gait and mobility  Stable, patient uses walker for mobility. Followed by PCP. Patients caregiver denies any recent falls or injuries.    17. Dependence on other enabling machines and devices  Stable, patient uses walker for mobility. Followed by PCP.      Provided Jerica with a 5-10 year written screening schedule and personal prevention plan. Recommendations were developed using the USPSTF age appropriate recommendations. Education, counseling, and referrals were provided as needed. After Visit Summary printed and given to patient which includes a list of additional screenings\tests needed.    Follow up in about 1 year (around 11/16/2023) for your next annual wellness visit.    Miladys Woody, CARMEN

## 2022-12-07 ENCOUNTER — OFFICE VISIT (OUTPATIENT)
Dept: FAMILY MEDICINE | Facility: CLINIC | Age: 82
End: 2022-12-07
Payer: MEDICARE

## 2022-12-07 VITALS
RESPIRATION RATE: 16 BRPM | DIASTOLIC BLOOD PRESSURE: 60 MMHG | HEIGHT: 64 IN | BODY MASS INDEX: 36.9 KG/M2 | OXYGEN SATURATION: 95 % | TEMPERATURE: 99 F | HEART RATE: 62 BPM | SYSTOLIC BLOOD PRESSURE: 130 MMHG

## 2022-12-07 DIAGNOSIS — E87.6 HYPOKALEMIA: ICD-10-CM

## 2022-12-07 DIAGNOSIS — E11.9 TYPE 2 DIABETES MELLITUS WITHOUT COMPLICATION: ICD-10-CM

## 2022-12-07 DIAGNOSIS — M81.0 OSTEOPOROSIS, UNSPECIFIED OSTEOPOROSIS TYPE, UNSPECIFIED PATHOLOGICAL FRACTURE PRESENCE: ICD-10-CM

## 2022-12-07 DIAGNOSIS — E03.9 HYPOTHYROIDISM: ICD-10-CM

## 2022-12-07 DIAGNOSIS — E11.59 HYPERTENSION ASSOCIATED WITH DIABETES: ICD-10-CM

## 2022-12-07 DIAGNOSIS — E61.1 IRON DEFICIENCY: ICD-10-CM

## 2022-12-07 DIAGNOSIS — I15.2 HYPERTENSION ASSOCIATED WITH DIABETES: ICD-10-CM

## 2022-12-07 DIAGNOSIS — F41.8 DEPRESSION WITH ANXIETY: ICD-10-CM

## 2022-12-07 DIAGNOSIS — E78.5 HYPERLIPIDEMIA WITH TARGET LDL LESS THAN 70: ICD-10-CM

## 2022-12-07 DIAGNOSIS — E11.65 TYPE 2 DIABETES MELLITUS WITH HYPERGLYCEMIA, WITHOUT LONG-TERM CURRENT USE OF INSULIN: Primary | ICD-10-CM

## 2022-12-07 DIAGNOSIS — F03.90 MAJOR NEUROCOGNITIVE DISORDER: ICD-10-CM

## 2022-12-07 DIAGNOSIS — E03.4 HYPOTHYROIDISM DUE TO ACQUIRED ATROPHY OF THYROID: ICD-10-CM

## 2022-12-07 DIAGNOSIS — Z23 FLU VACCINE NEED: ICD-10-CM

## 2022-12-07 PROCEDURE — 3078F DIAST BP <80 MM HG: CPT | Mod: CPTII,S$GLB,, | Performed by: FAMILY MEDICINE

## 2022-12-07 PROCEDURE — 3288F PR FALLS RISK ASSESSMENT DOCUMENTED: ICD-10-PCS | Mod: CPTII,S$GLB,, | Performed by: FAMILY MEDICINE

## 2022-12-07 PROCEDURE — 3075F PR MOST RECENT SYSTOLIC BLOOD PRESS GE 130-139MM HG: ICD-10-PCS | Mod: CPTII,S$GLB,, | Performed by: FAMILY MEDICINE

## 2022-12-07 PROCEDURE — 1157F PR ADVANCE CARE PLAN OR EQUIV PRESENT IN MEDICAL RECORD: ICD-10-PCS | Mod: CPTII,S$GLB,, | Performed by: FAMILY MEDICINE

## 2022-12-07 PROCEDURE — 99999 PR PBB SHADOW E&M-EST. PATIENT-LVL III: CPT | Mod: PBBFAC,,, | Performed by: FAMILY MEDICINE

## 2022-12-07 PROCEDURE — G0008 ADMIN INFLUENZA VIRUS VAC: HCPCS | Mod: S$GLB,,, | Performed by: FAMILY MEDICINE

## 2022-12-07 PROCEDURE — 3075F SYST BP GE 130 - 139MM HG: CPT | Mod: CPTII,S$GLB,, | Performed by: FAMILY MEDICINE

## 2022-12-07 PROCEDURE — 1101F PR PT FALLS ASSESS DOC 0-1 FALLS W/OUT INJ PAST YR: ICD-10-PCS | Mod: CPTII,S$GLB,, | Performed by: FAMILY MEDICINE

## 2022-12-07 PROCEDURE — 3288F FALL RISK ASSESSMENT DOCD: CPT | Mod: CPTII,S$GLB,, | Performed by: FAMILY MEDICINE

## 2022-12-07 PROCEDURE — 1159F PR MEDICATION LIST DOCUMENTED IN MEDICAL RECORD: ICD-10-PCS | Mod: CPTII,S$GLB,, | Performed by: FAMILY MEDICINE

## 2022-12-07 PROCEDURE — 1101F PT FALLS ASSESS-DOCD LE1/YR: CPT | Mod: CPTII,S$GLB,, | Performed by: FAMILY MEDICINE

## 2022-12-07 PROCEDURE — 99999 PR PBB SHADOW E&M-EST. PATIENT-LVL III: ICD-10-PCS | Mod: PBBFAC,,, | Performed by: FAMILY MEDICINE

## 2022-12-07 PROCEDURE — 3078F PR MOST RECENT DIASTOLIC BLOOD PRESSURE < 80 MM HG: ICD-10-PCS | Mod: CPTII,S$GLB,, | Performed by: FAMILY MEDICINE

## 2022-12-07 PROCEDURE — 90694 VACC AIIV4 NO PRSRV 0.5ML IM: CPT | Mod: S$GLB,,, | Performed by: FAMILY MEDICINE

## 2022-12-07 PROCEDURE — 99214 PR OFFICE/OUTPT VISIT, EST, LEVL IV, 30-39 MIN: ICD-10-PCS | Mod: S$GLB,,, | Performed by: FAMILY MEDICINE

## 2022-12-07 PROCEDURE — 99214 OFFICE O/P EST MOD 30 MIN: CPT | Mod: S$GLB,,, | Performed by: FAMILY MEDICINE

## 2022-12-07 PROCEDURE — 1126F AMNT PAIN NOTED NONE PRSNT: CPT | Mod: CPTII,S$GLB,, | Performed by: FAMILY MEDICINE

## 2022-12-07 PROCEDURE — G0008 FLU VACCINE - QUADRIVALENT - ADJUVANTED: ICD-10-PCS | Mod: S$GLB,,, | Performed by: FAMILY MEDICINE

## 2022-12-07 PROCEDURE — 1160F PR REVIEW ALL MEDS BY PRESCRIBER/CLIN PHARMACIST DOCUMENTED: ICD-10-PCS | Mod: CPTII,S$GLB,, | Performed by: FAMILY MEDICINE

## 2022-12-07 PROCEDURE — 1126F PR PAIN SEVERITY QUANTIFIED, NO PAIN PRESENT: ICD-10-PCS | Mod: CPTII,S$GLB,, | Performed by: FAMILY MEDICINE

## 2022-12-07 PROCEDURE — 1160F RVW MEDS BY RX/DR IN RCRD: CPT | Mod: CPTII,S$GLB,, | Performed by: FAMILY MEDICINE

## 2022-12-07 PROCEDURE — 1159F MED LIST DOCD IN RCRD: CPT | Mod: CPTII,S$GLB,, | Performed by: FAMILY MEDICINE

## 2022-12-07 PROCEDURE — 90694 FLU VACCINE - QUADRIVALENT - ADJUVANTED: ICD-10-PCS | Mod: S$GLB,,, | Performed by: FAMILY MEDICINE

## 2022-12-07 PROCEDURE — 1157F ADVNC CARE PLAN IN RCRD: CPT | Mod: CPTII,S$GLB,, | Performed by: FAMILY MEDICINE

## 2022-12-07 RX ORDER — IRBESARTAN 300 MG/1
300 TABLET ORAL NIGHTLY
Qty: 90 TABLET | Refills: 3 | Status: SHIPPED | OUTPATIENT
Start: 2022-12-07 | End: 2024-01-03

## 2022-12-07 RX ORDER — SERTRALINE HYDROCHLORIDE 100 MG/1
100 TABLET, FILM COATED ORAL DAILY
Qty: 90 TABLET | Refills: 3 | Status: SHIPPED | OUTPATIENT
Start: 2022-12-07 | End: 2024-01-02

## 2022-12-07 RX ORDER — LANOLIN ALCOHOL/MO/W.PET/CERES
400 CREAM (GRAM) TOPICAL DAILY
Qty: 90 TABLET | Refills: 3 | Status: SHIPPED | OUTPATIENT
Start: 2022-12-07 | End: 2023-12-19

## 2022-12-07 RX ORDER — CLOPIDOGREL BISULFATE 75 MG/1
75 TABLET ORAL DAILY
Qty: 90 TABLET | Refills: 3 | Status: SHIPPED | OUTPATIENT
Start: 2022-12-07 | End: 2024-01-03

## 2022-12-07 RX ORDER — ATORVASTATIN CALCIUM 80 MG/1
80 TABLET, FILM COATED ORAL DAILY
Qty: 90 TABLET | Refills: 3 | Status: SHIPPED | OUTPATIENT
Start: 2022-12-07 | End: 2024-01-02

## 2022-12-07 RX ORDER — HYDROGEN PEROXIDE 3 %
20 SOLUTION, NON-ORAL MISCELLANEOUS DAILY
Qty: 90 CAPSULE | Refills: 3 | Status: SHIPPED | OUTPATIENT
Start: 2022-12-07 | End: 2023-12-18

## 2022-12-07 RX ORDER — IBANDRONATE SODIUM 150 MG/1
150 TABLET, FILM COATED ORAL
Qty: 3 TABLET | Refills: 3 | Status: SHIPPED | OUTPATIENT
Start: 2022-12-07 | End: 2023-09-25

## 2022-12-07 RX ORDER — FERROUS SULFATE 324(65)MG
325 TABLET, DELAYED RELEASE (ENTERIC COATED) ORAL DAILY
Qty: 90 TABLET | Refills: 3 | Status: SHIPPED | OUTPATIENT
Start: 2022-12-07 | End: 2023-12-19

## 2022-12-07 RX ORDER — MEMANTINE HYDROCHLORIDE 10 MG/1
10 TABLET ORAL 2 TIMES DAILY
Qty: 180 TABLET | Refills: 3 | Status: SHIPPED | OUTPATIENT
Start: 2022-12-07 | End: 2024-01-04

## 2022-12-07 RX ORDER — POTASSIUM CHLORIDE 750 MG/1
10 CAPSULE, EXTENDED RELEASE ORAL DAILY
Qty: 90 CAPSULE | Refills: 3 | Status: SHIPPED | OUTPATIENT
Start: 2022-12-07 | End: 2024-01-03

## 2022-12-07 RX ORDER — LEVOTHYROXINE SODIUM 50 UG/1
50 TABLET ORAL
Qty: 90 TABLET | Refills: 3 | Status: SHIPPED | OUTPATIENT
Start: 2022-12-07 | End: 2023-12-18

## 2022-12-07 RX ORDER — HYDROCHLOROTHIAZIDE 25 MG/1
25 TABLET ORAL DAILY
Qty: 90 TABLET | Refills: 3 | Status: SHIPPED | OUTPATIENT
Start: 2022-12-07 | End: 2024-01-03

## 2022-12-07 RX ORDER — AMLODIPINE BESYLATE 10 MG/1
10 TABLET ORAL DAILY
Qty: 90 TABLET | Refills: 3 | Status: SHIPPED | OUTPATIENT
Start: 2022-12-07 | End: 2023-12-18

## 2022-12-07 RX ORDER — METFORMIN HYDROCHLORIDE 500 MG/1
500 TABLET ORAL 2 TIMES DAILY WITH MEALS
Qty: 180 TABLET | Refills: 3 | Status: SHIPPED | OUTPATIENT
Start: 2022-12-07 | End: 2024-01-04

## 2022-12-07 NOTE — PROGRESS NOTES
Subjective:       Patient ID: Jerica Rios is a 82 y.o. female.    Chief Complaint: Annual Exam    HPI  Review of Systems   Constitutional:  Negative for fatigue and unexpected weight change.   Respiratory:  Negative for chest tightness and shortness of breath.    Cardiovascular:  Negative for chest pain, palpitations and leg swelling.   Gastrointestinal:  Negative for abdominal pain.   Musculoskeletal:  Negative for arthralgias.   Neurological:  Negative for dizziness, syncope, light-headedness and headaches.     Patient Active Problem List   Diagnosis    Adrenal nodule    Allergic rhinosinusitis    GERD (gastroesophageal reflux disease)    Depression with anxiety    Chronic pain    Atherosclerotic heart disease of native coronary artery with other forms of angina pectoris    DEVONTE (obstructive sleep apnea), not on Rx due to cost, off 2015, diagnosed in 4/2014    Presbylarynx, lower pitch, hoarse voice    Thyroid nodule, see CT April 2014    S/P coronary artery stent placement, multiple, last in [facility] 2010    Type 2 diabetes mellitus, controlled    Hyperlipidemia with target LDL less than 70, baseline LDL not available    DDD (degenerative disc disease), lumbar    Hypertension associated with diabetes    Abdominal obesity    Sedentary lifestyle    Primary osteoarthritis involving multiple joints    WELLS (dyspnea on exertion)    Diastolic dysfunction    Chronic fatigue    Hypothyroidism due to acquired atrophy of thyroid    Dementia without behavioral disturbance    Iron deficiency    Osteoporosis    Tortuous aorta    Moderate episode of recurrent major depressive disorder    Morbid (severe) obesity due to excess calories    Chronic kidney disease, stage 3a    Controlled type 2 diabetes mellitus without complication, without long-term current use of insulin     Patient is here for a chronic conditions follow up.    Here with her son Matthew with whom she lives and he has POA. She has dementia and h/o frequent  falls. Uses walker or rollator but occ forgets to use it. Also has a manual wheelchair for longer excursions.  Son noticed she was more unstable and falling more and had urine tested.  She did have a UTI and now completed abx.  He would like it retested to verify clearance.  She denies abd pain or burning. Has no vomiting or fever.        Neurology NP [facility] dementia  Ortho Dr. Nair tesha knee OA-takes ultracet 2 x day  CArd Dr. Jiang CAD s/p stents-multiple  Endocrine- Dr. De La Cruz/o thyroid nodules small non meeting FNA criteria 2015  last u/s; tesha adrenal adenomas last CT 2015 Bilateral adrenal adenomas, stable for 4 years.  Prior cholecystectomy.  Small cyst of the spleen.  Two cysts of the right kidney and one cyst of the left kidney.  Prominent amount of stool suggests constipation       Type 2 DM- needs eye exam -son will set up with new specialist.  On ARB and statin and on plavix. Last a1c 5.5 8/19     Osteoporosis- started boniva approx 2018  Objective:      Physical Exam  Vitals and nursing note reviewed.   Constitutional:       Appearance: She is well-developed.   Cardiovascular:      Rate and Rhythm: Normal rate and regular rhythm.      Heart sounds: Normal heart sounds.   Pulmonary:      Effort: Pulmonary effort is normal.      Breath sounds: Normal breath sounds.   Skin:     General: Skin is warm and dry.   Neurological:      Mental Status: She is alert and oriented to person, place, and time.       Assessment:       1. Type 2 diabetes mellitus with hyperglycemia, without long-term current use of insulin    2. Hypertension associated with diabetes    3. Hypothyroidism due to acquired atrophy of thyroid    4. Hyperlipidemia with target LDL less than 70    5. Hypokalemia    6. Iron deficiency    7. Flu vaccine need    8. Osteoporosis, unspecified osteoporosis type, unspecified pathological fracture presence    9. Hypothyroidism    10. Major neurocognitive disorder    11. Type 2 diabetes mellitus without  complication    12. Depression with anxiety        Plan:         1. Type 2 diabetes mellitus with hyperglycemia, without long-term current use of insulin  Stable condition.  Continue current medications.  Will adjust based on lab findings or if condition changes.    - Hemoglobin A1C; Future  - Microalbumin/Creatinine Ratio, Urine; Future    2. Hypertension associated with diabetes  Controlled on current medications.  Continue current medications.    - amLODIPine (NORVASC) 10 MG tablet; Take 1 tablet (10 mg total) by mouth once daily.  Dispense: 90 tablet; Refill: 3    3. Hypothyroidism due to acquired atrophy of thyroid  Controlled on current medications.  Continue current medications.    - CBC Auto Differential; Future  - TSH; Future  - T4, Free; Future    4. Hyperlipidemia with target LDL less than 70  Stable condition.  Continue current medications.  Will adjust based on lab findings or if condition changes.    - Comprehensive Metabolic Panel; Future  - Lipid Panel; Future    5. Hypokalemia  Screen and treat as indicated:    - Magnesium; Future    6. Iron deficiency  Screen and treat as indicated:    - Ferritin; Future  - Iron and TIBC; Future    7. Flu vaccine need  Immunize today.  Counseled patient on risks, benefits and side effects.  Patient elected to proceed with vaccination.    - Influenza (FLUAD) - Quadrivalent (Adjuvanted) *Preferred* (65+) (PF)    8. Osteoporosis, unspecified osteoporosis type, unspecified pathological fracture presence  treat  - ibandronate (BONIVA) 150 mg tablet; Take 1 tablet (150 mg total) by mouth every 30 days.  Dispense: 3 tablet; Refill: 3    9. Hypothyroidism  Stable condition.  Continue current medications.  Will adjust based on lab findings or if condition changes.    - levothyroxine (SYNTHROID) 50 MCG tablet; Take 1 tablet (50 mcg total) by mouth before breakfast.  Dispense: 90 tablet; Refill: 3    10. Major neurocognitive disorder  continue  - memantine (NAMENDA) 10 MG  Tab; Take 1 tablet (10 mg total) by mouth 2 (two) times daily.  Dispense: 180 tablet; Refill: 3    11. Type 2 diabetes mellitus without complication  Stable condition.  Continue current medications.  Will adjust based on lab findings or if condition changes.    - metFORMIN (GLUCOPHAGE) 500 MG tablet; Take 1 tablet (500 mg total) by mouth 2 (two) times daily with meals.  Dispense: 180 tablet; Refill: 3    12. Depression with anxiety  Stable condition.  Continue current medications.  Will adjust based on lab findings or if condition changes.    - sertraline (ZOLOFT) 100 MG tablet; Take 1 tablet (100 mg total) by mouth once daily.  Dispense: 90 tablet; Refill: 3        Time spent with patient: 20 minutes    Patient with be reevaluated in 6 months or sooner prn    Greater than 50% of this visit was spent counseling as described in above documentation:Yes

## 2022-12-13 ENCOUNTER — PATIENT MESSAGE (OUTPATIENT)
Dept: ADMINISTRATIVE | Facility: HOSPITAL | Age: 82
End: 2022-12-13
Payer: MEDICARE

## 2023-01-03 ENCOUNTER — OFFICE VISIT (OUTPATIENT)
Dept: NEUROLOGY | Facility: CLINIC | Age: 83
End: 2023-01-03
Payer: MEDICARE

## 2023-01-03 DIAGNOSIS — F03.90 MAJOR NEUROCOGNITIVE DISORDER: ICD-10-CM

## 2023-01-03 DIAGNOSIS — M15.9 PRIMARY OSTEOARTHRITIS INVOLVING MULTIPLE JOINTS: ICD-10-CM

## 2023-01-03 DIAGNOSIS — E11.59 HYPERTENSION ASSOCIATED WITH DIABETES: ICD-10-CM

## 2023-01-03 DIAGNOSIS — I15.2 HYPERTENSION ASSOCIATED WITH DIABETES: ICD-10-CM

## 2023-01-03 DIAGNOSIS — F03.90 DEMENTIA WITHOUT BEHAVIORAL DISTURBANCE: Primary | ICD-10-CM

## 2023-01-03 PROCEDURE — 1159F PR MEDICATION LIST DOCUMENTED IN MEDICAL RECORD: ICD-10-PCS | Mod: CPTII,95,, | Performed by: NURSE PRACTITIONER

## 2023-01-03 PROCEDURE — 1160F PR REVIEW ALL MEDS BY PRESCRIBER/CLIN PHARMACIST DOCUMENTED: ICD-10-PCS | Mod: CPTII,95,, | Performed by: NURSE PRACTITIONER

## 2023-01-03 PROCEDURE — 1157F ADVNC CARE PLAN IN RCRD: CPT | Mod: CPTII,95,, | Performed by: NURSE PRACTITIONER

## 2023-01-03 PROCEDURE — 99215 PR OFFICE/OUTPT VISIT, EST, LEVL V, 40-54 MIN: ICD-10-PCS | Mod: 95,,, | Performed by: NURSE PRACTITIONER

## 2023-01-03 PROCEDURE — 99499 UNLISTED E&M SERVICE: CPT | Mod: 95,,, | Performed by: NURSE PRACTITIONER

## 2023-01-03 PROCEDURE — 99215 OFFICE O/P EST HI 40 MIN: CPT | Mod: 95,,, | Performed by: NURSE PRACTITIONER

## 2023-01-03 PROCEDURE — 1160F RVW MEDS BY RX/DR IN RCRD: CPT | Mod: CPTII,95,, | Performed by: NURSE PRACTITIONER

## 2023-01-03 PROCEDURE — 1159F MED LIST DOCD IN RCRD: CPT | Mod: CPTII,95,, | Performed by: NURSE PRACTITIONER

## 2023-01-03 PROCEDURE — 1157F PR ADVANCE CARE PLAN OR EQUIV PRESENT IN MEDICAL RECORD: ICD-10-PCS | Mod: CPTII,95,, | Performed by: NURSE PRACTITIONER

## 2023-01-03 PROCEDURE — 99499 RISK ADDL DX/OHS AUDIT: ICD-10-PCS | Mod: 95,,, | Performed by: NURSE PRACTITIONER

## 2023-01-03 RX ORDER — RIVASTIGMINE TARTRATE 6 MG/1
6 CAPSULE ORAL 2 TIMES DAILY
Qty: 180 CAPSULE | Refills: 3 | Status: SHIPPED | OUTPATIENT
Start: 2023-01-03 | End: 2023-09-25

## 2023-01-03 NOTE — PROGRESS NOTES
1-3-23      The patient location is: [place]   The chief complaint leading to consultation is: memory     Visit type: audiovisual    Face to Face time with patient: 48  minutes of total time spent on the encounter, which includes face to face time and non-face to face time preparing to see the patient (eg, review of tests), Obtaining and/or reviewing separately obtained history, Documenting clinical information in the electronic or other health record, Independently interpreting results (not separately reported) and communicating results to the patient/family/caregiver, or Care coordination (not separately reported).         Each patient to whom he or she provides medical services by telemedicine is:  (1) informed of the relationship between the physician and patient and the respective role of any other health care provider with respect to management of the patient; and (2) notified that he or she may decline to receive medical services by telemedicine and may withdraw from such care at any time.    Notes:   84 yo RH female with DM2, DEVONTE, CAD, HLD, HTN presents for Follow up of dementia without behavioral disturbance, last seen 8-23-21. Rivastigmine was increased from 4.5 mg to 6 mg BID. Memantine cont without change. Last MOCA was 7-23-18 with 9+1=10/30. With delayed recall, she did not obtain any points but with cuing, she recalled all 5 words. Pattern seems most fitting of vascular dementia. Today is her 83rd birthday!    Son provides essentially all hx as follows:  Things pretty good.   Does have arthristis in low back and knee, ongoing for years. She was taking tramadol but not taken in long time. Now takes Tyl Arthritis. Worse in evening.     Nothing really changed. Flashes of brilliance. May even have a recollection of short term things. But will then forget is she has had her coffee.     Bumped up rivastigine to 6 mg BID, beth well. When got last refill it was 4.5 mg. Needs changed back to 6 mg.    Also still  taking memantine. Keisha well.     Gradual decline in memory since last visit.   Notes progression incrementally.   Has cameras in house and checks on routinely while he is at work.    Still able to feed and dress self.   Able to call him on phone. She will ask him to pick something up on way home.   Mobility is difficult. Impossible for her to get in tub.   He or his daughter help her with hygiene.  He does all the laundry, meals, finances, appointments.    Makes sure she has snacks to grab through day. He will make her a sandwich or something she can grab and eat through day while he works.   Does not eat that much. She will eat more when he is there. He feels she is more comfortable when he is home.   He has corssword puzzles for her to do. Also likes watching [employer].      He has POA.   Mood is ok.  Sleep is a little difficult at times. Melatonin really helps.   No issues with swallowing or choking.    LIMITED PE  Awake, alert, pleasant and cooperative.   Content to have son provide history. Does offer that it is her birthday and comments briefly on arthritis, otherwise does not participate in conversation.  RR equal and unlabored. NAD.   Face symmetric.   Hearing intact to conversation.   Seated.      IMPRESSION:  Dementia, late onset without behavioral disturbance, likely at least some vascular involvement given her vascular RF.      PLAN:  Rivastgimine increased back to 6 mg BID  Contine mematnine  Discussed vascular RF and importance of RF control.  Follow up one year, unless needed sooner. VV is fine.    Son is POA.   Discussed progression of dementia and considering what her care needs may look like going forward. Perhaps help in house vs facility. He has considered on occasion. No decisions at this point.     ..  ..Lesly Humphreys, JONO, NP-C

## 2023-02-16 ENCOUNTER — TELEPHONE (OUTPATIENT)
Dept: FAMILY MEDICINE | Facility: CLINIC | Age: 83
End: 2023-02-16
Payer: MEDICARE

## 2023-02-16 NOTE — TELEPHONE ENCOUNTER
----- Message from Aylin Hamlinmason sent at 2/16/2023  9:04 AM CST -----  Contact: Daughter Kaila  Type: Needs Medical Advice  Who Called:  Kaila  Symptoms (please be specific):  Diarrhea   How long has patient had these symptoms:  since yesterday and not stopping  Pharmacy name and phone #:    [id number] [place] - [id number]  [address] [address]  Phone: [phone] Fax: [phone]  Best Call Back Number [phone]  Additional Information: plus wanted to discuss maybe getting her a hospital bed to make it easier for her to get in and out of the bed.

## 2023-02-17 ENCOUNTER — TELEPHONE (OUTPATIENT)
Dept: FAMILY MEDICINE | Facility: CLINIC | Age: 83
End: 2023-02-17
Payer: MEDICARE

## 2023-02-17 NOTE — TELEPHONE ENCOUNTER
Returned patients call in regards needing an order for a hospital bed and needing to discuss other issues. No answer , left voicemail to return call.

## 2023-02-17 NOTE — TELEPHONE ENCOUNTER
----- Message from Jeanine Morales sent at 2/17/2023  9:11 AM CST -----  Contact: called at [phone]  Type: Needs Medical Advice  Who Called:  Pt's daughter  Best Call Back Number: [phone]  Additional Information: Pt's daughter is calling regards to the pt needing an hospital bed. As well as the diarrhea has gotten better but still an issue. Please call back and advise.

## 2023-05-17 RX ORDER — BACLOFEN 10 MG/1
TABLET ORAL
Qty: 90 TABLET | Refills: 2 | Status: SHIPPED | OUTPATIENT
Start: 2023-05-17

## 2023-05-30 ENCOUNTER — PES CALL (OUTPATIENT)
Dept: ADMINISTRATIVE | Facility: CLINIC | Age: 83
End: 2023-05-30
Payer: MEDICARE

## 2023-06-07 ENCOUNTER — TELEPHONE (OUTPATIENT)
Dept: FAMILY MEDICINE | Facility: CLINIC | Age: 83
End: 2023-06-07
Payer: MEDICARE

## 2023-06-07 DIAGNOSIS — E78.5 HYPERLIPIDEMIA WITH TARGET LDL LESS THAN 70: ICD-10-CM

## 2023-06-07 DIAGNOSIS — E03.4 HYPOTHYROIDISM DUE TO ACQUIRED ATROPHY OF THYROID: ICD-10-CM

## 2023-06-07 DIAGNOSIS — E11.65 TYPE 2 DIABETES MELLITUS WITH HYPERGLYCEMIA, WITHOUT LONG-TERM CURRENT USE OF INSULIN: Primary | ICD-10-CM

## 2023-06-07 DIAGNOSIS — E11.59 HYPERTENSION ASSOCIATED WITH DIABETES: ICD-10-CM

## 2023-06-07 DIAGNOSIS — I15.2 HYPERTENSION ASSOCIATED WITH DIABETES: ICD-10-CM

## 2023-06-07 DIAGNOSIS — E61.1 IRON DEFICIENCY: ICD-10-CM

## 2023-06-07 NOTE — TELEPHONE ENCOUNTER
----- Message from Deanna Weinstein sent at 6/7/2023 10:17 AM CDT -----  Contact: Pt son at [phone]  Type: Needs Medical Advice  Who Called:  Pt  Best Call Back Number: [phone]  Additional Information: Pt need orders for labs for Annual visit on 7/5/23.  Please call back to advise.  .

## 2023-06-28 ENCOUNTER — LAB VISIT (OUTPATIENT)
Dept: LAB | Facility: HOSPITAL | Age: 83
End: 2023-06-28
Attending: NURSE PRACTITIONER
Payer: MEDICARE

## 2023-06-28 ENCOUNTER — PES CALL (OUTPATIENT)
Dept: ADMINISTRATIVE | Facility: CLINIC | Age: 83
End: 2023-06-28
Payer: MEDICARE

## 2023-06-28 DIAGNOSIS — E78.5 HYPERLIPIDEMIA WITH TARGET LDL LESS THAN 70: ICD-10-CM

## 2023-06-28 DIAGNOSIS — E11.59 HYPERTENSION ASSOCIATED WITH DIABETES: ICD-10-CM

## 2023-06-28 DIAGNOSIS — E61.1 IRON DEFICIENCY: ICD-10-CM

## 2023-06-28 DIAGNOSIS — E11.65 TYPE 2 DIABETES MELLITUS WITH HYPERGLYCEMIA, WITHOUT LONG-TERM CURRENT USE OF INSULIN: ICD-10-CM

## 2023-06-28 DIAGNOSIS — E03.4 HYPOTHYROIDISM DUE TO ACQUIRED ATROPHY OF THYROID: ICD-10-CM

## 2023-06-28 DIAGNOSIS — I15.2 HYPERTENSION ASSOCIATED WITH DIABETES: ICD-10-CM

## 2023-06-28 LAB
ALBUMIN SERPL BCP-MCNC: 4.2 G/DL (ref 3.5–5.2)
ALP SERPL-CCNC: 105 U/L (ref 55–135)
ALT SERPL W/O P-5'-P-CCNC: 23 U/L (ref 10–44)
ANION GAP SERPL CALC-SCNC: 11 MMOL/L (ref 8–16)
AST SERPL-CCNC: 18 U/L (ref 10–40)
BASOPHILS # BLD AUTO: 0.05 K/UL (ref 0–0.2)
BASOPHILS NFR BLD: 0.4 % (ref 0–1.9)
BILIRUB SERPL-MCNC: 0.5 MG/DL (ref 0.1–1)
BUN SERPL-MCNC: 17 MG/DL (ref 8–23)
CALCIUM SERPL-MCNC: 9.9 MG/DL (ref 8.7–10.5)
CHLORIDE SERPL-SCNC: 102 MMOL/L (ref 95–110)
CHOLEST SERPL-MCNC: 145 MG/DL (ref 120–199)
CHOLEST/HDLC SERPL: 3.1 {RATIO} (ref 2–5)
CO2 SERPL-SCNC: 26 MMOL/L (ref 23–29)
CREAT SERPL-MCNC: 1 MG/DL (ref 0.5–1.4)
DIFFERENTIAL METHOD: ABNORMAL
EOSINOPHIL # BLD AUTO: 0.2 K/UL (ref 0–0.5)
EOSINOPHIL NFR BLD: 2 % (ref 0–8)
ERYTHROCYTE [DISTWIDTH] IN BLOOD BY AUTOMATED COUNT: 15.3 % (ref 11.5–14.5)
EST. GFR  (NO RACE VARIABLE): 55.9 ML/MIN/1.73 M^2
ESTIMATED AVG GLUCOSE: 108 MG/DL (ref 68–131)
FERRITIN SERPL-MCNC: 169 NG/ML (ref 20–300)
GLUCOSE SERPL-MCNC: 83 MG/DL (ref 70–110)
HBA1C MFR BLD: 5.4 % (ref 4–5.6)
HCT VFR BLD AUTO: 34.2 % (ref 37–48.5)
HDLC SERPL-MCNC: 47 MG/DL (ref 40–75)
HDLC SERPL: 32.4 % (ref 20–50)
HGB BLD-MCNC: 11.3 G/DL (ref 12–16)
IMM GRANULOCYTES # BLD AUTO: 0.04 K/UL (ref 0–0.04)
IMM GRANULOCYTES NFR BLD AUTO: 0.3 % (ref 0–0.5)
IRON SERPL-MCNC: 70 UG/DL (ref 30–160)
LDLC SERPL CALC-MCNC: 72 MG/DL (ref 63–159)
LYMPHOCYTES # BLD AUTO: 2.9 K/UL (ref 1–4.8)
LYMPHOCYTES NFR BLD: 24.1 % (ref 18–48)
MCH RBC QN AUTO: 30.1 PG (ref 27–31)
MCHC RBC AUTO-ENTMCNC: 33 G/DL (ref 32–36)
MCV RBC AUTO: 91 FL (ref 82–98)
MONOCYTES # BLD AUTO: 1.1 K/UL (ref 0.3–1)
MONOCYTES NFR BLD: 9 % (ref 4–15)
NEUTROPHILS # BLD AUTO: 7.7 K/UL (ref 1.8–7.7)
NEUTROPHILS NFR BLD: 64.2 % (ref 38–73)
NONHDLC SERPL-MCNC: 98 MG/DL
NRBC BLD-RTO: 0 /100 WBC
PLATELET # BLD AUTO: 388 K/UL (ref 150–450)
PMV BLD AUTO: 10.8 FL (ref 9.2–12.9)
POTASSIUM SERPL-SCNC: 4.2 MMOL/L (ref 3.5–5.1)
PROT SERPL-MCNC: 7.4 G/DL (ref 6–8.4)
RBC # BLD AUTO: 3.76 M/UL (ref 4–5.4)
SATURATED IRON: 21 % (ref 20–50)
SODIUM SERPL-SCNC: 139 MMOL/L (ref 136–145)
T4 FREE SERPL-MCNC: 0.93 NG/DL (ref 0.71–1.51)
TOTAL IRON BINDING CAPACITY: 327 UG/DL (ref 250–450)
TRANSFERRIN SERPL-MCNC: 221 MG/DL (ref 200–375)
TRIGL SERPL-MCNC: 130 MG/DL (ref 30–150)
TSH SERPL DL<=0.005 MIU/L-ACNC: 2.42 UIU/ML (ref 0.4–4)
WBC # BLD AUTO: 11.98 K/UL (ref 3.9–12.7)

## 2023-06-28 PROCEDURE — 84443 ASSAY THYROID STIM HORMONE: CPT | Performed by: NURSE PRACTITIONER

## 2023-06-28 PROCEDURE — 83036 HEMOGLOBIN GLYCOSYLATED A1C: CPT | Performed by: NURSE PRACTITIONER

## 2023-06-28 PROCEDURE — 36415 COLL VENOUS BLD VENIPUNCTURE: CPT | Mod: PO | Performed by: NURSE PRACTITIONER

## 2023-06-28 PROCEDURE — 80053 COMPREHEN METABOLIC PANEL: CPT | Performed by: NURSE PRACTITIONER

## 2023-06-28 PROCEDURE — 85025 COMPLETE CBC W/AUTO DIFF WBC: CPT | Performed by: NURSE PRACTITIONER

## 2023-06-28 PROCEDURE — 84439 ASSAY OF FREE THYROXINE: CPT | Performed by: NURSE PRACTITIONER

## 2023-06-28 PROCEDURE — 84466 ASSAY OF TRANSFERRIN: CPT | Performed by: NURSE PRACTITIONER

## 2023-06-28 PROCEDURE — 82728 ASSAY OF FERRITIN: CPT | Performed by: NURSE PRACTITIONER

## 2023-06-28 PROCEDURE — 80061 LIPID PANEL: CPT | Performed by: NURSE PRACTITIONER

## 2023-08-02 ENCOUNTER — OFFICE VISIT (OUTPATIENT)
Dept: FAMILY MEDICINE | Facility: CLINIC | Age: 83
End: 2023-08-02
Payer: MEDICARE

## 2023-08-02 VITALS
DIASTOLIC BLOOD PRESSURE: 58 MMHG | TEMPERATURE: 99 F | HEART RATE: 72 BPM | BODY MASS INDEX: 40.8 KG/M2 | OXYGEN SATURATION: 96 % | SYSTOLIC BLOOD PRESSURE: 138 MMHG | WEIGHT: 239 LBS | HEIGHT: 64 IN

## 2023-08-02 DIAGNOSIS — E11.59 HYPERTENSION ASSOCIATED WITH DIABETES: ICD-10-CM

## 2023-08-02 DIAGNOSIS — F41.8 DEPRESSION WITH ANXIETY: ICD-10-CM

## 2023-08-02 DIAGNOSIS — E03.4 HYPOTHYROIDISM DUE TO ACQUIRED ATROPHY OF THYROID: ICD-10-CM

## 2023-08-02 DIAGNOSIS — E11.65 TYPE 2 DIABETES MELLITUS WITH HYPERGLYCEMIA, WITHOUT LONG-TERM CURRENT USE OF INSULIN: Primary | ICD-10-CM

## 2023-08-02 DIAGNOSIS — E66.01 OBESITY, MORBID, BMI 40.0-49.9: ICD-10-CM

## 2023-08-02 DIAGNOSIS — I15.2 HYPERTENSION ASSOCIATED WITH DIABETES: ICD-10-CM

## 2023-08-02 DIAGNOSIS — E78.5 HYPERLIPIDEMIA WITH TARGET LDL LESS THAN 70: ICD-10-CM

## 2023-08-02 PROCEDURE — 99999 PR PBB SHADOW E&M-EST. PATIENT-LVL V: ICD-10-PCS | Mod: PBBFAC,,, | Performed by: NURSE PRACTITIONER

## 2023-08-02 PROCEDURE — 1157F PR ADVANCE CARE PLAN OR EQUIV PRESENT IN MEDICAL RECORD: ICD-10-PCS | Mod: CPTII,S$GLB,, | Performed by: NURSE PRACTITIONER

## 2023-08-02 PROCEDURE — 3288F FALL RISK ASSESSMENT DOCD: CPT | Mod: CPTII,S$GLB,, | Performed by: NURSE PRACTITIONER

## 2023-08-02 PROCEDURE — 1160F PR REVIEW ALL MEDS BY PRESCRIBER/CLIN PHARMACIST DOCUMENTED: ICD-10-PCS | Mod: CPTII,S$GLB,, | Performed by: NURSE PRACTITIONER

## 2023-08-02 PROCEDURE — 3288F PR FALLS RISK ASSESSMENT DOCUMENTED: ICD-10-PCS | Mod: CPTII,S$GLB,, | Performed by: NURSE PRACTITIONER

## 2023-08-02 PROCEDURE — 99214 OFFICE O/P EST MOD 30 MIN: CPT | Mod: S$GLB,,, | Performed by: NURSE PRACTITIONER

## 2023-08-02 PROCEDURE — 3075F SYST BP GE 130 - 139MM HG: CPT | Mod: CPTII,S$GLB,, | Performed by: NURSE PRACTITIONER

## 2023-08-02 PROCEDURE — 1157F ADVNC CARE PLAN IN RCRD: CPT | Mod: CPTII,S$GLB,, | Performed by: NURSE PRACTITIONER

## 2023-08-02 PROCEDURE — 1101F PT FALLS ASSESS-DOCD LE1/YR: CPT | Mod: CPTII,S$GLB,, | Performed by: NURSE PRACTITIONER

## 2023-08-02 PROCEDURE — 1159F PR MEDICATION LIST DOCUMENTED IN MEDICAL RECORD: ICD-10-PCS | Mod: CPTII,S$GLB,, | Performed by: NURSE PRACTITIONER

## 2023-08-02 PROCEDURE — 1160F RVW MEDS BY RX/DR IN RCRD: CPT | Mod: CPTII,S$GLB,, | Performed by: NURSE PRACTITIONER

## 2023-08-02 PROCEDURE — 3075F PR MOST RECENT SYSTOLIC BLOOD PRESS GE 130-139MM HG: ICD-10-PCS | Mod: CPTII,S$GLB,, | Performed by: NURSE PRACTITIONER

## 2023-08-02 PROCEDURE — 1126F PR PAIN SEVERITY QUANTIFIED, NO PAIN PRESENT: ICD-10-PCS | Mod: CPTII,S$GLB,, | Performed by: NURSE PRACTITIONER

## 2023-08-02 PROCEDURE — 99999 PR PBB SHADOW E&M-EST. PATIENT-LVL V: CPT | Mod: PBBFAC,,, | Performed by: NURSE PRACTITIONER

## 2023-08-02 PROCEDURE — 1126F AMNT PAIN NOTED NONE PRSNT: CPT | Mod: CPTII,S$GLB,, | Performed by: NURSE PRACTITIONER

## 2023-08-02 PROCEDURE — 3078F PR MOST RECENT DIASTOLIC BLOOD PRESSURE < 80 MM HG: ICD-10-PCS | Mod: CPTII,S$GLB,, | Performed by: NURSE PRACTITIONER

## 2023-08-02 PROCEDURE — 1101F PR PT FALLS ASSESS DOC 0-1 FALLS W/OUT INJ PAST YR: ICD-10-PCS | Mod: CPTII,S$GLB,, | Performed by: NURSE PRACTITIONER

## 2023-08-02 PROCEDURE — 3078F DIAST BP <80 MM HG: CPT | Mod: CPTII,S$GLB,, | Performed by: NURSE PRACTITIONER

## 2023-08-02 PROCEDURE — 1159F MED LIST DOCD IN RCRD: CPT | Mod: CPTII,S$GLB,, | Performed by: NURSE PRACTITIONER

## 2023-08-02 PROCEDURE — 99214 PR OFFICE/OUTPT VISIT, EST, LEVL IV, 30-39 MIN: ICD-10-PCS | Mod: S$GLB,,, | Performed by: NURSE PRACTITIONER

## 2023-08-02 NOTE — PROGRESS NOTES
Subjective:       Patient ID: Jerica Rios is a 83 y.o. female.    Chief Complaint: Follow-up (6 month follow up)    HPI  Patient is here for a chronic conditions follow up.    Here with her son Matthew with whom she lives and he has POA    Neurology NP [facility] dementia  Ortho Dr. Korey parkinson knee OA-takes ultracet 2 x day  CArd Dr. Apolinar WARNER s/p stents-multiple  Endocrine- Dr. Galeash/o thyroid nodules  Past Medical History:   Diagnosis Date    Allergy     Arthritis     Carotid artery disease     Coronary artery disease     Diabetes mellitus     Diabetes mellitus, type 2     Fall at home     GERD (gastroesophageal reflux disease)     Headache(784.0)     Hyperlipidemia     Hypertension     Mental disorder     Obesity     Right rib fracture     Snoring     Toe fracture, right        Review of patient's allergies indicates:   Allergen Reactions    Amoxicillin-pot clavulanate      Other reaction(s): CONSTIPATION    Hydrocodone-acetaminophen Other (See Comments)     Other reaction(s): irritable  Other reaction(s): aggressive    Meperidine Other (See Comments)     Other reaction(s): irritability  Other reaction(s): aggressiveness    Propoxyphene n-acetaminophen      Other reaction(s): irritable  Other reaction(s): aggressive    Sulfa (sulfonamide antibiotics)      Other reaction(s): Rash         Current Outpatient Medications:     amLODIPine (NORVASC) 10 MG tablet, Take 1 tablet (10 mg total) by mouth once daily., Disp: 90 tablet, Rfl: 3    atorvastatin (LIPITOR) 80 MG tablet, Take 1 tablet (80 mg total) by mouth once daily., Disp: 90 tablet, Rfl: 3    baclofen (LIORESAL) 10 MG tablet, TAKE 1 TABLET BY MOUTH EVERY DAY, Disp: 90 tablet, Rfl: 2    clopidogreL (PLAVIX) 75 mg tablet, Take 1 tablet (75 mg total) by mouth once daily., Disp: 90 tablet, Rfl: 3    esomeprazole (NEXIUM) 20 MG capsule, Take 1 capsule (20 mg total) by mouth once daily. Patient takes OTC nexium, Disp: 90 capsule, Rfl: 3    ferrous sulfate 324 mg  (65 mg iron) TbEC, Take 1 tablet (324 mg total) by mouth once daily., Disp: 90 tablet, Rfl: 3    hydroCHLOROthiazide (HYDRODIURIL) 25 MG tablet, Take 1 tablet (25 mg total) by mouth once daily., Disp: 90 tablet, Rfl: 3    ibandronate (BONIVA) 150 mg tablet, Take 1 tablet (150 mg total) by mouth every 30 days., Disp: 3 tablet, Rfl: 3    irbesartan (AVAPRO) 300 MG tablet, Take 1 tablet (300 mg total) by mouth every evening., Disp: 90 tablet, Rfl: 3    levothyroxine (SYNTHROID) 50 MCG tablet, Take 1 tablet (50 mcg total) by mouth before breakfast., Disp: 90 tablet, Rfl: 3    magnesium oxide (MAG-OXIDE) 400 mg (241.3 mg magnesium) tablet, Take 1 tablet (400 mg total) by mouth once daily. Every day, Disp: 90 tablet, Rfl: 3    melatonin 3 mg Tab, Take 3 mg by mouth nightly., Disp: , Rfl:     memantine (NAMENDA) 10 MG Tab, Take 1 tablet (10 mg total) by mouth 2 (two) times daily., Disp: 180 tablet, Rfl: 3    metFORMIN (GLUCOPHAGE) 500 MG tablet, Take 1 tablet (500 mg total) by mouth 2 (two) times daily with meals., Disp: 180 tablet, Rfl: 3    MULTIVITAMIN ORAL, Take 1 Package by mouth once daily. Pt takes Diabetic Vitamin Pack, Disp: , Rfl:     nitroGLYCERIN (NITROSTAT) 0.4 MG SL tablet, Place 1 tablet (0.4 mg total) under the tongue every 5 (five) minutes as needed for Chest pain. As directed PRN (Patient taking differently: Place 0.4 mg under the tongue every 5 (five) minutes as needed for Chest pain. As directed for chest pain.  Seek medical help if pain is not relieved by the third dose.), Disp: 100 tablet, Rfl: 3    potassium chloride (MICRO-K) 10 MEQ CpSR, Take 1 capsule (10 mEq total) by mouth once daily., Disp: 90 capsule, Rfl: 3    rivastigmine tartrate (EXELON) 6 mg capsule, Take 1 capsule (6 mg total) by mouth 2 (two) times daily., Disp: 180 capsule, Rfl: 3    sertraline (ZOLOFT) 100 MG tablet, Take 1 tablet (100 mg total) by mouth once daily., Disp: 90 tablet, Rfl: 3    turmeric root extract 500 mg Cap, Take 1  "capsule by mouth 3 (three) times daily. , Disp: , Rfl:     vitamin D 1000 units Tab, Take 1,000 Units by mouth once daily. , Disp: , Rfl:     Review of Systems   Constitutional:  Negative for unexpected weight change.   HENT:  Negative for trouble swallowing.    Eyes:  Negative for visual disturbance.   Respiratory:  Negative for shortness of breath.    Cardiovascular:  Negative for chest pain, palpitations and leg swelling.   Gastrointestinal:  Negative for blood in stool.   Genitourinary:  Negative for hematuria.   Skin:  Negative for rash.   Allergic/Immunologic: Negative for immunocompromised state.   Neurological:  Negative for headaches.   Hematological:  Does not bruise/bleed easily.   Psychiatric/Behavioral:  Negative for agitation. The patient is not nervous/anxious.        Objective:      BP (!) 138/58 (BP Location: Right arm, Patient Position: Sitting, BP Method: Small (Manual))   Pulse 72   Temp 98.9 °F (37.2 °C) (Oral)   Ht 5' 4" (1.626 m)   Wt 108.4 kg (239 lb)   SpO2 96%   BMI 41.02 kg/m²   Physical Exam  Constitutional:       Appearance: She is well-developed.   HENT:      Head: Normocephalic.   Cardiovascular:      Rate and Rhythm: Normal rate and regular rhythm.      Heart sounds: Normal heart sounds.   Pulmonary:      Effort: Pulmonary effort is normal.   Musculoskeletal:         General: Normal range of motion.   Skin:     General: Skin is warm and dry.   Neurological:      Mental Status: She is alert and oriented to person, place, and time.   Psychiatric:         Behavior: Behavior normal.         Thought Content: Thought content normal.         Judgment: Judgment normal.         Assessment:       1. Type 2 diabetes mellitus with hyperglycemia, without long-term current use of insulin    2. Hypertension associated with diabetes    3. Hypothyroidism due to acquired atrophy of thyroid    4. Hyperlipidemia with target LDL less than 70    5. Depression with anxiety    6. Obesity, morbid, BMI " 40.0-49.9        Plan:       Type 2 diabetes mellitus with hyperglycemia, without long-term current use of insulin  -     Ambulatory referral/consult to Optometry; Future; Expected date: 08/09/2023  -     Hemoglobin A1C; Future; Expected date: 08/02/2023  Stable, continue management  Follow the ADA diet  Hemoglobin A1C   Date Value Ref Range Status   06/28/2023 5.4 4.0 - 5.6 % Final     Comment:     ADA Screening Guidelines:  5.7-6.4%  Consistent with prediabetes  >or=6.5%  Consistent with diabetes    High levels of fetal hemoglobin interfere with the HbA1C  assay. Heterozygous hemoglobin variants (HbS, HgC, etc)do  not significantly interfere with this assay.   However, presence of multiple variants may affect accuracy.     12/21/2022 5.6 4.0 - 5.6 % Final     Comment:     ADA Screening Guidelines:  5.7-6.4%  Consistent with prediabetes  >or=6.5%  Consistent with diabetes    High levels of fetal hemoglobin interfere with the HbA1C  assay. Heterozygous hemoglobin variants (HbS, HgC, etc)do  not significantly interfere with this assay.   However, presence of multiple variants may affect accuracy.     12/08/2021 5.7 (H) 4.0 - 5.6 % Final     Comment:     ADA Screening Guidelines:  5.7-6.4%  Consistent with prediabetes  >or=6.5%  Consistent with diabetes    High levels of fetal hemoglobin interfere with the HbA1C  assay. Heterozygous hemoglobin variants (HbS, HgC, etc)do  not significantly interfere with this assay.   However, presence of multiple variants may affect accuracy.        Hypertension associated with diabetes  -     CBC W/ AUTO DIFFERENTIAL; Future; Expected date: 08/02/2023  -     COMPREHENSIVE METABOLIC PANEL; Future; Expected date: 08/02/2023  -     Lipid Panel; Future; Expected date: 08/02/2023  Stable, continue medicaitons  Low sodium diet  BP Readings from Last 3 Encounters:   08/02/23 (!) 138/58   12/07/22 130/60   04/17/22 (!) 141/66      Hypothyroidism due to acquired atrophy of thyroid  -     T4,  "Free; Future; Expected date: 08/02/2023  -     TSH; Future; Expected date: 08/02/2023  Stable, continue   Hyperlipidemia with target LDL less than 70  -     CBC W/ AUTO DIFFERENTIAL; Future; Expected date: 08/02/2023  -     COMPREHENSIVE METABOLIC PANEL; Future; Expected date: 08/02/2023  -     Lipid Panel; Future; Expected date: 08/02/2023  Stable, on Zoloft  Depression with anxiety  Stable, continue management  Obesity, morbid, BMI 40.0-49.9  Counseled patient on his ideal body weight, health consequences of being obese and current recommendations including weekly exercise and a heart healthy diet.  Current BMI is:Estimated body mass index is 41.02 kg/m² as calculated from the following:    Height as of this encounter: 5' 4" (1.626 m).    Weight as of this encounter: 108.4 kg (239 lb)..  Patient is aware that ideal BMI < 25 or Weight in (lb) to have BMI = 25: 145.3.           Patient readiness: acceptance and barriers:none    During the course of the visit the patient was educated and counseled about the following:     Diabetes:  Discussed general issues about diabetes pathophysiology and management.  Addressed ADA diet.  Encouraged aerobic exercise.  Hypertension:   Dietary sodium restriction.  Regular aerobic exercise.  Obesity:   General weight loss/lifestyle modification strategies discussed (elicit support from others; identify saboteurs; non-food rewards, etc).  Informal exercise measures discussed, e.g. taking stairs instead of elevator.  Regular aerobic exercise program discussed.    Goals: Diabetes: Maintain Hemoglobin A1C below 7, Hypertension: Reduce Blood Pressure, and Obesity: Reduce calorie intake and BMI    Did patient meet goals/outcomes: Yes    The following self management tools provided: declined    Patient Instructions (the written plan) was given to the patient/family.     Time spent with patient: 30 minutes    Barriers to medications present (no )    Adverse reactions to current medications " (no)    Over the counter medications reviewed (Yes)

## 2023-08-03 ENCOUNTER — TELEPHONE (OUTPATIENT)
Dept: FAMILY MEDICINE | Facility: CLINIC | Age: 83
End: 2023-08-03
Payer: MEDICARE

## 2023-08-07 ENCOUNTER — TELEPHONE (OUTPATIENT)
Dept: ADMINISTRATIVE | Facility: CLINIC | Age: 83
End: 2023-08-07
Payer: MEDICARE

## 2023-08-09 ENCOUNTER — OFFICE VISIT (OUTPATIENT)
Dept: HOME HEALTH SERVICES | Facility: CLINIC | Age: 83
End: 2023-08-09
Payer: MEDICARE

## 2023-08-09 ENCOUNTER — TELEPHONE (OUTPATIENT)
Dept: ADMINISTRATIVE | Facility: CLINIC | Age: 83
End: 2023-08-09
Payer: MEDICARE

## 2023-08-09 VITALS — WEIGHT: 239 LBS | BODY MASS INDEX: 40.8 KG/M2 | HEIGHT: 64 IN

## 2023-08-09 DIAGNOSIS — E78.5 HYPERLIPIDEMIA WITH TARGET LDL LESS THAN 70: ICD-10-CM

## 2023-08-09 DIAGNOSIS — E03.4 HYPOTHYROIDISM DUE TO ACQUIRED ATROPHY OF THYROID: ICD-10-CM

## 2023-08-09 DIAGNOSIS — E11.9 CONTROLLED TYPE 2 DIABETES MELLITUS WITHOUT COMPLICATION, WITHOUT LONG-TERM CURRENT USE OF INSULIN: ICD-10-CM

## 2023-08-09 DIAGNOSIS — E11.59 HYPERTENSION ASSOCIATED WITH DIABETES: ICD-10-CM

## 2023-08-09 DIAGNOSIS — F03.90 DEMENTIA WITHOUT BEHAVIORAL DISTURBANCE: ICD-10-CM

## 2023-08-09 DIAGNOSIS — R26.9 ABNORMALITY OF GAIT AND MOBILITY: ICD-10-CM

## 2023-08-09 DIAGNOSIS — Z99.89 DEPENDENCE ON OTHER ENABLING MACHINES AND DEVICES: ICD-10-CM

## 2023-08-09 DIAGNOSIS — Z00.00 ENCOUNTER FOR PREVENTIVE HEALTH EXAMINATION: Primary | ICD-10-CM

## 2023-08-09 DIAGNOSIS — M81.0 OSTEOPOROSIS, UNSPECIFIED OSTEOPOROSIS TYPE, UNSPECIFIED PATHOLOGICAL FRACTURE PRESENCE: ICD-10-CM

## 2023-08-09 DIAGNOSIS — E66.01 MORBID (SEVERE) OBESITY DUE TO EXCESS CALORIES: ICD-10-CM

## 2023-08-09 DIAGNOSIS — I51.89 DIASTOLIC DYSFUNCTION: ICD-10-CM

## 2023-08-09 DIAGNOSIS — F33.1 MODERATE EPISODE OF RECURRENT MAJOR DEPRESSIVE DISORDER: ICD-10-CM

## 2023-08-09 DIAGNOSIS — M15.9 PRIMARY OSTEOARTHRITIS INVOLVING MULTIPLE JOINTS: ICD-10-CM

## 2023-08-09 DIAGNOSIS — I25.118 ATHEROSCLEROTIC HEART DISEASE OF NATIVE CORONARY ARTERY WITH OTHER FORMS OF ANGINA PECTORIS: ICD-10-CM

## 2023-08-09 DIAGNOSIS — I15.2 HYPERTENSION ASSOCIATED WITH DIABETES: ICD-10-CM

## 2023-08-09 DIAGNOSIS — N18.31 CHRONIC KIDNEY DISEASE, STAGE 3A: ICD-10-CM

## 2023-08-09 DIAGNOSIS — I77.1 TORTUOUS AORTA: ICD-10-CM

## 2023-08-09 DIAGNOSIS — K21.9 GASTROESOPHAGEAL REFLUX DISEASE, UNSPECIFIED WHETHER ESOPHAGITIS PRESENT: ICD-10-CM

## 2023-08-09 DIAGNOSIS — G89.4 CHRONIC PAIN SYNDROME: ICD-10-CM

## 2023-08-09 DIAGNOSIS — E27.8 ADRENAL NODULE: ICD-10-CM

## 2023-08-09 PROCEDURE — 1125F PR PAIN SEVERITY QUANTIFIED, PAIN PRESENT: ICD-10-PCS | Mod: CPTII,95,, | Performed by: NURSE PRACTITIONER

## 2023-08-09 PROCEDURE — 1159F MED LIST DOCD IN RCRD: CPT | Mod: CPTII,95,, | Performed by: NURSE PRACTITIONER

## 2023-08-09 PROCEDURE — 1157F ADVNC CARE PLAN IN RCRD: CPT | Mod: CPTII,95,, | Performed by: NURSE PRACTITIONER

## 2023-08-09 PROCEDURE — 1125F AMNT PAIN NOTED PAIN PRSNT: CPT | Mod: CPTII,95,, | Performed by: NURSE PRACTITIONER

## 2023-08-09 PROCEDURE — G0439 PR MEDICARE ANNUAL WELLNESS SUBSEQUENT VISIT: ICD-10-PCS | Mod: 95,,, | Performed by: NURSE PRACTITIONER

## 2023-08-09 PROCEDURE — 1159F PR MEDICATION LIST DOCUMENTED IN MEDICAL RECORD: ICD-10-PCS | Mod: CPTII,95,, | Performed by: NURSE PRACTITIONER

## 2023-08-09 PROCEDURE — 1160F RVW MEDS BY RX/DR IN RCRD: CPT | Mod: CPTII,95,, | Performed by: NURSE PRACTITIONER

## 2023-08-09 PROCEDURE — 1100F PTFALLS ASSESS-DOCD GE2>/YR: CPT | Mod: CPTII,95,, | Performed by: NURSE PRACTITIONER

## 2023-08-09 PROCEDURE — 1157F PR ADVANCE CARE PLAN OR EQUIV PRESENT IN MEDICAL RECORD: ICD-10-PCS | Mod: CPTII,95,, | Performed by: NURSE PRACTITIONER

## 2023-08-09 PROCEDURE — G0439 PPPS, SUBSEQ VISIT: HCPCS | Mod: 95,,, | Performed by: NURSE PRACTITIONER

## 2023-08-09 PROCEDURE — 3288F PR FALLS RISK ASSESSMENT DOCUMENTED: ICD-10-PCS | Mod: CPTII,95,, | Performed by: NURSE PRACTITIONER

## 2023-08-09 PROCEDURE — 3288F FALL RISK ASSESSMENT DOCD: CPT | Mod: CPTII,95,, | Performed by: NURSE PRACTITIONER

## 2023-08-09 PROCEDURE — 1100F PR PT FALLS ASSESS DOC 2+ FALLS/FALL W/INJURY/YR: ICD-10-PCS | Mod: CPTII,95,, | Performed by: NURSE PRACTITIONER

## 2023-08-09 PROCEDURE — 1160F PR REVIEW ALL MEDS BY PRESCRIBER/CLIN PHARMACIST DOCUMENTED: ICD-10-PCS | Mod: CPTII,95,, | Performed by: NURSE PRACTITIONER

## 2023-08-09 NOTE — PROGRESS NOTES
"The patient location is: [place]  The chief complaint leading to consultation is: Health Risk Assessment    Visit type: audiovisual    Face to Face time with patient: 25  40 minutes of total time spent on the encounter, which includes face to face time and non-face to face time preparing to see the patient (eg, review of tests), Obtaining and/or reviewing separately obtained history, Documenting clinical information in the electronic or other health record, Independently interpreting results (not separately reported) and communicating results to the patient/family/caregiver, or Care coordination (not separately reported).         Each patient to whom he or she provides medical services by telemedicine is:  (1) informed of the relationship between the physician and patient and the respective role of any other health care provider with respect to management of the patient; and (2) notified that he or she may decline to receive medical services by telemedicine and may withdraw from such care at any time.    Notes:       Jerica Rios presented for a  Medicare AWV and comprehensive Health Risk Assessment today. The following components were reviewed and updated:    Medical history  Family History  Social history  Allergies and Current Medications  Health Risk Assessment  Health Maintenance  Care Team         ** See Completed Assessments for Annual Wellness Visit within the encounter summary.**         The following assessments were completed:  Living Situation  CAGE  Depression Screening  Fall Risk Assessment (MACH 10)  Hearing Assessment(HHI)  Cognitive Function Screening  Nutrition Screening  ADL Screening  PAQ Screening      Vitals:    08/09/23 1158   Weight: 108.4 kg (239 lb)   Height: 5' 4" (1.626 m)     Body mass index is 41.02 kg/m².  Physical Exam  Constitutional:       Appearance: She is obese.   Neurological:      Mental Status: She is alert. Mental status is at baseline.   Psychiatric:         Mood and " Affect: Mood normal.         Behavior: Behavior normal.               Diagnoses and health risks identified today and associated recommendations/orders:    1. Encounter for preventive health examination  Assessments completed. Preventive measures and health maintenance reviewed with patient.  Review for opioid screening: Patient does not have any prescriptions for opioids.  Review for substance use disorder: Patient does not use any substances.    2. Morbid (severe) obesity due to excess calories  Stable, followed by PCP. Healthy diet and tolerable exercises encouraged.    3. Dementia without behavioral disturbance  Stable, patient on Namenda and Exelon. Followed by PCP.    4. Controlled type 2 diabetes mellitus without complication, without long-term current use of insulin  Stable, patient on Metformin. Followed by PCP.    5. Chronic kidney disease, stage 3a  Stable, followed by PCP.    6. Atherosclerotic heart disease of native coronary artery with other forms of angina pectoris  Stable, patient has Nitro prn. Followed by PCP.    7. Tortuous aorta  Stable, patient on Lipitor. Followed by PCP.    8. Diastolic dysfunction  Stable, followed by PCP.    9. Moderate episode of recurrent major depressive disorder  Stable, patient on Zoloft. Followed by PCP.    10. Adrenal nodule  Stable, followed by PCP.    11. Hypertension associated with diabetes  Stable, patient on Amlodipine and Hydrochlorothiazide. Followed by PCP.    12. Hyperlipidemia with target LDL less than 70, baseline LDL not available  Stable, followed by Lipitor. Followed by PCP.    13. Chronic pain syndrome  Stable, patient on Baclofen. Followed by PCP.    14. Primary osteoarthritis involving multiple joints  Stable, followed by PCP.    15. Hypothyroidism due to acquired atrophy of thyroid  Stable, patient on Synthroid. Followed by PCP.    16. Osteoporosis, unspecified osteoporosis type, unspecified pathological fracture presence  Stable, patient on Boniva  and Vitamin D. Followed by PCP.    17. Gastroesophageal reflux disease, unspecified whether esophagitis present  Stable, patient on Nexium. Followed by PCP.    18. Abnormality of gait and mobility  Stable, patient has walker for mobility. Followed by PCP.    19. Dependence on other enabling machines and devices  Stable, followed by PCP.      Provided Jerica with a 5-10 year written screening schedule and personal prevention plan. Recommendations were developed using the USPSTF age appropriate recommendations. Education, counseling, and referrals were provided as needed. After Visit Summary printed and given to patient which includes a list of additional screenings\tests needed.    Follow up in about 1 year (around 8/9/2024) for your next annual wellness visit.    Miladys Woody, CARMEN  I offered to discuss advanced care planning, including how to pick a person who would make decisions for you if you were unable to make them for yourself, called a health care power of , and what kind of decisions you might make such as use of life sustaining treatments such as ventilators and tube feeding when faced with a life limiting illness recorded on a living will that they will need to know. (How you want to be cared for as you near the end of your natural life)     X  Patient has advanced directives on file, which we reviewed, and they do not wish to make changes.

## 2023-08-09 NOTE — PATIENT INSTRUCTIONS
Counseling and Referral of Other Preventative  (Italic type indicates deductible and co-insurance are waived)    Patient Name: Jerica Rios  Today's Date: 8/9/2023    Health Maintenance       Date Due Completion Date    COVID-19 Vaccine (1) Never done ---    Shingles Vaccine (2 of 3) 09/16/2011 7/22/2011    Eye Exam 09/28/2017 9/28/2016    Override on 8/24/2016: Done (Dr Leah Bauer   report sent to scanning   kb)    Override on 3/30/2016: Done ([facility] 20/20 sent scanning)    Diabetes Urine Screening 11/15/2018 11/15/2017    DEXA Scan 11/15/2019 11/15/2017    Influenza Vaccine (1) 09/01/2023 12/7/2022    Hemoglobin A1c 12/28/2023 6/28/2023    Lipid Panel 06/28/2024 6/28/2023    Aspirin/Antiplatelet Therapy 08/02/2024 8/2/2023    TETANUS VACCINE 09/02/2025 9/2/2015        No orders of the defined types were placed in this encounter.    The following information is provided to all patients.  This information is to help you find resources for any of the problems found today that may be affecting your health:                Living healthy guide: www.[facility]placed [facility].[place].gov      Understanding Diabetes: www.diabetes.org      Eating healthy: www.cdc.gov/healthyweight      CDC home safety checklist: www.cdc.gov/steadi/patient.html      Agency on Aging: www.goea.[place].[facility]      Alcoholics anonymous (AA): www.aa.org      Physical Activity: www.matteo.nih.gov/tu4uaji      Tobacco use: www.quitwithusla.org

## 2023-09-18 ENCOUNTER — TELEPHONE (OUTPATIENT)
Dept: FAMILY MEDICINE | Facility: CLINIC | Age: 83
End: 2023-09-18
Payer: MEDICARE

## 2023-09-18 NOTE — TELEPHONE ENCOUNTER
Called and spoke to pt's son about setting up Eye appt p/Referral  Declines for now but will call back when he can set up the appt

## 2023-09-23 DIAGNOSIS — M81.0 OSTEOPOROSIS, UNSPECIFIED OSTEOPOROSIS TYPE, UNSPECIFIED PATHOLOGICAL FRACTURE PRESENCE: ICD-10-CM

## 2023-09-23 DIAGNOSIS — F03.90 MAJOR NEUROCOGNITIVE DISORDER: ICD-10-CM

## 2023-09-25 RX ORDER — IBANDRONATE SODIUM 150 MG/1
150 TABLET, FILM COATED ORAL
Qty: 3 TABLET | Refills: 3 | Status: SHIPPED | OUTPATIENT
Start: 2023-09-25

## 2023-09-25 RX ORDER — RIVASTIGMINE TARTRATE 6 MG/1
6 CAPSULE ORAL 2 TIMES DAILY
Qty: 180 CAPSULE | Refills: 3 | Status: SHIPPED | OUTPATIENT
Start: 2023-09-25 | End: 2024-09-24

## 2023-09-25 NOTE — TELEPHONE ENCOUNTER
Refill Routing Note     Refill Routing Note   Medication(s) are not appropriate for processing by [facility] for the following reason(s):      Medication outside of protocol    [facility] action(s):  Route Care Due:  None identified            Appointments  past 12m or future 3m with PCP    Date Provider   Last Visit   12/7/2022 Candace Davis MD   Next Visit   2/14/2024 Candace Davis MD   ED visits in past 90 days: 0        Note composed:12:21 PM 09/25/2023

## 2023-12-11 NOTE — TELEPHONE ENCOUNTER
2-6% chance of having an allergic reaction to a penicillin antibiotic    Does not predict for nonallergic reactions    Clauvae (Augmentin still not an option)   Refill request rec'd and [facility].

## 2023-12-18 DIAGNOSIS — I15.2 HYPERTENSION ASSOCIATED WITH DIABETES: ICD-10-CM

## 2023-12-18 DIAGNOSIS — E03.9 HYPOTHYROIDISM: ICD-10-CM

## 2023-12-18 DIAGNOSIS — E11.59 HYPERTENSION ASSOCIATED WITH DIABETES: ICD-10-CM

## 2023-12-18 RX ORDER — LEVOTHYROXINE SODIUM 50 UG/1
50 TABLET ORAL
Qty: 90 TABLET | Refills: 0 | Status: SHIPPED | OUTPATIENT
Start: 2023-12-18 | End: 2024-01-03 | Stop reason: SDUPTHER

## 2023-12-18 RX ORDER — AMLODIPINE BESYLATE 10 MG/1
10 TABLET ORAL
Qty: 90 TABLET | Refills: 0 | Status: SHIPPED | OUTPATIENT
Start: 2023-12-18

## 2023-12-18 RX ORDER — HYDROGEN PEROXIDE 3 %
20 SOLUTION, NON-ORAL MISCELLANEOUS DAILY
Qty: 90 CAPSULE | Refills: 0 | Status: SHIPPED | OUTPATIENT
Start: 2023-12-18

## 2023-12-18 NOTE — TELEPHONE ENCOUNTER
No care due was identified.  [facility] Embedded Care Due Messages. Reference number: [id number].   12/18/2023 4:37:07 PM CST

## 2023-12-18 NOTE — TELEPHONE ENCOUNTER
No care due was identified.  [facility] Embedded Care Due Messages. Reference number: [id number].   12/18/2023 3:58:53 PM CST

## 2023-12-18 NOTE — TELEPHONE ENCOUNTER
Care Due:                  Date            Visit Type   Department     Provider  --------------------------------------------------------------------------------                                EP -                              PRIMARY      [facility]  Last Visit: 12-      CARE ([facility])   MEDICINE       Candace Davis  Next Visit: None Scheduled  None         None Found                                                            Last  Test          Frequency    Reason                     Performed    Due Date  --------------------------------------------------------------------------------    Office Visit  12 months..  amLODIPine, atorvastatin,   12- 12-                             clopidogreL,                             esomeprazole,                             hydroCHLOROthiazide,                             ibandronate, irbesartan,                             levothyroxine, magnesium,                             metFORMIN, potassium,                             sertraline...............    HBA1C.......  6 months...  metFORMIN................  06- 12-    Mg Level....  12 months..  ibandronate, magnesium...  12- 12-    Phosphate...  12 months..  ibandronate..............  Not Found    Overdue    Vitamin D...  12 months..  ibandronate..............  Not Found    Overdue    [employer] Embedded Care Due Messages. Reference number: [id number].   12/18/2023 3:39:00 PM CST

## 2023-12-18 NOTE — TELEPHONE ENCOUNTER
Care Due:                  Date            Visit Type   Department     Provider  --------------------------------------------------------------------------------                                EP -                              PRIMARY      [facility]  Last Visit: 12-      CARE ([facility])   MEDICINE       Candace Davis  Next Visit: None Scheduled  None         None Found                                                            Last  Test          Frequency    Reason                     Performed    Due Date  --------------------------------------------------------------------------------    Office Visit  12 months..  amLODIPine, atorvastatin,   12- 12-                             clopidogreL,                             esomeprazole,                             hydroCHLOROthiazide,                             ibandronate, irbesartan,                             levothyroxine, magnesium,                             metFORMIN, potassium,                             sertraline...............    HBA1C.......  6 months...  metFORMIN................  06- 12-    Mg Level....  12 months..  ibandronate, magnesium...  12- 12-    Phosphate...  12 months..  ibandronate..............  Not Found    Overdue    Vitamin D...  12 months..  ibandronate..............  Not Found    Overdue    [employer] Embedded Care Due Messages. Reference number: [id number].   12/18/2023 3:38:51 PM CST

## 2023-12-18 NOTE — TELEPHONE ENCOUNTER
Refill Routing Note   Medication(s) are not appropriate for processing by [facility] for the following reason(s):        Outside of protocol    [facility] action(s):  Route  Approve      Medication Therapy Plan: Patient has been on Esomeprazole and Clopidogrel >12 months      Appointments  past 12m or future 3m with PCP    Date Provider   Last Visit   12/7/2022 Candace Davis MD   Next Visit   2/14/2024 Candace Davis MD   ED visits in past 90 days: 0        Note composed:3:29 AM 12/18/2023

## 2023-12-19 RX ORDER — LANOLIN ALCOHOL/MO/W.PET/CERES
400 CREAM (GRAM) TOPICAL DAILY
Qty: 90 TABLET | Refills: 3 | Status: SHIPPED | OUTPATIENT
Start: 2023-12-19

## 2023-12-19 RX ORDER — FERROUS SULFATE 324(65)MG
325 TABLET, DELAYED RELEASE (ENTERIC COATED) ORAL DAILY
Qty: 90 TABLET | Refills: 3 | Status: SHIPPED | OUTPATIENT
Start: 2023-12-19

## 2024-01-02 DIAGNOSIS — F41.8 DEPRESSION WITH ANXIETY: ICD-10-CM

## 2024-01-02 RX ORDER — ATORVASTATIN CALCIUM 80 MG/1
80 TABLET, FILM COATED ORAL DAILY
Qty: 90 TABLET | Refills: 3 | Status: SHIPPED | OUTPATIENT
Start: 2024-01-02

## 2024-01-02 RX ORDER — SERTRALINE HYDROCHLORIDE 100 MG/1
100 TABLET, FILM COATED ORAL
Qty: 90 TABLET | Refills: 3 | Status: SHIPPED | OUTPATIENT
Start: 2024-01-02

## 2024-01-02 NOTE — TELEPHONE ENCOUNTER
No care due was identified.  [facility] Embedded Care Due Messages. Reference number: [id number].   1/02/2024 12:18:32 AM CST

## 2024-01-03 DIAGNOSIS — E03.9 HYPOTHYROIDISM: ICD-10-CM

## 2024-01-03 RX ORDER — BACLOFEN 10 MG/1
TABLET ORAL
Qty: 90 TABLET | Refills: 2 | OUTPATIENT
Start: 2024-01-03

## 2024-01-03 NOTE — TELEPHONE ENCOUNTER
Spoke with patient's son to advise refill request for baclofen denied and patient would need to make appointment for office visit. He will discuss with patient.

## 2024-01-03 NOTE — TELEPHONE ENCOUNTER
No care due was identified.  [facility] Embedded Care Due Messages. Reference number: [id number].   1/03/2024 4:53:21 PM CST

## 2024-01-04 RX ORDER — LEVOTHYROXINE SODIUM 50 UG/1
50 TABLET ORAL
Qty: 90 TABLET | Refills: 0 | Status: SHIPPED | OUTPATIENT
Start: 2024-01-04

## 2024-01-08 ENCOUNTER — TELEPHONE (OUTPATIENT)
Dept: FAMILY MEDICINE | Facility: CLINIC | Age: 84
End: 2024-01-08
Payer: MEDICARE

## 2024-01-08 NOTE — TELEPHONE ENCOUNTER
----- Message from Merary Watts sent at 1/8/2024  1:59 PM CST -----  Regarding: advise prescp  Contact: jordy mckenna  Type: Needs Medical Advice  Who Called:  jordy zamora  Symptoms (please be specific):  levothyroxine (SYNTHROID) 50 MCG table  How long has patient had these symptoms:    Pharmacy name and phone #:    [id number] [place] - [address]  [address] [address]  Phone: [phone] Fax: [phone]    [facility] [place] Call Back Number: [phone] / [phone]  Additional Information: "[employer] believes the pt is not due until February but pt is due now for her 90 day supply. Call to advise.

## 2024-01-08 NOTE — TELEPHONE ENCOUNTER
Spoke to son, he states that mother had a refill of medication in September for 90 days. States that insurance company will not refill medication until February. Patient wanted us to know this information and states that he will reach out to the patient liaison for her insurance.

## 2024-03-04 RX ORDER — POTASSIUM CHLORIDE 750 MG/1
10 CAPSULE, EXTENDED RELEASE ORAL
Qty: 90 CAPSULE | Refills: 0 | Status: SHIPPED | OUTPATIENT
Start: 2024-03-04

## 2024-03-04 RX ORDER — IRBESARTAN 300 MG/1
300 TABLET ORAL NIGHTLY
Qty: 90 TABLET | Refills: 0 | Status: SHIPPED | OUTPATIENT
Start: 2024-03-04 | End: 2024-04-22

## 2024-03-04 RX ORDER — HYDROCHLOROTHIAZIDE 25 MG/1
25 TABLET ORAL
Qty: 90 TABLET | Refills: 0 | Status: SHIPPED | OUTPATIENT
Start: 2024-03-04 | End: 2024-04-22

## 2024-03-04 NOTE — TELEPHONE ENCOUNTER
Care Due:                  Date            Visit Type   Department     Provider  --------------------------------------------------------------------------------                                EP -                              PRIMARY      [facility]  Last Visit: 12-      CARE ([facility])   MEDICINE       Candace Davis  Next Visit: None Scheduled  None         None Found                                                            Last  Test          Frequency    Reason                     Performed    Due Date  --------------------------------------------------------------------------------    Office Visit  12 months..  amLODIPine, atorvastatin,   12- 12-                             clopidogreL,                             esomeprazole,                             hydroCHLOROthiazide,                             ibandronate, irbesartan,                             levothyroxine, magnesium,                             metFORMIN, potassium,                             sertraline...............    HBA1C.......  6 months...  metFORMIN................  06- 12-    Mg Level....  12 months..  ibandronate, magnesium...  12- 12-    Phosphate...  12 months..  ibandronate..............  Not Found    Overdue    Vitamin D...  12 months..  ibandronate..............  Not Found    Overdue    [employer] Embedded Care Due Messages. Reference number: [id number].   3/04/2024 6:11:08 AM CST

## 2024-03-06 ENCOUNTER — LAB VISIT (OUTPATIENT)
Dept: LAB | Facility: HOSPITAL | Age: 84
End: 2024-03-06
Attending: NURSE PRACTITIONER
Payer: MEDICARE

## 2024-03-06 DIAGNOSIS — E78.5 HYPERLIPIDEMIA WITH TARGET LDL LESS THAN 70: ICD-10-CM

## 2024-03-06 DIAGNOSIS — E11.59 HYPERTENSION ASSOCIATED WITH DIABETES: ICD-10-CM

## 2024-03-06 DIAGNOSIS — E03.4 HYPOTHYROIDISM DUE TO ACQUIRED ATROPHY OF THYROID: ICD-10-CM

## 2024-03-06 DIAGNOSIS — I15.2 HYPERTENSION ASSOCIATED WITH DIABETES: ICD-10-CM

## 2024-03-06 DIAGNOSIS — E11.65 TYPE 2 DIABETES MELLITUS WITH HYPERGLYCEMIA, WITHOUT LONG-TERM CURRENT USE OF INSULIN: ICD-10-CM

## 2024-03-06 LAB
ALBUMIN SERPL BCP-MCNC: 4.2 G/DL (ref 3.5–5.2)
ALP SERPL-CCNC: 105 U/L (ref 55–135)
ALT SERPL W/O P-5'-P-CCNC: 23 U/L (ref 10–44)
ANION GAP SERPL CALC-SCNC: 12 MMOL/L (ref 8–16)
AST SERPL-CCNC: 19 U/L (ref 10–40)
BASOPHILS # BLD AUTO: 0.06 K/UL (ref 0–0.2)
BASOPHILS NFR BLD: 0.6 % (ref 0–1.9)
BILIRUB SERPL-MCNC: 0.4 MG/DL (ref 0.1–1)
BUN SERPL-MCNC: 23 MG/DL (ref 8–23)
CALCIUM SERPL-MCNC: 10.2 MG/DL (ref 8.7–10.5)
CHLORIDE SERPL-SCNC: 107 MMOL/L (ref 95–110)
CHOLEST SERPL-MCNC: 114 MG/DL (ref 120–199)
CHOLEST/HDLC SERPL: 2.8 {RATIO} (ref 2–5)
CO2 SERPL-SCNC: 24 MMOL/L (ref 23–29)
CREAT SERPL-MCNC: 1.2 MG/DL (ref 0.5–1.4)
DIFFERENTIAL METHOD BLD: ABNORMAL
EOSINOPHIL # BLD AUTO: 0.3 K/UL (ref 0–0.5)
EOSINOPHIL NFR BLD: 3.3 % (ref 0–8)
ERYTHROCYTE [DISTWIDTH] IN BLOOD BY AUTOMATED COUNT: 15.6 % (ref 11.5–14.5)
EST. GFR  (NO RACE VARIABLE): 44.6 ML/MIN/1.73 M^2
ESTIMATED AVG GLUCOSE: 114 MG/DL (ref 68–131)
GLUCOSE SERPL-MCNC: 96 MG/DL (ref 70–110)
HBA1C MFR BLD: 5.6 % (ref 4–5.6)
HCT VFR BLD AUTO: 35.6 % (ref 37–48.5)
HDLC SERPL-MCNC: 41 MG/DL (ref 40–75)
HDLC SERPL: 36 % (ref 20–50)
HGB BLD-MCNC: 11.7 G/DL (ref 12–16)
IMM GRANULOCYTES # BLD AUTO: 0.04 K/UL (ref 0–0.04)
IMM GRANULOCYTES NFR BLD AUTO: 0.4 % (ref 0–0.5)
LDLC SERPL CALC-MCNC: 55.8 MG/DL (ref 63–159)
LYMPHOCYTES # BLD AUTO: 2.3 K/UL (ref 1–4.8)
LYMPHOCYTES NFR BLD: 23.2 % (ref 18–48)
MCH RBC QN AUTO: 30.1 PG (ref 27–31)
MCHC RBC AUTO-ENTMCNC: 32.9 G/DL (ref 32–36)
MCV RBC AUTO: 92 FL (ref 82–98)
MONOCYTES # BLD AUTO: 0.7 K/UL (ref 0.3–1)
MONOCYTES NFR BLD: 7.5 % (ref 4–15)
NEUTROPHILS # BLD AUTO: 6.3 K/UL (ref 1.8–7.7)
NEUTROPHILS NFR BLD: 65 % (ref 38–73)
NONHDLC SERPL-MCNC: 73 MG/DL
NRBC BLD-RTO: 0 /100 WBC
PLATELET # BLD AUTO: 398 K/UL (ref 150–450)
PMV BLD AUTO: 10.7 FL (ref 9.2–12.9)
POTASSIUM SERPL-SCNC: 4.1 MMOL/L (ref 3.5–5.1)
PROT SERPL-MCNC: 7.7 G/DL (ref 6–8.4)
RBC # BLD AUTO: 3.89 M/UL (ref 4–5.4)
SODIUM SERPL-SCNC: 143 MMOL/L (ref 136–145)
T4 FREE SERPL-MCNC: 0.92 NG/DL (ref 0.71–1.51)
TRIGL SERPL-MCNC: 86 MG/DL (ref 30–150)
TSH SERPL DL<=0.005 MIU/L-ACNC: 1.7 UIU/ML (ref 0.4–4)
WBC # BLD AUTO: 9.73 K/UL (ref 3.9–12.7)

## 2024-03-06 PROCEDURE — 80061 LIPID PANEL: CPT | Performed by: NURSE PRACTITIONER

## 2024-03-06 PROCEDURE — 83036 HEMOGLOBIN GLYCOSYLATED A1C: CPT | Performed by: NURSE PRACTITIONER

## 2024-03-06 PROCEDURE — 84443 ASSAY THYROID STIM HORMONE: CPT | Performed by: NURSE PRACTITIONER

## 2024-03-06 PROCEDURE — 85025 COMPLETE CBC W/AUTO DIFF WBC: CPT | Performed by: NURSE PRACTITIONER

## 2024-03-06 PROCEDURE — 80053 COMPREHEN METABOLIC PANEL: CPT | Performed by: NURSE PRACTITIONER

## 2024-03-06 PROCEDURE — 36415 COLL VENOUS BLD VENIPUNCTURE: CPT | Mod: PO | Performed by: NURSE PRACTITIONER

## 2024-03-06 PROCEDURE — 84439 ASSAY OF FREE THYROXINE: CPT | Performed by: NURSE PRACTITIONER

## 2024-03-13 ENCOUNTER — OFFICE VISIT (OUTPATIENT)
Dept: FAMILY MEDICINE | Facility: CLINIC | Age: 84
End: 2024-03-13
Payer: MEDICARE

## 2024-03-13 ENCOUNTER — LAB VISIT (OUTPATIENT)
Dept: LAB | Facility: HOSPITAL | Age: 84
End: 2024-03-13
Attending: FAMILY MEDICINE
Payer: MEDICARE

## 2024-03-13 VITALS
OXYGEN SATURATION: 95 % | BODY MASS INDEX: 36.43 KG/M2 | HEART RATE: 71 BPM | WEIGHT: 213.38 LBS | HEIGHT: 64 IN | DIASTOLIC BLOOD PRESSURE: 62 MMHG | SYSTOLIC BLOOD PRESSURE: 110 MMHG | RESPIRATION RATE: 16 BRPM

## 2024-03-13 DIAGNOSIS — E11.59 HYPERTENSION ASSOCIATED WITH DIABETES: ICD-10-CM

## 2024-03-13 DIAGNOSIS — N18.31 STAGE 3A CHRONIC KIDNEY DISEASE: ICD-10-CM

## 2024-03-13 DIAGNOSIS — E11.65 TYPE 2 DIABETES MELLITUS WITH HYPERGLYCEMIA, WITHOUT LONG-TERM CURRENT USE OF INSULIN: Primary | ICD-10-CM

## 2024-03-13 DIAGNOSIS — I15.2 HYPERTENSION ASSOCIATED WITH DIABETES: ICD-10-CM

## 2024-03-13 PROCEDURE — 87186 SC STD MICRODIL/AGAR DIL: CPT | Performed by: NURSE PRACTITIONER

## 2024-03-13 PROCEDURE — 99214 OFFICE O/P EST MOD 30 MIN: CPT | Mod: S$GLB,,, | Performed by: NURSE PRACTITIONER

## 2024-03-13 PROCEDURE — 36415 COLL VENOUS BLD VENIPUNCTURE: CPT | Mod: PO | Performed by: NURSE PRACTITIONER

## 2024-03-13 PROCEDURE — 81001 URINALYSIS AUTO W/SCOPE: CPT | Performed by: NURSE PRACTITIONER

## 2024-03-13 PROCEDURE — 87088 URINE BACTERIA CULTURE: CPT | Performed by: NURSE PRACTITIONER

## 2024-03-13 PROCEDURE — 99999 PR PBB SHADOW E&M-EST. PATIENT-LVL IV: CPT | Mod: PBBFAC,,, | Performed by: NURSE PRACTITIONER

## 2024-03-13 PROCEDURE — 87077 CULTURE AEROBIC IDENTIFY: CPT | Performed by: NURSE PRACTITIONER

## 2024-03-13 PROCEDURE — 87086 URINE CULTURE/COLONY COUNT: CPT | Performed by: NURSE PRACTITIONER

## 2024-03-13 PROCEDURE — 80048 BASIC METABOLIC PNL TOTAL CA: CPT | Performed by: NURSE PRACTITIONER

## 2024-03-13 NOTE — PROGRESS NOTES
This dictation has been generated using Modal Fluency Dictation some phonetic errors may occur. Please contact author for clarification if needed.     Problem List Items Addressed This Visit       Hypertension associated with diabetes     Other Visit Diagnoses       Type 2 diabetes mellitus with hyperglycemia, without long-term current use of insulin    -  Primary    Relevant Orders    Urinalysis    Urine culture    Stage 3a chronic kidney disease        Relevant Orders    Basic Metabolic Panel    Urinalysis    Urine culture            Orders Placed This Encounter    Urine culture    Basic Metabolic Panel    Urinalysis     Hypertension associated with diabetes blood pressure acceptable.    Chronic kidney disease stage 3 update BMP and re-evaluate renal function.  Recently trended downward.  Urinary urgency check urinalysis and urine culture.    No follow-ups on file.    ________________________________________________________________  ________________________________________________________________      Chief Complaint   Patient presents with    Follow-up    Diabetes     3m f/u     History of present illness  This 84 y.o. presents today for complaint of hypertension chronic kidney disease urinary urgency   Recent visit and labs with renal markers trending in his slightly negative direction.  Discussed that her estimated function is acceptable however had trended downward.  They have been increasing her fluids.  She drinks 1 cup of coffee in the morning.  Her son is with her today and notes that he has been giving her some cranberry juice and encourage her to drink more water which she has been doing.  She does have chronic back pain and other arthralgias.  She stopped taking NSAIDs and has been using Tylenol.  At the end of visit patient noted urge to urinate suddenly.  She has had some urinary urgency.  Denies any dysuria.  Patient denies malodorous urine.  Some odor noted.    Hypertension and diabetes stable and  controlled.  Taking meds as directed without complaint.  Consider diabetes and hypertension contributing to CKD.  Review of systems   No fever chills  No nausea vomiting diarrhea    Past Medical History:   Diagnosis Date    Allergy     Arthritis     Carotid artery disease     Coronary artery disease     Diabetes mellitus     Diabetes mellitus, type 2     Fall at home     GERD (gastroesophageal reflux disease)     Headache(784.0)     Hyperlipidemia     Hypertension     Mental disorder     Obesity     Right rib fracture     Snoring     Toe fracture, right        Past Surgical History:   Procedure Laterality Date    CHOLECYSTECTOMY      CORONARY STENT PLACEMENT      x 5    CYST REMOVAL      sebaceous cyst from back    EYE SURGERY      OVARIAN CYST REMOVAL      SINUS SURGERY      TONSILLECTOMY         Family History   Problem Relation Age of Onset    Heart disease Mother     Diabetes Mother     Cancer Mother         breast cancer    Heart disease Father     Heart disease Sister     Stroke Brother     Dementia Brother     Heart disease Brother        Social History     Socioeconomic History    Marital status:    Tobacco Use    Smoking status: Never    Smokeless tobacco: Never   Substance and Sexual Activity    Alcohol use: No    Drug use: No    Sexual activity: Not Currently     Partners: Male     Social Determinants of Health     Financial Resource Strain: Low Risk  (3/13/2024)    Overall Financial Resource Strain (CARDIA)     Difficulty of Paying Living Expenses: Not hard at all   Food Insecurity: No Food Insecurity (3/13/2024)    Hunger Vital Sign     Worried About Running Out of Food in the Last Year: Never true     Ran Out of Food in the Last Year: Never true   Transportation Needs: No Transportation Needs (3/13/2024)    PRAPARE - Transportation     Lack of Transportation (Medical): No     Lack of Transportation (Non-Medical): No   Physical Activity: Inactive (3/13/2024)    Exercise Vital Sign     Days of  Exercise per Week: 0 days     Minutes of Exercise per Session: 0 min   Stress: Stress Concern Present (3/13/2024)    [employer] - Occupational Stress Questionnaire     Feeling of Stress : To some extent   Social Connections: Socially Isolated (3/13/2024)    Social Connection and Isolation Panel [NHANES]     Frequency of Communication with Friends and Family: More than three times a week     Frequency of Social Gatherings with Friends and Family: More than three times a week     Attends [employer] Services: Never     Active Member of Clubs or Organizations: No     Attends Club or Organization Meetings: Never     Marital Status:    Housing Stability: Low Risk  (3/13/2024)    Housing Stability Vital Sign     Unable to Pay for Housing in the Last Year: No     Number of Places Lived in the Last Year: 1     Unstable Housing in the Last Year: No       Current Outpatient Medications   Medication Sig Dispense Refill    amLODIPine (NORVASC) 10 MG tablet TAKE 1 TABLET BY MOUTH EVERY DAY 90 tablet 0    atorvastatin (LIPITOR) 80 MG tablet TAKE 1 TABLET (80 MG TOTAL) BY MOUTH ONCE DAILY. 90 tablet 3    baclofen (LIORESAL) 10 MG tablet TAKE 1 TABLET BY MOUTH EVERY DAY 90 tablet 2    clopidogreL (PLAVIX) 75 mg tablet Take 1 tablet (75 mg total) by mouth once daily. 90 tablet 1    esomeprazole (NEXIUM) 20 MG capsule TAKE 1 CAPSULE (20 MG TOTAL) BY MOUTH ONCE DAILY. PATIENT TAKES OTC NEXIUM 90 capsule 0    ferrous sulfate 324 mg (65 mg iron) TbEC TAKE 1 TABLET (324 MG TOTAL) BY MOUTH ONCE DAILY. 90 tablet 3    hydroCHLOROthiazide (HYDRODIURIL) 25 MG tablet TAKE 1 TABLET BY MOUTH EVERY DAY 90 tablet 0    ibandronate (BONIVA) 150 mg tablet TAKE 1 TABLET BY MOUTH EVERY 30 DAYS. 3 tablet 3    irbesartan (AVAPRO) 300 MG tablet TAKE 1 TABLET BY MOUTH EVERY EVENING. 90 tablet 0    levothyroxine (SYNTHROID) 50 MCG tablet Take 1 tablet (50 mcg total) by mouth before breakfast. 90 tablet 0    magnesium oxide  (MAG-OX) 400 mg (241.3 mg magnesium) tablet TAKE 1 TABLET (400 MG TOTAL) BY MOUTH ONCE DAILY. EVERY DAY 90 tablet 3    melatonin 3 mg Tab Take 3 mg by mouth nightly.      memantine (NAMENDA) 10 MG Tab TAKE 1 TABLET BY MOUTH TWICE A  tablet 3    metFORMIN (GLUCOPHAGE) 500 MG tablet Take 1 tablet (500 mg total) by mouth 2 (two) times daily with meals. 180 tablet 3    MULTIVITAMIN ORAL Take 1 Package by mouth once daily. Pt takes Diabetic Vitamin Pack      nitroGLYCERIN (NITROSTAT) 0.4 MG SL tablet Place 1 tablet (0.4 mg total) under the tongue every 5 (five) minutes as needed for Chest pain. As directed PRN (Patient taking differently: Place 0.4 mg under the tongue every 5 (five) minutes as needed for Chest pain. As directed for chest pain.  Seek medical help if pain is not relieved by the third dose.) 100 tablet 3    potassium chloride (MICRO-K) 10 MEQ CpSR TAKE 1 CAPSULE BY MOUTH ONCE DAILY. 90 capsule 0    rivastigmine tartrate (EXELON) 6 mg capsule TAKE 1 CAPSULE (6 MG TOTAL) BY MOUTH 2 (TWO) TIMES DAILY. 180 capsule 3    sertraline (ZOLOFT) 100 MG tablet TAKE 1 TABLET BY MOUTH EVERY DAY 90 tablet 3    turmeric root extract 500 mg Cap Take 1 capsule by mouth 3 (three) times daily.       vitamin D 1000 units Tab Take 1,000 Units by mouth once daily.        No current facility-administered medications for this visit.       Review of patient's allergies indicates:   Allergen Reactions    Amoxicillin-pot clavulanate      Other reaction(s): CONSTIPATION    Hydrocodone-acetaminophen Other (See Comments)     Other reaction(s): irritable  Other reaction(s): aggressive    Meperidine Other (See Comments)     Other reaction(s): irritability  Other reaction(s): aggressiveness    Propoxyphene n-acetaminophen      Other reaction(s): irritable  Other reaction(s): aggressive    Sulfa (sulfonamide antibiotics)      Other reaction(s): Rash       Physical examination  Vitals Reviewed  /62 (BP Location: Right arm,  "Patient Position: Sitting, BP Method: Medium (Manual))   Pulse 71   Resp 16   Ht 5' 4" (1.626 m)   Wt 96.8 kg (213 lb 6.5 oz)   SpO2 95%   BMI 36.63 kg/m²  Body mass index is 36.63 kg/m².     BP Readings from Last 3 Encounters:   03/13/24 110/62   08/02/23 (!) 138/58   12/07/22 130/60       Wt Readings from Last 3 Encounters:   03/13/24 96.8 kg (213 lb 6.5 oz)   08/09/23 108.4 kg (239 lb)   08/02/23 108.4 kg (239 lb)     Gen. Well-dressed well-nourished   Skin warm dry and intact.  No rashes noted.  Neck is supple without adenopathy  Chest.  Respirations are even unlabored.  Lungs are clear to auscultation.  Cardiac regular rate and rhythm.  No chest wall adenopathy noted.  Neuro. Awake alert oriented x4.  Normal judgment and cognition noted.  Extremities no clubbing cyanosis or edema noted.     Call or return to clinic prn if these symptoms worsen or fail to improve as anticipated.    "

## 2024-03-14 LAB
ANION GAP SERPL CALC-SCNC: 10 MMOL/L (ref 8–16)
BACTERIA #/AREA URNS AUTO: ABNORMAL /HPF
BILIRUB UR QL STRIP: NEGATIVE
BUN SERPL-MCNC: 18 MG/DL (ref 8–23)
CALCIUM SERPL-MCNC: 9.9 MG/DL (ref 8.7–10.5)
CHLORIDE SERPL-SCNC: 102 MMOL/L (ref 95–110)
CLARITY UR REFRACT.AUTO: ABNORMAL
CO2 SERPL-SCNC: 25 MMOL/L (ref 23–29)
COLOR UR AUTO: ABNORMAL
CREAT SERPL-MCNC: 1 MG/DL (ref 0.5–1.4)
EST. GFR  (NO RACE VARIABLE): 55.6 ML/MIN/1.73 M^2
GLUCOSE SERPL-MCNC: 77 MG/DL (ref 70–110)
GLUCOSE UR QL STRIP: NEGATIVE
HGB UR QL STRIP: NEGATIVE
HYALINE CASTS UR QL AUTO: 0 /LPF
KETONES UR QL STRIP: ABNORMAL
LEUKOCYTE ESTERASE UR QL STRIP: NEGATIVE
MICROSCOPIC COMMENT: ABNORMAL
NITRITE UR QL STRIP: POSITIVE
PH UR STRIP: 6 [PH] (ref 5–8)
POTASSIUM SERPL-SCNC: 4.1 MMOL/L (ref 3.5–5.1)
PROT UR QL STRIP: ABNORMAL
RBC #/AREA URNS AUTO: 32 /HPF (ref 0–4)
SODIUM SERPL-SCNC: 137 MMOL/L (ref 136–145)
SP GR UR STRIP: 1.02 (ref 1–1.03)
SQUAMOUS #/AREA URNS AUTO: 21 /HPF
URN SPEC COLLECT METH UR: ABNORMAL
WBC #/AREA URNS AUTO: 15 /HPF (ref 0–5)

## 2024-03-14 RX ORDER — NITROFURANTOIN 25; 75 MG/1; MG/1
100 CAPSULE ORAL 2 TIMES DAILY
Qty: 14 CAPSULE | Refills: 0 | Status: SHIPPED | OUTPATIENT
Start: 2024-03-14

## 2024-03-16 LAB — BACTERIA UR CULT: ABNORMAL

## 2024-11-10 DIAGNOSIS — M81.0 OSTEOPOROSIS, UNSPECIFIED OSTEOPOROSIS TYPE, UNSPECIFIED PATHOLOGICAL FRACTURE PRESENCE: ICD-10-CM

## 2024-11-10 NOTE — TELEPHONE ENCOUNTER
Care Due:                  Date            Visit Type   Department     Provider  --------------------------------------------------------------------------------                                EP -                              PRIMARY      [facility]  Last Visit: 12-      CARE ([facility])   MEDICINE       Candace Davis  Next Visit: None Scheduled  None         None Found                                                            Last  Test          Frequency    Reason                     Performed    Due Date  --------------------------------------------------------------------------------    Office Visit  12 months..  amLODIPine, atorvastatin,   12- 12-                             clopidogreL,                             esomeprazole,                             hydroCHLOROthiazide,                             ibandronate, irbesartan,                             levothyroxine, magnesium,                             metFORMIN, potassium,                             sertraline...............    HBA1C.......  6 months...  metFORMIN................  03- 09-    Mg Level....  12 months..  ibandronate, magnesium...  12- 12-    Phosphate...  12 months..  ibandronate..............  Not Found    Overdue    [employer] Embedded Care Due Messages. Reference number: [id number].   11/10/2024 7:09:36 AM CST

## 2024-11-11 RX ORDER — IBANDRONATE SODIUM 150 MG/1
150 TABLET, FILM COATED ORAL
Qty: 3 TABLET | Refills: 3 | Status: SHIPPED | OUTPATIENT
Start: 2024-11-11

## 2024-11-29 ENCOUNTER — TELEPHONE (OUTPATIENT)
Dept: NEUROLOGY | Facility: CLINIC | Age: 84
End: 2024-11-29
Payer: MEDICARE

## 2024-11-29 NOTE — TELEPHONE ENCOUNTER
Called Both PT and son to schedule PT for a virtual visit F/U for refill on medications. PT did not  left a message.

## 2024-12-02 ENCOUNTER — TELEPHONE (OUTPATIENT)
Dept: NEUROLOGY | Facility: CLINIC | Age: 84
End: 2024-12-02
Payer: MEDICARE

## 2024-12-02 NOTE — TELEPHONE ENCOUNTER
"----- Message from Ivette sent at 11/29/2024  3:10 PM CST -----  Regarding: pt advice  Contact: [phone]  .Name Of Caller: Matthew/ son      Contact Preference?: [phone]     What is the nature of the call?: Returning missed call to Ernesto, in reference to a virtual appt. Pls call      Additional Notes:  "Thank you for all that you do for our patients"  "

## 2024-12-02 NOTE — TELEPHONE ENCOUNTER
Called PT son about scheduling his mother for a virtual visit F/U for memory. Pt has been scheduled  on Jan 8th for a virtual visit.

## 2024-12-03 DIAGNOSIS — F03.90 MAJOR NEUROCOGNITIVE DISORDER: ICD-10-CM

## 2024-12-03 RX ORDER — RIVASTIGMINE TARTRATE 6 MG/1
6 CAPSULE ORAL 2 TIMES DAILY
Qty: 180 CAPSULE | Refills: 0 | Status: SHIPPED | OUTPATIENT
Start: 2024-12-03 | End: 2025-12-03

## 2025-01-07 ENCOUNTER — TELEPHONE (OUTPATIENT)
Facility: CLINIC | Age: 85
End: 2025-01-07
Payer: MEDICARE

## 2025-01-29 ENCOUNTER — OFFICE VISIT (OUTPATIENT)
Facility: CLINIC | Age: 85
End: 2025-01-29
Payer: MEDICARE

## 2025-01-29 DIAGNOSIS — E66.01 MORBID (SEVERE) OBESITY DUE TO EXCESS CALORIES: ICD-10-CM

## 2025-01-29 DIAGNOSIS — F03.90 DEMENTIA WITHOUT BEHAVIORAL DISTURBANCE: Primary | ICD-10-CM

## 2025-01-29 DIAGNOSIS — E11.21 CONTROLLED TYPE 2 DIABETES MELLITUS WITH DIABETIC NEPHROPATHY, WITHOUT LONG-TERM CURRENT USE OF INSULIN: ICD-10-CM

## 2025-01-29 DIAGNOSIS — N18.31 CHRONIC KIDNEY DISEASE, STAGE 3A: ICD-10-CM

## 2025-01-29 PROCEDURE — 1157F ADVNC CARE PLAN IN RCRD: CPT | Mod: CPTII,95,, | Performed by: NURSE PRACTITIONER

## 2025-01-29 PROCEDURE — 98007 SYNCH AUDIO-VIDEO EST HI 40: CPT | Mod: 95,,, | Performed by: NURSE PRACTITIONER

## 2025-01-29 PROCEDURE — 1160F RVW MEDS BY RX/DR IN RCRD: CPT | Mod: CPTII,95,, | Performed by: NURSE PRACTITIONER

## 2025-01-29 PROCEDURE — 1159F MED LIST DOCD IN RCRD: CPT | Mod: CPTII,95,, | Performed by: NURSE PRACTITIONER

## 2025-01-29 PROCEDURE — G2211 COMPLEX E/M VISIT ADD ON: HCPCS | Mod: 95,,, | Performed by: NURSE PRACTITIONER

## 2025-01-29 RX ORDER — RIVASTIGMINE TARTRATE 6 MG/1
6 CAPSULE ORAL 2 TIMES DAILY
Qty: 180 CAPSULE | Refills: 3 | Status: SHIPPED | OUTPATIENT
Start: 2025-01-29 | End: 2026-01-29

## 2025-01-29 RX ORDER — MEMANTINE HYDROCHLORIDE 5 MG/1
5 TABLET ORAL 2 TIMES DAILY
Qty: 180 TABLET | Refills: 3 | Status: SHIPPED | OUTPATIENT
Start: 2025-01-29 | End: 2026-01-29
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
## 2025-02-03 ENCOUNTER — TELEPHONE (OUTPATIENT)
Dept: FAMILY MEDICINE | Facility: CLINIC | Age: 85
End: 2025-02-03
Payer: MEDICARE

## 2025-02-03 DIAGNOSIS — N18.31 STAGE 3A CHRONIC KIDNEY DISEASE: ICD-10-CM

## 2025-02-03 DIAGNOSIS — U07.1 COVID-19: Primary | ICD-10-CM

## 2025-02-03 NOTE — TELEPHONE ENCOUNTER
Spoke with Matthew.   Patient tested positive for COVID this morning.   Complaining of fever, nasal congestion, very weak .   Requesting Paxlovid   She is on Atorvastatin and Plavix    Please advise.

## 2025-02-03 NOTE — TELEPHONE ENCOUNTER
----- Message from Sam sent at 2/3/2025  7:49 AM CST -----  Type: Needs Medical Advice    Who Called:  Pt's son   Pharmacy name and phone #:    [id number] - [place], [place] - [address]  [address] [address]  Phone: [phone] Fax: [phone]    Best Call Back Number: [phone]  Additional Information: Pt's son calling as he has covid and made contact with his mother as they live together. Pt took at home covid test and it was positive. Pt's son wants to know if dr could prescribe paxlovid or meds to help. Please call back to advise, Thanks!

## 2025-02-04 NOTE — TELEPHONE ENCOUNTER
Patient has not been seen by me since 2022 and in family practice for a year. No current labs with current kidney function.  I called in paxlovid since she is high risk and has been ill for less than 5 days.  I recommend a BMP lab and holding plavix and lipitor while taking paxlovid.

## 2025-02-04 NOTE — TELEPHONE ENCOUNTER
Spoke with Matthew   Recommendations given per Dr Davis.   Son called EMS   Instructed to call back if needed

## 2025-03-17 ENCOUNTER — HOSPITAL ENCOUNTER (EMERGENCY)
Facility: HOSPITAL | Age: 85
Discharge: HOME | End: 2025-03-17
Attending: EMERGENCY MEDICINE
Payer: MEDICARE

## 2025-03-17 VITALS
WEIGHT: 220 LBS | OXYGEN SATURATION: 96 % | HEART RATE: 79 BPM | TEMPERATURE: 98 F | RESPIRATION RATE: 16 BRPM | SYSTOLIC BLOOD PRESSURE: 145 MMHG | DIASTOLIC BLOOD PRESSURE: 63 MMHG | BODY MASS INDEX: 37.76 KG/M2

## 2025-03-17 DIAGNOSIS — N39.0 URINARY TRACT INFECTION WITHOUT HEMATURIA, SITE UNSPECIFIED: Primary | ICD-10-CM

## 2025-03-17 LAB
ALBUMIN SERPL BCP-MCNC: 3.5 G/DL (ref 3.5–5.2)
ALP SERPL-CCNC: 122 U/L (ref 40–150)
ALT SERPL W/O P-5'-P-CCNC: 87 U/L (ref 10–44)
ANION GAP SERPL CALC-SCNC: 11 MMOL/L (ref 8–16)
AST SERPL-CCNC: 47 U/L (ref 10–40)
BACTERIA #/AREA URNS HPF: ABNORMAL /HPF
BASOPHILS # BLD AUTO: 0.06 K/UL (ref 0–0.2)
BASOPHILS NFR BLD: 0.5 % (ref 0–1.9)
BILIRUB SERPL-MCNC: 0.5 MG/DL (ref 0.1–1)
BILIRUB UR QL STRIP: NEGATIVE
BUN SERPL-MCNC: 17 MG/DL (ref 8–23)
CALCIUM SERPL-MCNC: 10.1 MG/DL (ref 8.7–10.5)
CHLORIDE SERPL-SCNC: 102 MMOL/L (ref 95–110)
CLARITY UR: ABNORMAL
CO2 SERPL-SCNC: 22 MMOL/L (ref 23–29)
COLOR UR: YELLOW
CREAT SERPL-MCNC: 1 MG/DL (ref 0.5–1.4)
DIFFERENTIAL METHOD BLD: ABNORMAL
EOSINOPHIL # BLD AUTO: 0.3 K/UL (ref 0–0.5)
EOSINOPHIL NFR BLD: 2 % (ref 0–8)
ERYTHROCYTE [DISTWIDTH] IN BLOOD BY AUTOMATED COUNT: 14.4 % (ref 11.5–14.5)
EST. GFR  (NO RACE VARIABLE): 55 ML/MIN/1.73 M^2
GLUCOSE SERPL-MCNC: 106 MG/DL (ref 70–110)
GLUCOSE UR QL STRIP: NEGATIVE
HCT VFR BLD AUTO: 39.9 % (ref 37–48.5)
HCV AB SERPL QL IA: NEGATIVE
HGB BLD-MCNC: 12.6 G/DL (ref 12–16)
HGB UR QL STRIP: NEGATIVE
HIV 1+2 AB+HIV1 P24 AG SERPL QL IA: NEGATIVE
IMM GRANULOCYTES # BLD AUTO: 0.07 K/UL (ref 0–0.04)
IMM GRANULOCYTES NFR BLD AUTO: 0.5 % (ref 0–0.5)
KETONES UR QL STRIP: ABNORMAL
LEUKOCYTE ESTERASE UR QL STRIP: NEGATIVE
LYMPHOCYTES # BLD AUTO: 1.8 K/UL (ref 1–4.8)
LYMPHOCYTES NFR BLD: 13.5 % (ref 18–48)
MAGNESIUM SERPL-MCNC: 1.6 MG/DL (ref 1.6–2.6)
MCH RBC QN AUTO: 27.2 PG (ref 27–31)
MCHC RBC AUTO-ENTMCNC: 31.6 G/DL (ref 32–36)
MCV RBC AUTO: 86 FL (ref 82–98)
MICROSCOPIC COMMENT: ABNORMAL
MONOCYTES # BLD AUTO: 1 K/UL (ref 0.3–1)
MONOCYTES NFR BLD: 7.8 % (ref 4–15)
NEUTROPHILS # BLD AUTO: 10 K/UL (ref 1.8–7.7)
NEUTROPHILS NFR BLD: 75.7 % (ref 38–73)
NITRITE UR QL STRIP: POSITIVE
NRBC BLD-RTO: 0 /100 WBC
PH UR STRIP: 7 [PH] (ref 5–8)
PLATELET # BLD AUTO: 549 K/UL (ref 150–450)
PMV BLD AUTO: 9.6 FL (ref 9.2–12.9)
POTASSIUM SERPL-SCNC: 3.7 MMOL/L (ref 3.5–5.1)
PROT SERPL-MCNC: 8.4 G/DL (ref 6–8.4)
PROT UR QL STRIP: ABNORMAL
RBC # BLD AUTO: 4.64 M/UL (ref 4–5.4)
SODIUM SERPL-SCNC: 135 MMOL/L (ref 136–145)
SP GR UR STRIP: 1.02 (ref 1–1.03)
URN SPEC COLLECT METH UR: ABNORMAL
UROBILINOGEN UR STRIP-ACNC: ABNORMAL EU/DL
WBC # BLD AUTO: 13.23 K/UL (ref 3.9–12.7)

## 2025-03-17 PROCEDURE — 87389 HIV-1 AG W/HIV-1&-2 AB AG IA: CPT | Performed by: EMERGENCY MEDICINE

## 2025-03-17 PROCEDURE — 80053 COMPREHEN METABOLIC PANEL: CPT | Performed by: EMERGENCY MEDICINE

## 2025-03-17 PROCEDURE — 36415 COLL VENOUS BLD VENIPUNCTURE: CPT | Performed by: EMERGENCY MEDICINE

## 2025-03-17 PROCEDURE — 81000 URINALYSIS NONAUTO W/SCOPE: CPT | Performed by: EMERGENCY MEDICINE

## 2025-03-17 PROCEDURE — 99284 EMERGENCY DEPT VISIT MOD MDM: CPT

## 2025-03-17 PROCEDURE — 85025 COMPLETE CBC W/AUTO DIFF WBC: CPT | Performed by: EMERGENCY MEDICINE

## 2025-03-17 PROCEDURE — 83735 ASSAY OF MAGNESIUM: CPT | Performed by: EMERGENCY MEDICINE

## 2025-03-17 PROCEDURE — 86803 HEPATITIS C AB TEST: CPT | Performed by: EMERGENCY MEDICINE

## 2025-03-17 RX ORDER — CEFUROXIME AXETIL 250 MG/1
250 TABLET ORAL 2 TIMES DAILY
Qty: 14 TABLET | Refills: 0 | Status: SHIPPED | OUTPATIENT
Start: 2025-03-17 | End: 2025-03-24

## 2025-03-17 RX ORDER — MUPIROCIN 20 MG/G
OINTMENT TOPICAL 3 TIMES DAILY
Qty: 30 G | Refills: 0 | Status: SHIPPED | OUTPATIENT
Start: 2025-03-17

## 2025-03-17 NOTE — ED NOTES
Jerica Rios presents to the ED with c/o weakness. Patient is taken care of by her son who is at the bedside. He reports that she has been more weak over the past 3 days and has worsened today. Patient had a large soft BM upon arrival of EMS to patient's home. She has a stage I wound to gluteal fold      .   Mucous membranes are pink and moist. Skin is warm, dry and intact.  MIO VSS  Verified patient's name and date of birth.

## 2025-03-17 NOTE — ED NOTES
Upon discharge, patient is AAOx4, no cardiac or respiratory complications. Follow up care and  Medications have been reviewed with patient and has been instructed to return to the ER if needed. Patient verbalized understanding and ambulated to the lobby without difficulty. EARLE LIEBERMAN.  AVS has been signed by patient in the computer.

## 2025-03-17 NOTE — ED PROVIDER NOTES
Encounter Date: 3/17/2025       History     Chief Complaint   Patient presents with    Weakness     Pt BIB ems with a c/o continued generalized weakness worsening x3 days     85-year-old female with a past medical history diabetes mellitus, reflux disease, hypertension, hyperlipidemia, and dementia presents for evaluation generalized weakness.  EMS reports the patient has generalized weakness for the last 3 days.  There has been no associated falls, chest pain, abdominal pain, fever, nausea vomiting, or diarrhea.  Additionally, the the patient's son reports that the patient has increasing tailbone pain for the last few days.  He denies any trauma, change in mental status, or recent falls.  Her pain is worse with palpation of the region.  There are no alleviating factors.      Review of patient's allergies indicates:   Allergen Reactions    Amoxicillin-pot clavulanate      Other reaction(s): CONSTIPATION    Hydrocodone-acetaminophen Other (See Comments)     Other reaction(s): irritable  Other reaction(s): aggressive    Meperidine Other (See Comments)     Other reaction(s): irritability  Other reaction(s): aggressiveness    Propoxyphene n-acetaminophen      Other reaction(s): irritable  Other reaction(s): aggressive    Sulfa (sulfonamide antibiotics)      Other reaction(s): Rash     Past Medical History:   Diagnosis Date    Allergy     Arthritis     Carotid artery disease     Coronary artery disease     Diabetes mellitus     Diabetes mellitus, type 2     Fall at home     GERD (gastroesophageal reflux disease)     Headache(784.0)     Hyperlipidemia     Hypertension     Mental disorder     Obesity     Right rib fracture     Snoring     Toe fracture, right      Past Surgical History:   Procedure Laterality Date    CHOLECYSTECTOMY      CORONARY STENT PLACEMENT      x 5    CYST REMOVAL      sebaceous cyst from back    EYE SURGERY      OVARIAN CYST REMOVAL      SINUS SURGERY      TONSILLECTOMY       Family History   Problem  Relation Name Age of Onset    Heart disease Mother      Diabetes Mother      Cancer Mother          breast cancer    Heart disease Father      Heart disease Sister  at 65     Stroke Brother      Dementia Brother      Heart disease Brother       Social History[1]  Review of Systems   Constitutional:  Positive for fatigue. Negative for chills and fever.   HENT:  Negative for congestion.    Respiratory:  Negative for cough and shortness of breath.    Cardiovascular:  Negative for chest pain.   Gastrointestinal:  Negative for abdominal pain, nausea and vomiting.   Genitourinary:  Negative for dysuria.   Musculoskeletal:  Negative for gait problem.   Skin:  Negative for color change.   Neurological:  Negative for dizziness and numbness.   Psychiatric/Behavioral:  Negative for agitation.        Physical Exam     Initial Vitals [25 1103]   BP Pulse Resp Temp SpO2   (!) 148/83 63 18 98.4 °F (36.9 °C) 96 %      MAP       --         Physical Exam    Nursing note and vitals reviewed.  Constitutional: She appears well-developed and well-nourished.   HENT:   Head: Atraumatic.   Eyes: EOM are normal. Pupils are equal, round, and reactive to light.   Neck:   Normal range of motion.  Cardiovascular:  Normal rate and regular rhythm.           Pulmonary/Chest: Breath sounds normal.   Abdominal: Abdomen is soft. Bowel sounds are normal. She exhibits no distension. There is no abdominal tenderness. There is no rebound and no guarding.   Musculoskeletal:         General: Normal range of motion.      Right shoulder: Normal.      Left shoulder: Normal.      Cervical back: Normal range of motion.     Neurological: She is alert.   Mildly confused.  At baseline mental status.   Skin: Skin is warm and dry.   Psychiatric: She has a normal mood and affect.         ED Course   Procedures  Labs Reviewed   CBC W/ AUTO DIFFERENTIAL - Abnormal       Result Value    WBC 13.23 (*)     RBC 4.64      Hemoglobin 12.6      Hematocrit 39.9       MCV 86      MCH 27.2      MCHC 31.6 (*)     RDW 14.4      Platelets 549 (*)     MPV 9.6      Immature Granulocytes 0.5      Gran # (ANC) 10.0 (*)     Immature Grans (Abs) 0.07 (*)     Lymph # 1.8      Mono # 1.0      Eos # 0.3      Baso # 0.06      nRBC 0      Gran % 75.7 (*)     Lymph % 13.5 (*)     Mono % 7.8      Eosinophil % 2.0      Basophil % 0.5      Differential Method Automated     COMPREHENSIVE METABOLIC PANEL - Abnormal    Sodium 135 (*)     Potassium 3.7      Chloride 102      CO2 22 (*)     Glucose 106      BUN 17      Creatinine 1.0      Calcium 10.1      Total Protein 8.4      Albumin 3.5      Total Bilirubin 0.5      Alkaline Phosphatase 122      AST 47 (*)     ALT 87 (*)     eGFR 55 (*)     Anion Gap 11     URINALYSIS, REFLEX TO URINE CULTURE - Abnormal    Specimen UA Urine, Catheterized      Color, UA Yellow      Appearance, UA Hazy (*)     pH, UA 7.0      Specific Gravity, UA 1.020      Protein, UA Trace (*)     Glucose, UA Negative      Ketones, UA Trace (*)     Bilirubin (UA) Negative      Occult Blood UA Negative      Nitrite, UA Positive (*)     Urobilinogen, UA 2.0-3.0 (*)     Leukocytes, UA Negative      Narrative:     Specimen Source->Urine   URINALYSIS MICROSCOPIC - Abnormal    Bacteria Many (*)     Microscopic Comment SEE COMMENT      Narrative:     Specimen Source->Urine   HEPATITIS C ANTIBODY    Hepatitis C Ab Negative      Narrative:     Release to patient->Immediate   HIV 1 / 2 ANTIBODY    HIV 1/2 Ag/Ab Negative      Narrative:     Release to patient->Immediate   MAGNESIUM    Magnesium 1.6            Imaging Results    None          Medications - No data to display  Medical Decision Making  85-year-old female presents for generalized weakness.    Initial differential diagnosis included but not limited to UTI, dehydration, and acute kidney injury.    Amount and/or Complexity of Data Reviewed  Labs: ordered.    Risk  Prescription drug management.  Risk Details: The patient was  emergently evaluated in the emergency department, her evaluation was significant for an elderly female who is at her baseline mental status.  Her abdominal exam is noted to be benign.  The the patient's labs were significant for infection in her urine.  The patient was recently placed on Macrobid for a UTI just a few days ago.  I suspect the patient has a partially treated UTI.  Additionally the patient does have an area noninfected skin breakdown noted to the buttock region.  The patient is stable for discharge to home and does not require further care or workup at this time.  She will be discharged home with topical Bactroban to apply to the area of skin breakdown, and with p.o. Ceftin for her UTI.  This was discussed with the patient's son who is in agreement with the plan.  She is referred to primary care for follow-up.                                      Clinical Impression:  Final diagnoses:  [N39.0] Urinary tract infection without hematuria, site unspecified (Primary)          ED Disposition Condition    Discharge Stable          ED Prescriptions       Medication Sig Dispense Start Date End Date Auth. Provider    mupirocin (BACTROBAN) 2 % ointment Apply topically 3 (three) times daily. 30 g 3/17/2025 -- Nico Gleason MD    cefUROXime (CEFTIN) 250 MG tablet Take 1 tablet (250 mg total) by mouth 2 (two) times daily. for 7 days 14 tablet 3/17/2025 3/24/2025 Nico Gleason MD          Follow-up Information       Follow up With Specialties Details Why Contact Info    Candace Davis MD Family Medicine Schedule an appointment as soon as possible for a visit   [address] [place] [id number]  [phone]                   [1]   Social History  Tobacco Use    Smoking status: Never    Smokeless tobacco: Never   Substance Use Topics    Alcohol use: No    Drug use: No        Nico Gleason MD  03/17/25 [id number]

## 2025-03-18 ENCOUNTER — PATIENT OUTREACH (OUTPATIENT)
Facility: OTHER | Age: 85
End: 2025-03-18
Payer: MEDICARE

## 2025-03-18 NOTE — PROGRESS NOTES
Patient was seen in the ED on 3/17/25. Phoned patient to assist with Post ED Discharge Navigation. Spoke with patients Son/Caregiver, Matthew, who declined assistance scheduling a Post ED Discharge follow-up appointment at this time.  Jhony Arellano

## 2025-03-24 ENCOUNTER — TELEPHONE (OUTPATIENT)
Dept: FAMILY MEDICINE | Facility: CLINIC | Age: 85
End: 2025-03-24
Payer: MEDICARE

## 2025-03-24 DIAGNOSIS — Z00.00 ENCOUNTER FOR MEDICARE ANNUAL WELLNESS EXAM: ICD-10-CM

## 2025-03-24 NOTE — TELEPHONE ENCOUNTER
----- Message from Christa sent at 3/24/2025 10:36 AM CDT -----  Regarding: advice  Contact: Matthew son  Type: Needs Medical AdviceWho Called:  [facility] (please be specific):  bed sores buttocks, breast and stomachHow long has patient had these symptoms:  Pharmacy name and phone #:  [id number] - [place], [place] - [id number] [place] [address]Eetdp: [phone] Fax: [phone] Call Back Number: [phone] Information: Son states patient has bed sores and her tail bone hurts where she can barely  to stand.  Please call son to advise.  Thanks!

## 2025-03-24 NOTE — TELEPHONE ENCOUNTER
Spoke to Matthew pt's son who states pt's mobility is sunbar and her tailbone is hurting and now getting bed sores. He has tried donuts, rotating her with pillows/cushion and air alternating mattress etc. States he has now noticed skin breakage under her breast and tummy area. Son is req some direction for tailbone pain and wound care. He states she does not want to stand with the tail bone pain. He states she is getting harder to transport and transfer. Offered appt and scheduled. Also discussed HH, transfer boards, pt/ot, and transportation options. Son will discuss at upcoming appt and will also contact insurance regarding transportation

## 2025-03-26 ENCOUNTER — LAB VISIT (OUTPATIENT)
Dept: LAB | Facility: HOSPITAL | Age: 85
End: 2025-03-26
Payer: MEDICARE

## 2025-03-26 ENCOUNTER — OFFICE VISIT (OUTPATIENT)
Dept: FAMILY MEDICINE | Facility: CLINIC | Age: 85
End: 2025-03-26
Payer: MEDICARE

## 2025-03-26 VITALS
HEIGHT: 64 IN | BODY MASS INDEX: 35.17 KG/M2 | SYSTOLIC BLOOD PRESSURE: 132 MMHG | HEART RATE: 80 BPM | TEMPERATURE: 99 F | WEIGHT: 206 LBS | DIASTOLIC BLOOD PRESSURE: 60 MMHG | OXYGEN SATURATION: 98 %

## 2025-03-26 DIAGNOSIS — I15.2 HYPERTENSION ASSOCIATED WITH DIABETES: Primary | ICD-10-CM

## 2025-03-26 DIAGNOSIS — L30.4 INTERTRIGO: ICD-10-CM

## 2025-03-26 DIAGNOSIS — N18.31 CHRONIC KIDNEY DISEASE, STAGE 3A: ICD-10-CM

## 2025-03-26 DIAGNOSIS — E11.9 CONTROLLED TYPE 2 DIABETES MELLITUS WITHOUT COMPLICATION, WITHOUT LONG-TERM CURRENT USE OF INSULIN: ICD-10-CM

## 2025-03-26 DIAGNOSIS — E78.5 HYPERLIPIDEMIA WITH TARGET LDL LESS THAN 70: ICD-10-CM

## 2025-03-26 DIAGNOSIS — L89.301 PRESSURE INJURY OF BUTTOCK, STAGE 1, UNSPECIFIED LATERALITY: ICD-10-CM

## 2025-03-26 DIAGNOSIS — R07.9 CHEST PAIN AT REST: ICD-10-CM

## 2025-03-26 DIAGNOSIS — R74.01 TRANSAMINITIS: ICD-10-CM

## 2025-03-26 DIAGNOSIS — Z91.89 SEDENTARY LIFESTYLE: ICD-10-CM

## 2025-03-26 DIAGNOSIS — F03.90 DEMENTIA WITHOUT BEHAVIORAL DISTURBANCE: ICD-10-CM

## 2025-03-26 DIAGNOSIS — E11.59 HYPERTENSION ASSOCIATED WITH DIABETES: ICD-10-CM

## 2025-03-26 DIAGNOSIS — I15.2 HYPERTENSION ASSOCIATED WITH DIABETES: ICD-10-CM

## 2025-03-26 DIAGNOSIS — E11.59 HYPERTENSION ASSOCIATED WITH DIABETES: Primary | ICD-10-CM

## 2025-03-26 LAB
ABSOLUTE EOSINOPHIL (OHS): 0.33 K/UL
ABSOLUTE MONOCYTE (OHS): 1.03 K/UL (ref 0.3–1)
ABSOLUTE NEUTROPHIL COUNT (OHS): 9.5 K/UL (ref 1.8–7.7)
ALBUMIN SERPL BCP-MCNC: 3.7 G/DL (ref 3.5–5.2)
ALP SERPL-CCNC: 115 UNIT/L (ref 40–150)
ALT SERPL W/O P-5'-P-CCNC: 53 UNIT/L (ref 10–44)
ANION GAP (OHS): 14 MMOL/L (ref 8–16)
AST SERPL-CCNC: 38 UNIT/L (ref 11–45)
BASOPHILS # BLD AUTO: 0.08 K/UL
BASOPHILS NFR BLD AUTO: 0.6 %
BILIRUB SERPL-MCNC: 0.3 MG/DL (ref 0.1–1)
BUN SERPL-MCNC: 15 MG/DL (ref 8–23)
CALCIUM SERPL-MCNC: 9.6 MG/DL (ref 8.7–10.5)
CHLORIDE SERPL-SCNC: 100 MMOL/L (ref 95–110)
CHOLEST SERPL-MCNC: 97 MG/DL (ref 120–199)
CHOLEST/HDLC SERPL: 2.4 {RATIO} (ref 2–5)
CO2 SERPL-SCNC: 20 MMOL/L (ref 23–29)
CREAT SERPL-MCNC: 0.9 MG/DL (ref 0.5–1.4)
ERYTHROCYTE [DISTWIDTH] IN BLOOD BY AUTOMATED COUNT: 15 % (ref 11.5–14.5)
GFR SERPLBLD CREATININE-BSD FMLA CKD-EPI: >60 ML/MIN/1.73/M2
GLUCOSE SERPL-MCNC: 88 MG/DL (ref 70–110)
HCT VFR BLD AUTO: 37.3 % (ref 37–48.5)
HDLC SERPL-MCNC: 41 MG/DL (ref 40–75)
HDLC SERPL: 42.3 % (ref 20–50)
HGB BLD-MCNC: 12.2 GM/DL (ref 12–16)
IMM GRANULOCYTES # BLD AUTO: 0.05 K/UL (ref 0–0.04)
IMM GRANULOCYTES NFR BLD AUTO: 0.4 % (ref 0–0.5)
LDLC SERPL CALC-MCNC: 35.4 MG/DL (ref 63–159)
LYMPHOCYTES # BLD AUTO: 2.5 K/UL (ref 1–4.8)
MCH RBC QN AUTO: 28.3 PG (ref 27–50)
MCHC RBC AUTO-ENTMCNC: 32.7 G/DL (ref 32–36)
MCV RBC AUTO: 87 FL (ref 82–98)
NONHDLC SERPL-MCNC: 56 MG/DL
NUCLEATED RBC (/100WBC) (OHS): 0 /100 WBC
PLATELET # BLD AUTO: 502 K/UL (ref 150–450)
PMV BLD AUTO: 10.5 FL (ref 9.2–12.9)
POTASSIUM SERPL-SCNC: 3.8 MMOL/L (ref 3.5–5.1)
PROT SERPL-MCNC: 7.5 GM/DL (ref 6–8.4)
RBC # BLD AUTO: 4.31 M/UL (ref 4–5.4)
RELATIVE EOSINOPHIL (OHS): 2.4 %
RELATIVE LYMPHOCYTE (OHS): 18.5 % (ref 18–48)
RELATIVE MONOCYTE (OHS): 7.6 % (ref 4–15)
RELATIVE NEUTROPHIL (OHS): 70.5 % (ref 38–73)
SODIUM SERPL-SCNC: 134 MMOL/L (ref 136–145)
TRIGL SERPL-MCNC: 103 MG/DL (ref 30–150)
WBC # BLD AUTO: 13.49 K/UL (ref 3.9–12.7)

## 2025-03-26 PROCEDURE — 1157F ADVNC CARE PLAN IN RCRD: CPT | Mod: CPTII,S$GLB,,

## 2025-03-26 PROCEDURE — 36415 COLL VENOUS BLD VENIPUNCTURE: CPT | Mod: PO

## 2025-03-26 PROCEDURE — 3288F FALL RISK ASSESSMENT DOCD: CPT | Mod: CPTII,S$GLB,,

## 2025-03-26 PROCEDURE — 99215 OFFICE O/P EST HI 40 MIN: CPT | Mod: S$GLB,,,

## 2025-03-26 PROCEDURE — G2211 COMPLEX E/M VISIT ADD ON: HCPCS | Mod: S$GLB,,,

## 2025-03-26 PROCEDURE — 1160F RVW MEDS BY RX/DR IN RCRD: CPT | Mod: CPTII,S$GLB,,

## 2025-03-26 PROCEDURE — 1100F PTFALLS ASSESS-DOCD GE2>/YR: CPT | Mod: CPTII,S$GLB,,

## 2025-03-26 PROCEDURE — 3078F DIAST BP <80 MM HG: CPT | Mod: CPTII,S$GLB,,

## 2025-03-26 PROCEDURE — 99999 PR PBB SHADOW E&M-EST. PATIENT-LVL V: CPT | Mod: PBBFAC,,,

## 2025-03-26 PROCEDURE — 80061 LIPID PANEL: CPT

## 2025-03-26 PROCEDURE — 80053 COMPREHEN METABOLIC PANEL: CPT

## 2025-03-26 PROCEDURE — 1126F AMNT PAIN NOTED NONE PRSNT: CPT | Mod: CPTII,S$GLB,,

## 2025-03-26 PROCEDURE — 3075F SYST BP GE 130 - 139MM HG: CPT | Mod: CPTII,S$GLB,,

## 2025-03-26 PROCEDURE — 85025 COMPLETE CBC W/AUTO DIFF WBC: CPT

## 2025-03-26 PROCEDURE — 1159F MED LIST DOCD IN RCRD: CPT | Mod: CPTII,S$GLB,,

## 2025-03-26 RX ORDER — KETOCONAZOLE 20 MG/G
CREAM TOPICAL DAILY
Qty: 15 G | Refills: 0 | Status: SHIPPED | OUTPATIENT
Start: 2025-03-26

## 2025-03-26 RX ORDER — NITROGLYCERIN 0.4 MG/1
0.4 TABLET SUBLINGUAL EVERY 5 MIN PRN
Start: 2025-03-26

## 2025-03-26 NOTE — PROGRESS NOTES
[facility]     Subjective:       Patient ID:  [id number]     Chief Complaint: Follow-up and bed sores    Jerica Rios is a 85 y.o. female with a past medical history significant for diabetes mellitus, reflux disease, hypertension, hyperlipidemia, and dementia who presents to the clinic for ED follow up.     Patient presented to the ED on 3/17 with complaints of generalized weakness and tailbone pain. At this time, patient was found to have a UTI. Also, patient has minimal skin breakdown at buttock region, however not infected at this time. She was prescribed Bactroban for skin irritation and Ceftin for UTI.     The patient presents to the clinic with her son, who provides the majority of the history. He reports that since the recent ED visit, the patient has shown improvement but continues to experience persistent tailbone pain. The patient remains mostly sedentary, spending her time in a wheelchair, chair, or bed throughout the day. To help alleviate the pain, the son has provided a memory foam cushion and a donut cushion, which have offered some relief. However, tailbone pain remains a consistent issue.    The son has also been applying Bactroban to areas of skin breakdown, which has led to some improvement. Additionally, they noticed a rash under the abdomen and beneath both breasts on Sunday, and he has been applying Bactroban to these areas as well. The patient has not voiced any complaints related to this rash. The patient is quiet today however at baseline, but she denies experiencing chest pain, shortness of breath, or abdominal pain at this time.      Past Medical History:   Diagnosis Date    Allergy     Arthritis     Carotid artery disease     Coronary artery disease     Diabetes mellitus     Diabetes mellitus, type 2     Fall at home     GERD (gastroesophageal reflux disease)     Headache(784.0)     Hyperlipidemia     Hypertension     Mental disorder     Obesity     Right rib  fracture     Snoring     Toe fracture, right       Review of patient's allergies indicates:   Allergen Reactions    Amoxicillin-pot clavulanate      Other reaction(s): CONSTIPATION    Hydrocodone-acetaminophen Other (See Comments)     Other reaction(s): irritable  Other reaction(s): aggressive    Meperidine Other (See Comments)     Other reaction(s): irritability  Other reaction(s): aggressiveness    Propoxyphene n-acetaminophen      Other reaction(s): irritable  Other reaction(s): aggressive    Sulfa (sulfonamide antibiotics)      Other reaction(s): Rash       Lab Results   Component Value Date    WBC 13.23 (H) 03/17/2025    HGB 12.6 03/17/2025    HCT 39.9 03/17/2025     (H) 03/17/2025    CHOL 114 (L) 03/06/2024    TRIG 86 03/06/2024    HDL 41 03/06/2024    ALT 87 (H) 03/17/2025    AST 47 (H) 03/17/2025     (L) 03/17/2025    K 3.7 03/17/2025     03/17/2025    CREATININE 1.0 03/17/2025    BUN 17 03/17/2025    CO2 22 (L) 03/17/2025    TSH 1.696 03/06/2024    INR 1.0 10/16/2013    HGBA1C 5.6 03/06/2024       Review of Systems   Respiratory:  Negative for shortness of breath.    Cardiovascular:  Negative for chest pain.   Gastrointestinal:  Negative for abdominal pain.         Objective:      Physical Exam  Constitutional:       General: She is not in acute distress.     Appearance: She is not ill-appearing or toxic-appearing.      Comments: In wheel chair    HENT:      Head: Normocephalic and atraumatic.   Cardiovascular:      Rate and Rhythm: Normal rate and regular rhythm.      Pulses: Normal pulses.      Heart sounds: Normal heart sounds. No murmur heard.     No friction rub.   Pulmonary:      Effort: Pulmonary effort is normal. No respiratory distress.      Breath sounds: Normal breath sounds. No wheezing.   Abdominal:      General: Abdomen is flat. Bowel sounds are normal. There is no distension.      Tenderness: There is no abdominal tenderness. There is no guarding or rebound.   Skin:      General: Skin is warm.      Capillary Refill: Capillary refill takes less than 2 seconds.      Comments: Small skin break down at intergluteal cleft with no signs of infection. Erythematous rash appreciated under bilateral breast and panus.    Neurological:      General: No focal deficit present.      Mental Status: Mental status is at baseline.      Cranial Nerves: No cranial nerve deficit.      Comments: Patient quiet, however alert and responsive.    Psychiatric:         Mood and Affect: Mood normal.           Assessment:       1. Hypertension associated with diabetes    2. Chronic kidney disease, stage 3a    3. Controlled type 2 diabetes mellitus without complication, without long-term current use of insulin    4. Dementia without behavioral disturbance    5. Hyperlipidemia with target LDL less than 70, baseline LDL not available    6. Sedentary lifestyle    7. Transaminitis    8. Pressure injury of buttock, stage 1, unspecified laterality    9. Intertrigo          Plan:       Jerica was seen today for follow-up and bed sores.    Diagnoses and all orders for this visit:    Hypertension associated with diabetes  Comments:  Blood pressure within normal limits. Continue medication as prescribed.  Orders:  -     Comprehensive Metabolic Panel; Future  -     CBC Auto Differential; Future    Chronic kidney disease, stage 3a  Comments:  Stable. Will continue to monitor.    Controlled type 2 diabetes mellitus without complication, without long-term current use of insulin  Comments:  Will obtain HgA1C today.  Orders:  -     Hemoglobin A1C; Future    Dementia without behavioral disturbance  Comments:  Followed by neurology. Continue medication as prescribed.  Referred to home health. Discussed possible palliative care in the future if appropriate.   Orders:  -     Ambulatory referral/consult to Home Health; Future    Hyperlipidemia with target LDL less than 70, baseline LDL not available  -     Lipid Panel;  Future    Sedentary lifestyle  Comments:  Discussed rotating patient every 2 hours to prevent pressure ulcers    Transaminitis  Comments:  Labs consistent with elevated liver enzymes, likely secondary to infection. Will repeat today and assess for improvement.  Orders:  -     Comprehensive Metabolic Panel; Future    Pressure injury of buttock, stage 1, unspecified laterality  Comments:  Discussed importance of rotating patient. No signs of infection at this time. Will place referral to home health for possible wound care if needed.  Patient in diaper, discussed using Desitin cream for diaper irritation.     Intertrigo  Comments:  Rash under breast and pannus consistent with fungal infection. Will provide antifungal cream. Discussed using powder to decrease moisture.  Orders:  -     ketoconazole (NIZORAL) 2 % cream; Apply topically once daily.             Follow up for if symptoms are not improved.    Reina Bustos PA-C  Family Medicine Physician Assistant     Future Appointments       Date Specialty Appt Notes    3/26/2025 Lab nf             Tests to Keep You Healthy    Eye Exam: DUE  Last Blood Pressure <= 139/89 (3/26/2025): Yes       I spent a total of 40 minutes on the day of the visit.This includes face to face time and non-face to face time preparing to see the patient (eg, review of tests), obtaining and/or reviewing separately obtained history, documenting clinical information in the electronic or other health record, independently interpreting results and communicating results to the patient/family/caregiver, or care coordinator.

## 2025-03-27 ENCOUNTER — TELEPHONE (OUTPATIENT)
Dept: FAMILY MEDICINE | Facility: CLINIC | Age: 85
End: 2025-03-27
Payer: MEDICARE

## 2025-03-27 ENCOUNTER — RESULTS FOLLOW-UP (OUTPATIENT)
Dept: FAMILY MEDICINE | Facility: CLINIC | Age: 85
End: 2025-03-27

## 2025-03-27 NOTE — TELEPHONE ENCOUNTER
----- Message from Reina Bustos PA-C sent at 3/27/2025 10:01 AM CDT -----  I have reviewed Ms. Rios lab results.     Blood count is mostly within normal limits, though her white blood cell count is still elevated. Once home health is established, we can order additional labs to continue monitoring this. If the   white count remains elevated, further evaluations may be needed, including possibly repeating the urine sample to ensure the infection has cleared. However, her platelet count is improving, which is   a positive sign, indicating we're moving in the right direction. Platelets are sometimes elevated with infection/inflammation. Her sodium levels are slightly decreased but not acutely concerning at   this time. I recommend reducing free water intake slightly, and we can continue monitoring this, with repeat labs once home health is in place. Kidney function and liver enzymes have both improved,   which is also a positive sign. Your cholesterol looks great, and no further recommendations are necessary at this time.    Please keep me updated on Home Health and I will place repeat labs to ensure improvement of the labs that are not in goal. If you have any further questions, please let me know.     Reina Bustos PA-C  ----- Message -----  From: Lab, Background User  Sent: 3/26/2025   9:10 PM CDT  To: Reina Bustos PA-C

## 2025-03-30 NOTE — TELEPHONE ENCOUNTER
Care Due:                  Date            Visit Type   Department     Provider  --------------------------------------------------------------------------------    Last Visit: None Found      None         None Found  Next Visit: None Scheduled  None         None Found                                                            Last  Test          Frequency    Reason                     Performed    Due Date  --------------------------------------------------------------------------------    TSH.........  12 months..  levothyroxine............  03- 03-    [facility] Embedded Care Due Messages. Reference number: [id number].   3/30/2025 8:04:54 AM FRAN

## 2025-03-31 RX ORDER — ATORVASTATIN CALCIUM 80 MG/1
80 TABLET, FILM COATED ORAL DAILY
Qty: 90 TABLET | Refills: 3 | Status: SHIPPED | OUTPATIENT
Start: 2025-03-31

## 2025-04-06 DIAGNOSIS — F41.8 DEPRESSION WITH ANXIETY: ICD-10-CM

## 2025-04-06 NOTE — TELEPHONE ENCOUNTER
No care due was identified.  [facility] Embedded Care Due Messages. Reference number: [id number].   4/06/2025 6:56:39 AM CDT

## 2025-04-07 NOTE — TELEPHONE ENCOUNTER
----- Message from Saray sent at 4/7/2025 11:53 AM CDT -----  Type: Needs Medical AdviceWho Called:  [facility] Call Back Number: [phone] Information:requesting call back in regards to  needing an order for a social work consult please advise

## 2025-04-10 RX ORDER — SERTRALINE HYDROCHLORIDE 100 MG/1
100 TABLET, FILM COATED ORAL
Qty: 90 TABLET | Refills: 1 | Status: SHIPPED | OUTPATIENT
Start: 2025-04-10

## 2025-04-28 ENCOUNTER — PATIENT MESSAGE (OUTPATIENT)
Dept: FAMILY MEDICINE | Facility: CLINIC | Age: 85
End: 2025-04-28
Payer: MEDICARE

## 2025-04-29 RX ORDER — POTASSIUM CHLORIDE 750 MG/1
10 CAPSULE, EXTENDED RELEASE ORAL
Qty: 90 CAPSULE | Refills: 1 | Status: SHIPPED | OUTPATIENT
Start: 2025-04-29

## 2025-04-29 NOTE — TELEPHONE ENCOUNTER
Care Due:                  Date            Visit Type   Department     Provider  --------------------------------------------------------------------------------    Last Visit: None Found      None         None Found  Next Visit: None Scheduled  None         None Found                                                            Last  Test          Frequency    Reason                     Performed    Due Date  --------------------------------------------------------------------------------    Office Visit  12 months..  amLODIPine, clopidogreL,   Not Found    Overdue                             esomeprazole,                             hydroCHLOROthiazide,                             ibandronate, irbesartan,                             levothyroxine, magnesium,                             metFORMIN, potassium,                             sertraline...............    HBA1C.......  6 months...  metFORMIN................  03- 09-    Phosphate...  12 months..  ibandronate..............  Not Found    Overdue    [employer] Embedded Care Due Messages. Reference number: [id number].   4/29/2025 12:09:44 AM CDT

## 2025-04-30 ENCOUNTER — TELEPHONE (OUTPATIENT)
Dept: FAMILY MEDICINE | Facility: CLINIC | Age: 85
End: 2025-04-30
Payer: MEDICARE

## 2025-04-30 NOTE — TELEPHONE ENCOUNTER
----- Message from Madonna sent at 4/30/2025 11:22 AM CDT -----  Type: Needs Medical AdviceWho Called:  Lisa [facility] Call Back Number: [phone] LisaAdditional Information: requesting a call make about f/u on [facility] that was faxed on 4/18

## 2025-05-01 DIAGNOSIS — E11.9 TYPE 2 DIABETES MELLITUS WITHOUT COMPLICATION: ICD-10-CM

## 2025-05-01 NOTE — TELEPHONE ENCOUNTER
No care due was identified.  [facility] Embedded Care Due Messages. Reference number: [id number].   5/01/2025 11:58:29 AM CDT

## 2025-05-05 RX ORDER — METFORMIN HYDROCHLORIDE 500 MG/1
500 TABLET ORAL 2 TIMES DAILY WITH MEALS
Qty: 180 TABLET | Refills: 3 | Status: SHIPPED | OUTPATIENT
Start: 2025-05-05

## 2025-05-07 ENCOUNTER — TELEPHONE (OUTPATIENT)
Dept: FAMILY MEDICINE | Facility: CLINIC | Age: 85
End: 2025-05-07
Payer: MEDICARE

## 2025-05-07 NOTE — TELEPHONE ENCOUNTER
Spoke with Leah with [facility]  Asking for continuation of care 5 visit for PT.   Verbal order given with fax [phone] to sign order

## 2025-05-07 NOTE — TELEPHONE ENCOUNTER
----- Message from Madonna sent at 5/7/2025  8:48 AM CDT -----  Type: Needs Medical AdviceWho Called:  Leah Lovett Call Back Number: [phone] nurse LeahAdditional Information: nurse requesting a call back about the pt and physical therapy

## 2025-05-08 ENCOUNTER — TELEPHONE (OUTPATIENT)
Dept: FAMILY MEDICINE | Facility: CLINIC | Age: 85
End: 2025-05-08
Payer: MEDICARE

## 2025-05-08 NOTE — TELEPHONE ENCOUNTER
----- Message from Snaiya sent at 5/8/2025 11:51 AM CDT -----  Contact: Leah from [facility]  Type:  Needs Medical AdviceWho Called:   [facility] call back or a response [facility]?   Call Santos Call Back Number:    [phone] - LeahAdditional Information:   States she would like to speak with someone for the status of equipment request for hospital bed and new wheelchair - please call - thank you

## 2025-05-08 NOTE — TELEPHONE ENCOUNTER
Spoke to Leah with [facility] who states she was informed this am Pt has signed up with [facility]osp[facility] this am.     Spoke to Matthew pt son who states pt is going to [facility]osp[facility] as of yesterday. Pt will be with [facility] Nurse Liliana [phone].    Son states they have chosen hospice due to pt's ability depleting and the availability of resources and equipment. Pt has bed sores, limited mobility and wheel chair bound.     Hospice will handle all orders from here on out.

## 2025-06-13 ENCOUNTER — DOCUMENT SCAN (OUTPATIENT)
Dept: HOME HEALTH SERVICES | Facility: HOSPITAL | Age: 85
End: 2025-06-13
Payer: MEDICARE

## 2025-06-13 ENCOUNTER — EXTERNAL HOME HEALTH (OUTPATIENT)
Dept: HOME HEALTH SERVICES | Facility: HOSPITAL | Age: 85
End: 2025-06-13
Payer: MEDICARE

## 2025-06-23 ENCOUNTER — DOCUMENT SCAN (OUTPATIENT)
Dept: HOME HEALTH SERVICES | Facility: HOSPITAL | Age: 85
End: 2025-06-23
Payer: MEDICARE

## 2025-06-27 ENCOUNTER — DOCUMENT SCAN (OUTPATIENT)
Dept: HOME HEALTH SERVICES | Facility: HOSPITAL | Age: 85
End: 2025-06-27
Payer: MEDICARE

## 2025-06-29 NOTE — TELEPHONE ENCOUNTER
Care Due:                  Date            Visit Type   Department     Provider  --------------------------------------------------------------------------------    Last Visit: None Found      None         None Found  Next Visit: None Scheduled  None         None Found                                                            Last  Test          Frequency    Reason                     Performed    Due Date  --------------------------------------------------------------------------------    Office Visit  12 months..  amLODIPine, clopidogreL,   Not Found    Overdue                             esomeprazole,                             hydroCHLOROthiazide,                             ibandronate, irbesartan,                             levothyroxine, magnesium,                             metFORMIN, potassium,                             sertraline...............    HBA1C.......  6 months...  metFORMIN................  03- 09-    Phosphate...  12 months..  ibandronate..............  Not Found    Overdue    TSH.........  12 months..  levothyroxine............  03- 03-    [facility] Embedded Care Due Messages. Reference number: [id number].   6/29/2025 6:53:08 AM CDT

## 2025-07-01 RX ORDER — IRBESARTAN 300 MG/1
300 TABLET ORAL NIGHTLY
Qty: 90 TABLET | Refills: 3 | Status: SHIPPED | OUTPATIENT
Start: 2025-07-01

## 2025-07-01 RX ORDER — HYDROCHLOROTHIAZIDE 25 MG/1
25 TABLET ORAL
Qty: 90 TABLET | Refills: 3 | Status: SHIPPED | OUTPATIENT
Start: 2025-07-01

## 2025-07-05 DIAGNOSIS — I15.2 HYPERTENSION ASSOCIATED WITH DIABETES: ICD-10-CM

## 2025-07-05 DIAGNOSIS — E11.59 HYPERTENSION ASSOCIATED WITH DIABETES: ICD-10-CM

## 2025-07-05 NOTE — TELEPHONE ENCOUNTER
No care due was identified.  [facility] Embedded Care Due Messages. Reference number: [id number].   7/05/2025 6:55:07 AM CDT

## 2025-07-06 NOTE — TELEPHONE ENCOUNTER
No care due was identified.  [facility] Embedded Care Due Messages. Reference number: [id number].   7/06/2025 6:53:45 AM CDT

## 2025-07-07 RX ORDER — CLOPIDOGREL BISULFATE 75 MG/1
75 TABLET ORAL
Qty: 90 TABLET | Refills: 3 | Status: SHIPPED | OUTPATIENT
Start: 2025-07-07

## 2025-07-07 RX ORDER — AMLODIPINE BESYLATE 10 MG/1
10 TABLET ORAL
Qty: 90 TABLET | Refills: 3 | Status: SHIPPED | OUTPATIENT
Start: 2025-07-07

## 2025-07-14 ENCOUNTER — DOCUMENT SCAN (OUTPATIENT)
Dept: HOME HEALTH SERVICES | Facility: HOSPITAL | Age: 85
End: 2025-07-14
Payer: MEDICARE